# Patient Record
Sex: FEMALE | Race: BLACK OR AFRICAN AMERICAN | Employment: OTHER | URBAN - METROPOLITAN AREA
[De-identification: names, ages, dates, MRNs, and addresses within clinical notes are randomized per-mention and may not be internally consistent; named-entity substitution may affect disease eponyms.]

---

## 2017-01-20 ENCOUNTER — OFFICE VISIT (OUTPATIENT)
Dept: INTERNAL MEDICINE CLINIC | Facility: CLINIC | Age: 67
End: 2017-01-20

## 2017-01-20 VITALS
TEMPERATURE: 98 F | HEART RATE: 74 BPM | WEIGHT: 251 LBS | DIASTOLIC BLOOD PRESSURE: 65 MMHG | SYSTOLIC BLOOD PRESSURE: 101 MMHG | BODY MASS INDEX: 46.19 KG/M2 | HEIGHT: 62 IN

## 2017-01-20 DIAGNOSIS — M43.06 SPONDYLOLYSIS, LUMBAR REGION: ICD-10-CM

## 2017-01-20 DIAGNOSIS — I10 ESSENTIAL HYPERTENSION: ICD-10-CM

## 2017-01-20 DIAGNOSIS — E11.9 TYPE 2 DIABETES MELLITUS WITHOUT COMPLICATION, WITHOUT LONG-TERM CURRENT USE OF INSULIN (HCC): ICD-10-CM

## 2017-01-20 DIAGNOSIS — Z00.00 ENCOUNTER FOR ANNUAL HEALTH EXAMINATION: ICD-10-CM

## 2017-01-20 DIAGNOSIS — E78.5 DYSLIPIDEMIA: ICD-10-CM

## 2017-01-20 DIAGNOSIS — Z00.00 ENCOUNTER FOR MEDICARE ANNUAL WELLNESS EXAM: Primary | ICD-10-CM

## 2017-01-20 DIAGNOSIS — S32.009A CLOSED FRACTURE OF LUMBAR VERTEBRAL BODY (HCC): ICD-10-CM

## 2017-01-20 DIAGNOSIS — E66.01 OBESITY, CLASS III, BMI 40-49.9 (MORBID OBESITY) (HCC): ICD-10-CM

## 2017-01-20 DIAGNOSIS — Z12.11 SCREENING FOR COLON CANCER: ICD-10-CM

## 2017-01-20 PROCEDURE — 96160 PT-FOCUSED HLTH RISK ASSMT: CPT | Performed by: INTERNAL MEDICINE

## 2017-01-20 PROCEDURE — G0439 PPPS, SUBSEQ VISIT: HCPCS | Performed by: INTERNAL MEDICINE

## 2017-01-20 RX ORDER — ASPIRIN 81 MG/1
81 TABLET ORAL DAILY
Qty: 30 TABLET | Refills: 0 | Status: SHIPPED | OUTPATIENT
Start: 2017-01-20 | End: 2017-02-19

## 2017-01-20 RX ORDER — PRAVASTATIN SODIUM 20 MG
20 TABLET ORAL NIGHTLY
Qty: 90 TABLET | Refills: 3 | Status: SHIPPED | OUTPATIENT
Start: 2017-01-20 | End: 2018-01-15

## 2017-01-20 NOTE — ADDENDUM NOTE
Addended by: Allen Peer on: 1/20/2017 11:22 AM     Modules accepted: Orders, Level of Service, SmartSet

## 2017-01-20 NOTE — PROGRESS NOTES
HPI:   Migdalia Fernandez is a 73year old female who presents for a Medicare Subsequent Annual Wellness visit (Pt already had Initial Annual Wellness).     Her last annual assessment has been over 1 year: Annual Physical due on 02/20/1952         Patient Ca Coenzyme Q10 (COQ10) 200 MG Oral Cap Take 1 tablet by mouth daily. Turmeric Curcumin 500 MG Oral Cap Take 1 tablet by mouth daily. hydrocodone-acetaminophen 7.5-325 MG Oral Tab Take 1 tablet by mouth every 6 (six) hours as needed for Pain.       MEDIC Ear: Tympanic membrane and external ear normal. No drainage or tenderness. No mastoid tenderness. Tympanic membrane is not erythematous. Left Ear: Tympanic membrane and external ear normal. No drainage or tenderness.  No mastoid tenderness.    Nose: Nose adenopathy. Neurological: She is alert and oriented to person, place, and time. She has normal reflexes. No cranial nerve deficit. She exhibits normal muscle tone.  Coordination normal.    Reflex Scores:       Tricep reflexes are 2+ on the right side and hearing   loss: No             Visual Acuity                               SUGGESTED VACCINATIONS - Influenza, Pneumococcal, Zoster, Tetanus     There is no immunization history on file for this patient.     ASSESSMENT AND OTHER RELEVANT CHRONIC CONDITIONS you had any immunizations at another office such as Influenza, Hepatitis B, Tetanus, or Pneumococcal?: No     Functional Ability     Bathing or Showering: Able without help    Toileting: Able without help    Dressing: Able without help    Eating: Able with \"Tree\": Correct       This section provided for quick review of chart, separate sheet to patient  [address] Internal Lab or Procedure External Lab or Procedure   Diabetes Screening      HbgA1C   Annually GLYCOHEMOGLO or any previous visit. Hepatitis B No orders found for this or any previous visit. Tetanus No orders found for this or any previous visit.          [address] [facility] Internal Lab or Procedure External Lab or Procedure   Annual Monitoring o

## 2017-01-20 NOTE — PATIENT INSTRUCTIONS
Prevention Guidelines, Women Ages 72 and Older  Screening tests and vaccines are an important part of managing your health. Health counseling is essential, too. Below are guidelines for these, for women ages 72 and older.  Talk with your healthcare provid Lung cancer Adults age 54 to [de-identified] who have smoked Yearly screening in smokers with 30 pack-year history of smoking or who quit within 15 years   Obesity All women in this age group At routine exams   Osteoporosis All women in this age group Bone density test Counseling Who needs it How often   Diet and exercise Women who are overweight or obese When diagnosed, and then at routine exams   Fall prevention (exercise and vitamin D supplements) All women in this age group At routine exams   Sexually transmitted inf ---------- Medicare covers annually or at 6-month intervals for prediabetic patients        Cardiovascular Disease Screening     Cholesterol, covered every 5 yrs including Total, LDL and Trigs LDL CHOLESTEROL (mg/dL)   Date Value   12/23/2016 71     CHOLES Biennial benefit unless patient has history of long-term glucocorticoid use for medical condition    Last Dexa Scan:   XR DEXA BONE DENSITOMETRY (CPT=77080) 05/16/2016    No flowsheet data found.     Recommended for 2 year anniversary or as ordered in After Recommended Websites for Advanced Directives    [employer]ek[employer]no. org/publications/Documents/personal_dec. pdf  An information packet, including necessary form from the "[employer] website. http://www. [place]state. [place]/public/books/advin

## 2017-01-25 PROBLEM — E66.01 OBESITY, CLASS III, BMI 40-49.9 (MORBID OBESITY) (HCC): Status: ACTIVE | Noted: 2017-01-25

## 2017-01-25 PROBLEM — E78.5 DYSLIPIDEMIA: Status: ACTIVE | Noted: 2017-01-25

## 2017-01-25 PROBLEM — E66.813 OBESITY, CLASS III, BMI 40-49.9 (MORBID OBESITY): Status: ACTIVE | Noted: 2017-01-25

## 2017-01-29 PROBLEM — M25.561 PAIN IN BOTH KNEES: Status: ACTIVE | Noted: 2017-01-29

## 2017-01-29 PROBLEM — T23.052A: Status: ACTIVE | Noted: 2017-01-29

## 2017-01-29 PROBLEM — M25.562 PAIN IN BOTH KNEES: Status: ACTIVE | Noted: 2017-01-29

## 2017-02-23 PROBLEM — M17.0 PRIMARY OSTEOARTHRITIS OF KNEES, BILATERAL: Status: ACTIVE | Noted: 2017-02-23

## 2017-03-24 ENCOUNTER — TELEPHONE (OUTPATIENT)
Dept: INTERNAL MEDICINE CLINIC | Facility: CLINIC | Age: 67
End: 2017-03-24

## 2017-03-24 RX ORDER — CHLORTHALIDONE 25 MG/1
25 TABLET ORAL DAILY
Qty: 90 TABLET | Refills: 0 | Status: CANCELLED | OUTPATIENT
Start: 2017-03-24

## 2017-03-24 RX ORDER — CHLORTHALIDONE 25 MG/1
25 TABLET ORAL DAILY
Qty: 90 TABLET | Refills: 0 | Status: SHIPPED | OUTPATIENT
Start: 2017-03-24 | End: 2017-06-22

## 2017-03-24 NOTE — TELEPHONE ENCOUNTER
Current outpatient prescriptions:     •  chlorthalidone 25 MG Oral Tab, Take 1 tablet (25 mg total) by mouth daily. , Disp: 90 tablet, Rfl: 0 REFILL PATIENT WANTS 2 MTH REFILL

## 2017-03-24 NOTE — TELEPHONE ENCOUNTER
Reviewed patient chart, last office visit, and any current labs. Patient falls within the parameters of office protocol. Medication approved and sent to pharmacy, per office protocol.

## 2017-04-19 RX ORDER — PRAZOSIN HYDROCHLORIDE 1 MG/1
1 CAPSULE ORAL 2 TIMES DAILY
Qty: 90 CAPSULE | Refills: 0 | Status: SHIPPED | OUTPATIENT
Start: 2017-04-19 | End: 2017-04-21

## 2017-04-19 NOTE — TELEPHONE ENCOUNTER
Refill request     Current outpatient prescriptions:   •  Prazosin HCl 1 MG Oral Cap, Take 1 capsule (1 mg total) by mouth 2 (two) times daily. , Disp: 90 capsule, Rfl: 0

## 2017-04-21 ENCOUNTER — TELEPHONE (OUTPATIENT)
Dept: INTERNAL MEDICINE CLINIC | Facility: CLINIC | Age: 67
End: 2017-04-21

## 2017-04-21 RX ORDER — PRAZOSIN HYDROCHLORIDE 1 MG/1
1 CAPSULE ORAL 2 TIMES DAILY
Qty: 90 CAPSULE | Refills: 0 | Status: SHIPPED | OUTPATIENT
Start: 2017-04-21 | End: 2017-05-26

## 2017-04-21 NOTE — TELEPHONE ENCOUNTER
Called [place] and confirmed patient did not  prescription for Prazosin.   Re-ordered medication to [facility]/pharmacy, [place] per patient request.

## 2017-05-12 ENCOUNTER — TELEPHONE (OUTPATIENT)
Dept: INTERNAL MEDICINE CLINIC | Facility: CLINIC | Age: 67
End: 2017-05-12

## 2017-05-12 DIAGNOSIS — E11.9 TYPE 2 DIABETES MELLITUS WITHOUT COMPLICATION, WITHOUT LONG-TERM CURRENT USE OF INSULIN (HCC): Primary | ICD-10-CM

## 2017-05-26 ENCOUNTER — TELEPHONE (OUTPATIENT)
Dept: INTERNAL MEDICINE CLINIC | Facility: CLINIC | Age: 67
End: 2017-05-26

## 2017-05-26 NOTE — TELEPHONE ENCOUNTER
Current outpatient prescriptions:   •  Prazosin HCl 1 MG Oral Cap, Take 1 capsule (1 mg total) by mouth 2 (two) times daily. , Disp: 90 capsule, Rfl: 0  •

## 2017-05-30 RX ORDER — PRAZOSIN HYDROCHLORIDE 1 MG/1
1 CAPSULE ORAL 2 TIMES DAILY
Qty: 90 CAPSULE | Refills: 0 | Status: SHIPPED | OUTPATIENT
Start: 2017-05-30 | End: 2019-11-12

## 2017-05-31 ENCOUNTER — HOSPITAL ENCOUNTER (OUTPATIENT)
Dept: MAMMOGRAPHY | Age: 67
Discharge: HOME | End: 2017-05-31
Attending: OBSTETRICS & GYNECOLOGY
Payer: MEDICARE

## 2017-05-31 DIAGNOSIS — Z12.31 VISIT FOR SCREENING MAMMOGRAM: ICD-10-CM

## 2017-05-31 PROCEDURE — 77067 SCR MAMMO BI INCL CAD: CPT | Performed by: OBSTETRICS & GYNECOLOGY

## 2017-06-22 ENCOUNTER — OFFICE VISIT (OUTPATIENT)
Dept: INTERNAL MEDICINE CLINIC | Facility: CLINIC | Age: 67
End: 2017-06-22

## 2017-06-22 VITALS
HEIGHT: 62 IN | SYSTOLIC BLOOD PRESSURE: 98 MMHG | HEART RATE: 85 BPM | DIASTOLIC BLOOD PRESSURE: 66 MMHG | TEMPERATURE: 99 F | WEIGHT: 250.63 LBS | BODY MASS INDEX: 46.12 KG/M2

## 2017-06-22 DIAGNOSIS — E11.9 TYPE 2 DIABETES MELLITUS WITHOUT COMPLICATION, WITHOUT LONG-TERM CURRENT USE OF INSULIN (HCC): Primary | ICD-10-CM

## 2017-06-22 DIAGNOSIS — R42 LIGHTHEADEDNESS: ICD-10-CM

## 2017-06-22 PROCEDURE — 99214 OFFICE O/P EST MOD 30 MIN: CPT | Performed by: INTERNAL MEDICINE

## 2017-06-22 PROCEDURE — 82962 GLUCOSE BLOOD TEST: CPT | Performed by: INTERNAL MEDICINE

## 2017-06-22 PROCEDURE — 36416 COLLJ CAPILLARY BLOOD SPEC: CPT | Performed by: INTERNAL MEDICINE

## 2017-06-22 RX ORDER — CHLORTHALIDONE 25 MG/1
25 TABLET ORAL DAILY
Qty: 90 TABLET | Refills: 1 | Status: SHIPPED | OUTPATIENT
Start: 2017-06-22 | End: 2018-10-16

## 2017-06-22 RX ORDER — DILTIAZEM HYDROCHLORIDE 240 MG/1
240 CAPSULE, EXTENDED RELEASE ORAL DAILY
Qty: 90 CAPSULE | Refills: 0 | Status: SHIPPED | OUTPATIENT
Start: 2017-06-22 | End: 2017-09-17

## 2017-06-22 NOTE — PROGRESS NOTES
HPI:    Patient ID: Davion Kelley is a 78year old female.     HPI   She is here today complaining of lightheadedeness  She states that few days she was feeling lightheaded ,asociated with mild frontal headache but she denies any other symptoms , no blurr Outpatient Prescriptions:  chlorthalidone 25 MG Oral Tab Take 1 tablet (25 mg total) by mouth daily. Disp: 90 tablet Rfl: 1   DilTIAZem HCl  MG Oral Capsule SR 24 Hr Take 1 capsule (240 mg total) by mouth daily.  Disp: 90 capsule Rfl: 0   MELOXICAM 15 Social History:   Smoking Status: Former Smoker                   Packs/Day: 0.00  Years: 40      Alcohol Use: No                 PHYSICAL EXAM:    Physical Exam   Constitutional: She is oriented to person, place, and time.  She appears well-developed and exhibits no edema. Lymphadenopathy:     She has no cervical adenopathy. She has no axillary adenopathy. Neurological: She is alert and oriented to person, place, and time. She has normal reflexes. No cranial nerve deficit.  She exhibits normal muscl

## 2017-06-22 NOTE — PATIENT INSTRUCTIONS
Lightheadedness - could be due to blood pressure noted on lower side , will decrease quinipril to 20 mg , hold on chlorthalidone , advised to check blood pressure at home and call with blood pressure reading in 3-4 days , if your symptoms get worse , you f

## 2017-06-23 ENCOUNTER — TELEPHONE (OUTPATIENT)
Dept: INTERNAL MEDICINE CLINIC | Facility: CLINIC | Age: 67
End: 2017-06-23

## 2017-06-23 RX ORDER — QUINAPRIL 20 MG/1
20 TABLET ORAL NIGHTLY
Qty: 90 TABLET | Refills: 1 | Status: SHIPPED | OUTPATIENT
Start: 2017-06-23 | End: 2017-12-15

## 2017-06-23 NOTE — TELEPHONE ENCOUNTER
Per patient BP readings was taken before taking any BP medications. PCP aware, per PCP need to discontinue prazosin, continue taking diltiazem in am and quinapril in pm. To continue taking BP reading to monitor.  Spoke with patient, per patient she takes qi

## 2017-06-23 NOTE — TELEPHONE ENCOUNTER
Her blood pressurre is ok, she needed refill on her diltiaze/ and we send , also we told her to cut in half her quinipril iinsted of 40 to take 20 , and are this bp reading before taking meds?

## 2017-06-27 ENCOUNTER — TELEPHONE (OUTPATIENT)
Dept: INTERNAL MEDICINE CLINIC | Facility: CLINIC | Age: 67
End: 2017-06-27

## 2017-06-27 NOTE — TELEPHONE ENCOUNTER
Patient was told to call office with blood pressure readings,    6-23   9:10 pm  128/77 before medication  11:00 pm 125/72 after med    6-24  9:15 am 109/77 before med  10:45 am 128/69 after med  11:30 pm 154/69    6-25  9:00 am  121/60 before med  1:30 pm

## 2017-06-27 NOTE — TELEPHONE ENCOUNTER
Patient informed of Dr. Prabha Cardona instructions verbalized understanding. Denied any questions at the time of call.

## 2017-07-12 ENCOUNTER — TELEPHONE (OUTPATIENT)
Dept: INTERNAL MEDICINE CLINIC | Facility: CLINIC | Age: 67
End: 2017-07-12

## 2017-07-12 NOTE — TELEPHONE ENCOUNTER
Patient informed of MD instructions, verbalized understanding denied any questions at the time of call.

## 2017-07-12 NOTE — TELEPHONE ENCOUNTER
Current Outpatient Prescriptions:   •  Quinapril HCl 20 MG Oral Tab, Take 1 tablet (20 mg total) by mouth nightly., Disp: 90 tablet, Rfl: 1  •  ONETOUCH ULTRA BLUE In Vitro Strip, TEST 1 TIME DAILY, Disp: , Rfl: 0  •  chlorthalidone 25 MG Oral Tab, Take

## 2017-07-26 ENCOUNTER — TELEPHONE (OUTPATIENT)
Dept: INTERNAL MEDICINE CLINIC | Facility: CLINIC | Age: 67
End: 2017-07-26

## 2017-07-26 NOTE — TELEPHONE ENCOUNTER
7/12 3:30pm 133/66    7/14 10:30am 129/71    7/17 11:30am 130/68    7/19 10:45pm 135/70    7/21 9:00pm 139/80    7/22 10:30pm 141/66    7/23 10:00am 121/68   9:45 124/73    7/26 9:30am 138/75

## 2017-07-26 NOTE — TELEPHONE ENCOUNTER
Pt informed b/p readings are ok, pt informed to continue same meds and to continue checking b/p's and bring to next appt.

## 2017-07-31 PROBLEM — M65.331 TRIGGER MIDDLE FINGER OF RIGHT HAND: Status: ACTIVE | Noted: 2017-07-31

## 2017-07-31 PROBLEM — M19.041 OSTEOARTHRITIS OF FINGERS OF HANDS, BILATERAL: Status: ACTIVE | Noted: 2017-07-31

## 2017-07-31 PROBLEM — M19.042 OSTEOARTHRITIS OF FINGERS OF HANDS, BILATERAL: Status: ACTIVE | Noted: 2017-07-31

## 2017-09-18 RX ORDER — DILTIAZEM HYDROCHLORIDE 240 MG/1
240 CAPSULE, COATED, EXTENDED RELEASE ORAL DAILY
Qty: 90 CAPSULE | Refills: 0 | Status: SHIPPED | OUTPATIENT
Start: 2017-09-18 | End: 2019-11-12

## 2017-12-15 RX ORDER — QUINAPRIL 20 MG/1
20 TABLET ORAL NIGHTLY
Qty: 90 TABLET | Refills: 0 | Status: SHIPPED | OUTPATIENT
Start: 2017-12-15 | End: 2018-03-15

## 2017-12-15 NOTE — TELEPHONE ENCOUNTER
Hypertensive Medications  Protocol Criteria:  · Appointment scheduled in the past 6 months or in the next 3 months  · BMP or CMP in the past 12 months  · Creatinine result < 2  Recent Outpatient Visits            Yesterday Trigger middle finger of right ha

## 2017-12-28 ENCOUNTER — OFFICE VISIT (OUTPATIENT)
Dept: INTERNAL MEDICINE CLINIC | Facility: CLINIC | Age: 67
End: 2017-12-28

## 2017-12-28 VITALS
SYSTOLIC BLOOD PRESSURE: 134 MMHG | BODY MASS INDEX: 48.03 KG/M2 | TEMPERATURE: 99 F | HEIGHT: 62 IN | HEART RATE: 78 BPM | RESPIRATION RATE: 18 BRPM | DIASTOLIC BLOOD PRESSURE: 76 MMHG | WEIGHT: 261 LBS

## 2017-12-28 DIAGNOSIS — Z12.11 SCREENING FOR COLON CANCER: ICD-10-CM

## 2017-12-28 DIAGNOSIS — I10 ESSENTIAL HYPERTENSION: ICD-10-CM

## 2017-12-28 DIAGNOSIS — Z00.00 ROUTINE MEDICAL EXAM: Primary | ICD-10-CM

## 2017-12-28 PROCEDURE — G0463 HOSPITAL OUTPT CLINIC VISIT: HCPCS | Performed by: INTERNAL MEDICINE

## 2017-12-28 PROCEDURE — 99214 OFFICE O/P EST MOD 30 MIN: CPT | Performed by: INTERNAL MEDICINE

## 2017-12-28 RX ORDER — DILTIAZEM HYDROCHLORIDE 240 MG/1
CAPSULE, COATED, EXTENDED RELEASE ORAL
Qty: 90 CAPSULE | Refills: 0 | Status: SHIPPED | OUTPATIENT
Start: 2017-12-28 | End: 2018-03-24

## 2017-12-28 NOTE — PATIENT INSTRUCTIONS
Screening for colon cancer-referral for colonoscopy  Essential hypertension-advised for low-salt diet and aerobic exercise 4 times a week for 45 minutes check blood pressure at home and bring logbook next visit continue with current medication  Diabetes ty

## 2017-12-28 NOTE — PROGRESS NOTES
HPI:    Patient ID: Hoda Reddy is a 78year old female. HPI She came in today for follow-up on her blood pressure and refill her medication. She states that she checks her blood pressure at home and usually is around 130/80.   Taking her medication disturbance. The patient is not nervous/anxious. Current Outpatient Prescriptions:  DilTIAZem HCl ER Coated Beads 240 MG Oral Capsule SR 24 Hr TAKE 1 CAPSULE (240 MG TOTAL) BY MOUTH DAILY.  Disp: 90 capsule Rfl: 0   QUINAPRIL HCL 20 MG Oral Tab T leaking from brain   2009: HYSTERECTOMY  2010 and 2012: SHOULDER ARTHROSCOPY      Comment: RCR   Family History   Problem Relation Age of Onset   • Other [OTHER] Mother    • Cancer Self 63     uterine   • Cancer Brother 72     pancreatic      Social Histor has no wheezes. She has no rales. She exhibits no tenderness. Abdominal: Soft. Bowel sounds are normal. She exhibits no shifting dullness, no distension and no mass. There is no hepatosplenomegaly. There is no tenderness.  There is no rigidity, no rebound

## 2018-01-15 ENCOUNTER — TELEPHONE (OUTPATIENT)
Dept: INTERNAL MEDICINE CLINIC | Facility: CLINIC | Age: 68
End: 2018-01-15

## 2018-01-21 RX ORDER — PRAVASTATIN SODIUM 20 MG
TABLET ORAL
Qty: 90 TABLET | Refills: 0 | Status: SHIPPED | OUTPATIENT
Start: 2018-01-21 | End: 2018-06-26

## 2018-03-15 RX ORDER — QUINAPRIL 20 MG/1
20 TABLET ORAL NIGHTLY
Qty: 90 TABLET | Refills: 0 | Status: SHIPPED | OUTPATIENT
Start: 2018-03-15 | End: 2018-06-11

## 2018-03-25 RX ORDER — DILTIAZEM HYDROCHLORIDE 240 MG/1
CAPSULE, COATED, EXTENDED RELEASE ORAL
Qty: 90 CAPSULE | Refills: 0 | Status: SHIPPED | OUTPATIENT
Start: 2018-03-25 | End: 2018-07-02

## 2018-03-25 NOTE — TELEPHONE ENCOUNTER
Refilled per Epic protocol.     Hypertensive Medications  Protocol Criteria:  · Appointment scheduled in the past 6 months or in the next 3 months  · BMP or CMP in the past 12 months  · Creatinine result < 2  Recent Outpatient Visits            2 months ago

## 2018-04-05 ENCOUNTER — OFFICE VISIT (OUTPATIENT)
Dept: INTERNAL MEDICINE CLINIC | Facility: CLINIC | Age: 68
End: 2018-04-05

## 2018-04-05 VITALS
HEART RATE: 75 BPM | SYSTOLIC BLOOD PRESSURE: 135 MMHG | HEIGHT: 62 IN | TEMPERATURE: 99 F | WEIGHT: 265 LBS | DIASTOLIC BLOOD PRESSURE: 80 MMHG | BODY MASS INDEX: 48.76 KG/M2 | RESPIRATION RATE: 18 BRPM

## 2018-04-05 DIAGNOSIS — Z00.00 ENCOUNTER FOR ANNUAL HEALTH EXAMINATION: ICD-10-CM

## 2018-04-05 DIAGNOSIS — M17.0 PRIMARY OSTEOARTHRITIS OF KNEES, BILATERAL: ICD-10-CM

## 2018-04-05 DIAGNOSIS — E11.8 TYPE 2 DIABETES MELLITUS WITH COMPLICATION, WITHOUT LONG-TERM CURRENT USE OF INSULIN (HCC): ICD-10-CM

## 2018-04-05 DIAGNOSIS — E78.5 DYSLIPIDEMIA: ICD-10-CM

## 2018-04-05 DIAGNOSIS — Z78.0 MENOPAUSE: ICD-10-CM

## 2018-04-05 DIAGNOSIS — Z23 NEED FOR VACCINATION: ICD-10-CM

## 2018-04-05 DIAGNOSIS — E66.01 OBESITY, CLASS III, BMI 40-49.9 (MORBID OBESITY) (HCC): ICD-10-CM

## 2018-04-05 DIAGNOSIS — Z12.39 SCREENING FOR BREAST CANCER: ICD-10-CM

## 2018-04-05 DIAGNOSIS — M43.06 SPONDYLOLYSIS, LUMBAR REGION: ICD-10-CM

## 2018-04-05 DIAGNOSIS — Z00.00 MEDICARE ANNUAL WELLNESS VISIT, SUBSEQUENT: Primary | ICD-10-CM

## 2018-04-05 DIAGNOSIS — D72.819 LEUKOPENIA, UNSPECIFIED TYPE: ICD-10-CM

## 2018-04-05 DIAGNOSIS — M65.331 TRIGGER MIDDLE FINGER OF RIGHT HAND: ICD-10-CM

## 2018-04-05 DIAGNOSIS — I10 ESSENTIAL HYPERTENSION: ICD-10-CM

## 2018-04-05 PROBLEM — Z85.42 HISTORY OF ENDOMETRIAL CANCER: Status: ACTIVE | Noted: 2018-04-05

## 2018-04-05 PROBLEM — Z12.11 SCREENING FOR COLON CANCER: Status: RESOLVED | Noted: 2017-12-28 | Resolved: 2018-04-05

## 2018-04-05 PROBLEM — R42 LIGHTHEADEDNESS: Status: RESOLVED | Noted: 2017-06-22 | Resolved: 2018-04-05

## 2018-04-05 PROBLEM — T23.052A: Status: RESOLVED | Noted: 2017-01-29 | Resolved: 2018-04-05

## 2018-04-05 PROCEDURE — G0439 PPPS, SUBSEQ VISIT: HCPCS | Performed by: INTERNAL MEDICINE

## 2018-04-05 PROCEDURE — 96160 PT-FOCUSED HLTH RISK ASSMT: CPT | Performed by: INTERNAL MEDICINE

## 2018-04-05 PROCEDURE — 99397 PER PM REEVAL EST PAT 65+ YR: CPT | Performed by: INTERNAL MEDICINE

## 2018-04-05 NOTE — PROGRESS NOTES
HPI:   Leah Hollis is a 80year old female who presents for a Medicare Subsequent Annual Wellness visit (Pt already had Initial Annual Wellness).     Her last annual assessment has been over 1 year: Annual Physical due on 01/20/2018         Fall/Risk As (hypertension)     DM type 2 (diabetes mellitus, type 2) (HCC)     Obesity, Class III, BMI 40-49.9 (morbid obesity) ([facility] [address]     Dyslipidemia     Pain in both knees     Primary osteoarthritis of knees, bilateral     Trigger middle finger of right hand     Aretta Medicine Cap Take 1 tablet by mouth daily. MEDICAL INFORMATION:   She  has a past medical history of Arthritis; Cancer [facility]) (2009); Cancer determined [facility] ([facility]); Diabetes type 2, controlled ([facility] and Hypertension.     She  has a pas same time:  No   I have trouble understanding things on the TV:  No I have to strain to understand conversations:  No   I have to worry about missing the telephone ring or doorbell:  No I have trouble hearing conversations in a noisy background such as a c 2+ on the left side. Pulmonary/Chest: Effort normal and breath sounds normal. No respiratory distress. She has no wheezes. She has no rales. She exhibits no tenderness. Abdominal: Soft. Bowel sounds are normal. She exhibits no distension and no mass. Screening for breast cancer  -     [facility] SCREENING BILAT (CPT=77067); Future    Medicare annual wellness visit, subsequent  -     CBC WITH DIFFERENTIAL WITH PLATELET;  Future  -     VITAMIN B12; Future  -     VITAMIN D, 25-HYDROXY; Future  -     HEMOGLOBI Electrocardiogram date     Colorectal Cancer Screening      Colonoscopy Screen every 10 years Colonoscopy,10 Years due on 02/20/2001 Has referal     Flex Sigmoidoscopy Screen every 10 years No results found for this or any previous visit.  No flowsheet data Zoster   Not covered by Medicare Part B Information given This may be covered with your pharmacy  prescription benefits      [address] [facility] Internal Lab or Procedure External Lab or Procedure      Annual Monitoring of Persistent     Medication

## 2018-04-05 NOTE — PATIENT INSTRUCTIONS
Prevention Guidelines, Women Ages 72 and Older  Screening tests and vaccines are an important part of managing your health. Health counseling is essential, too. Below are guidelines for these, for women ages 72 and older.  Talk with your healthcare provid Lung cancer Adults age 54 to [de-identified] who have smoked Yearly screening in smokers with 30 pack-year history of smoking or who quit within 15 years   Obesity All women in this age group At routine exams   Osteoporosis All women in this age group Bone density test Counseling Who needs it How often   Diet and exercise Women who are overweight or obese When diagnosed, and then at routine exams   Fall prevention (exercise and vitamin D supplements) All women in this age group At routine exams   Sexually transmitted inf ---------- Medicare covers annually or at 6-month intervals for prediabetic patients        Cardiovascular Disease Screening     Cholesterol, covered every 5 yrs including Total, LDL and Trigs LDL Cholesterol (mg/dL)   Date Value   12/28/2017 77     Choles Biennial benefit unless patient has history of long-term glucocorticoid use for medical condition    Last Dexa Scan:   XR DEXA BONE DENSITOMETRY (CPT=77080) 05/16/2016    No flowsheet data found.     Recommended for 2 year anniversary or as ordered in After SeekAlumni.no. org/publications/Documents/personal_dec. pdf  An information packet, including necessary form from the [address] website. http://www. [place]. [place]/public/books/advin.htm  A link to the Sam Sahu

## 2018-06-11 RX ORDER — QUINAPRIL 20 MG/1
20 TABLET ORAL NIGHTLY
Qty: 90 TABLET | Refills: 0 | Status: SHIPPED | OUTPATIENT
Start: 2018-06-11 | End: 2018-09-08

## 2018-06-26 RX ORDER — PRAVASTATIN SODIUM 20 MG
TABLET ORAL
Qty: 90 TABLET | Refills: 0 | Status: SHIPPED | OUTPATIENT
Start: 2018-06-26 | End: 2018-09-22

## 2018-07-02 RX ORDER — DILTIAZEM HYDROCHLORIDE 240 MG/1
CAPSULE, COATED, EXTENDED RELEASE ORAL
Qty: 90 CAPSULE | Refills: 0 | Status: SHIPPED | OUTPATIENT
Start: 2018-07-02 | End: 2018-09-28

## 2018-07-02 NOTE — TELEPHONE ENCOUNTER
Refill passed per [facility] protocol.   Diabetes Medications  Protocol Criteria:  · Appointment scheduled in the past 6 months or the next 3 months  · A1C < 7.5 in the past 6 months  · Creatinine in the past 12 months  · Creatinine result < 1.5   Rece

## 2018-07-17 ENCOUNTER — HOSPITAL ENCOUNTER (OUTPATIENT)
Dept: BONE DENSITY | Facility: HOSPITAL | Age: 68
Discharge: HOME | End: 2018-07-17
Attending: INTERNAL MEDICINE
Payer: MEDICARE

## 2018-07-17 ENCOUNTER — HOSPITAL ENCOUNTER (OUTPATIENT)
Dept: MAMMOGRAPHY | Facility: HOSPITAL | Age: 68
Discharge: HOME | End: 2018-07-17
Attending: INTERNAL MEDICINE
Payer: MEDICARE

## 2018-07-17 DIAGNOSIS — Z78.0 MENOPAUSE: ICD-10-CM

## 2018-07-17 DIAGNOSIS — Z12.39 SCREENING FOR BREAST CANCER: ICD-10-CM

## 2018-07-17 PROCEDURE — 77080 DXA BONE DENSITY AXIAL: CPT | Performed by: INTERNAL MEDICINE

## 2018-07-17 PROCEDURE — 77067 SCR MAMMO BI INCL CAD: CPT | Performed by: INTERNAL MEDICINE

## 2018-07-19 ENCOUNTER — TELEPHONE (OUTPATIENT)
Dept: INTERNAL MEDICINE CLINIC | Facility: CLINIC | Age: 68
End: 2018-07-19

## 2018-08-06 ENCOUNTER — PATIENT OUTREACH (OUTPATIENT)
Dept: CASE MANAGEMENT | Age: 68
End: 2018-08-06

## 2018-08-06 NOTE — PROGRESS NOTES
Patient returned call, informed is due for GI consult for colonoscopy and referral is in the system and offered assistance in scheduling. Patient states will call back to schedule.

## 2018-08-06 NOTE — PROGRESS NOTES
Patient is due for colorectal screening. Referral written 12/28/17. Left message to call back [phone].

## 2018-09-10 RX ORDER — QUINAPRIL 20 MG/1
TABLET ORAL
Qty: 90 TABLET | Refills: 0 | Status: SHIPPED | OUTPATIENT
Start: 2018-09-10 | End: 2018-12-12

## 2018-09-11 PROBLEM — M17.12 PRIMARY OSTEOARTHRITIS OF LEFT KNEE: Status: ACTIVE | Noted: 2018-09-11

## 2018-09-24 RX ORDER — PRAVASTATIN SODIUM 20 MG
TABLET ORAL
Qty: 90 TABLET | Refills: 0 | Status: SHIPPED | OUTPATIENT
Start: 2018-09-24 | End: 2019-01-07

## 2018-09-28 RX ORDER — DILTIAZEM HYDROCHLORIDE 240 MG/1
CAPSULE, COATED, EXTENDED RELEASE ORAL
Qty: 90 CAPSULE | Refills: 0 | Status: SHIPPED | OUTPATIENT
Start: 2018-09-28 | End: 2018-12-26

## 2018-10-05 ENCOUNTER — TELEPHONE (OUTPATIENT)
Dept: INTERNAL MEDICINE CLINIC | Facility: CLINIC | Age: 68
End: 2018-10-05

## 2018-10-05 RX ORDER — ALENDRONATE SODIUM 70 MG/1
70 TABLET ORAL WEEKLY
Qty: 12 TABLET | Refills: 0 | Status: SHIPPED | OUTPATIENT
Start: 2018-10-05 | End: 2018-12-26

## 2018-10-05 NOTE — TELEPHONE ENCOUNTER
Current Outpatient Medications:     •  alendronate 70 MG Oral Tab, Take 1 tablet (70 mg total) by mouth once a week., Disp: 12 tablet, Rfl: 0    Refill

## 2018-10-15 RX ORDER — ALENDRONATE SODIUM 70 MG/1
TABLET ORAL
Qty: 12 TABLET | Refills: 0 | OUTPATIENT
Start: 2018-10-15

## 2018-10-16 ENCOUNTER — OFFICE VISIT (OUTPATIENT)
Dept: INTERNAL MEDICINE CLINIC | Facility: CLINIC | Age: 68
End: 2018-10-16
Payer: COMMERCIAL

## 2018-10-16 VITALS
WEIGHT: 265 LBS | HEART RATE: 78 BPM | RESPIRATION RATE: 18 BRPM | SYSTOLIC BLOOD PRESSURE: 135 MMHG | TEMPERATURE: 99 F | HEIGHT: 62 IN | DIASTOLIC BLOOD PRESSURE: 80 MMHG | BODY MASS INDEX: 48.76 KG/M2

## 2018-10-16 DIAGNOSIS — Z12.11 SCREENING FOR COLON CANCER: Primary | ICD-10-CM

## 2018-10-16 DIAGNOSIS — I10 ESSENTIAL HYPERTENSION: ICD-10-CM

## 2018-10-16 PROCEDURE — 99213 OFFICE O/P EST LOW 20 MIN: CPT | Performed by: INTERNAL MEDICINE

## 2018-10-16 RX ORDER — CHLORTHALIDONE 25 MG/1
25 TABLET ORAL DAILY
Qty: 90 TABLET | Refills: 1 | Status: SHIPPED | OUTPATIENT
Start: 2018-10-16 | End: 2019-04-03

## 2018-10-16 NOTE — PATIENT INSTRUCTIONS
Screening for colon cancer  (primary encounter diagnosis) referral for GI  Essential hypertension-well-controlled advised her to continue with current medication check blood pressure at home or any local pharmacy and bring the logbook on your next visit

## 2018-10-16 NOTE — PROGRESS NOTES
HPI:    Patient ID: David Donovan is a 80year old female. HPI  She is here for follow up on her htn    She states that she is not checking her bp for some time because she was so stressed out , her dad passed away one month ago . Marta Ott   She states that th Rfl: 1   alendronate 70 MG Oral Tab Take 1 tablet (70 mg total) by mouth once a week.  Disp: 12 tablet Rfl: 0   DILTIAZEM HCL ER COATED BEADS 240 MG Oral Capsule SR 24 Hr TAKE 1 CAPSULE BY MOUTH EVERY DAY Disp: 90 capsule Rfl: 0   METFORMIN  MG Oral SHOULDER ARTHROSCOPY  2010 and 2012    RCR      Family History   Problem Relation Age of Onset   • Other (Other) Mother    • Cancer Self 63        uterine   • Cancer Brother 72        pancreatic      Social History: Social History    Tobacco Use      Smoki are normal. She exhibits no shifting dullness, no distension and no mass. There is no hepatosplenomegaly. There is no tenderness. There is no rigidity, no rebound, no guarding and no CVA tenderness. Musculoskeletal: Normal range of motion.  She exhibits n

## 2018-11-27 PROBLEM — M79.641 RIGHT HAND PAIN: Status: ACTIVE | Noted: 2018-11-27

## 2018-11-27 PROBLEM — M18.11 ARTHRITIS OF CARPOMETACARPAL (CMC) JOINT OF RIGHT THUMB: Status: ACTIVE | Noted: 2018-11-27

## 2018-11-27 PROBLEM — M77.8 TENDONITIS OF RIGHT HAND: Status: ACTIVE | Noted: 2018-11-27

## 2018-12-12 RX ORDER — QUINAPRIL 20 MG/1
TABLET ORAL
Qty: 90 TABLET | Refills: 0 | Status: SHIPPED | OUTPATIENT
Start: 2018-12-12 | End: 2019-03-10

## 2018-12-26 RX ORDER — ALENDRONATE SODIUM 70 MG/1
70 TABLET ORAL WEEKLY
Qty: 12 TABLET | Refills: 1 | Status: SHIPPED | OUTPATIENT
Start: 2018-12-26 | End: 2019-06-14

## 2018-12-26 RX ORDER — DILTIAZEM HYDROCHLORIDE 240 MG/1
CAPSULE, COATED, EXTENDED RELEASE ORAL
Qty: 90 CAPSULE | Refills: 0 | Status: SHIPPED | OUTPATIENT
Start: 2018-12-26 | End: 2019-03-27

## 2019-01-07 RX ORDER — PRAVASTATIN SODIUM 20 MG
TABLET ORAL
Qty: 90 TABLET | Refills: 0 | Status: SHIPPED | OUTPATIENT
Start: 2019-01-07 | End: 2019-03-31

## 2019-01-07 NOTE — TELEPHONE ENCOUNTER
Current Outpatient Medications:     •  PRAVASTATIN SODIUM 20 MG Oral Tab, TAKE ONE TABLET BY MOUTH DAILY IN THE P.M., Disp: 90 tablet, Rfl: 0

## 2019-01-31 ENCOUNTER — TELEPHONE (OUTPATIENT)
Dept: CASE MANAGEMENT | Age: 69
End: 2019-01-31

## 2019-01-31 NOTE — TELEPHONE ENCOUNTER
Patient is eligible for a 2019 Annual Medicare Health Assessment. Discussed in detail w/patient. Appt scheduled for 2/19/19.

## 2019-02-05 NOTE — H&P
[address] [facility] [address] Gastroenterology                                                                                                  Clinic History and Physical     Pa fluid leaking from brain    • COLONOSCOPY     • HYSTERECTOMY  2009   • SHOULDER ARTHROSCOPY  2010 and 2012    RCR      Family Hx:   Family History   Problem Relation Age of Onset   • Other (Other) Mother    • Cancer Self 63        uterine   • Cancer Bro hydrocodone-acetaminophen 7.5-325 MG Oral Tab Take 1 tablet by mouth every 6 (six) hours as needed for Pain. Disp:  Rfl:    Prazosin HCl 1 MG Oral Cap Take 1 capsule (1 mg total) by mouth 2 (two) times daily.  Disp: 90 capsule Rfl: 0   silver sulfADIAZINE A&P for further details.       ASSESSMENT/PLAN:   Rosa Fields is a 80year old year-old female pt of Dr. Elaina Vásquez with history of diabetes type 2, hypertension, back problems, OA, dyslipidemia, uterine cancer, who presents for colon cancer screening evalua answered to the patient’s satisfaction. The patient signed informed consent and elected to proceed with colonoscopy with intervention [i.e. polypectomy, stent placement, etc.] as indicated.       Orders This Visit:  No orders of the defined types were place

## 2019-02-13 ENCOUNTER — TELEPHONE (OUTPATIENT)
Dept: GASTROENTEROLOGY | Facility: CLINIC | Age: 69
End: 2019-02-13

## 2019-02-13 ENCOUNTER — OFFICE VISIT (OUTPATIENT)
Dept: GASTROENTEROLOGY | Facility: CLINIC | Age: 69
End: 2019-02-13
Payer: COMMERCIAL

## 2019-02-13 VITALS
HEIGHT: 62 IN | BODY MASS INDEX: 47.66 KG/M2 | SYSTOLIC BLOOD PRESSURE: 126 MMHG | WEIGHT: 259 LBS | DIASTOLIC BLOOD PRESSURE: 71 MMHG | HEART RATE: 92 BPM

## 2019-02-13 DIAGNOSIS — Z12.11 SCREENING FOR COLON CANCER: Primary | ICD-10-CM

## 2019-02-13 DIAGNOSIS — Z12.11 SCREEN FOR COLON CANCER: Primary | ICD-10-CM

## 2019-02-13 NOTE — TELEPHONE ENCOUNTER
GI RN's    Pt is scheduled for colonoscopy. She has [facility] [employer] and is scheduled at [facility] due to BMI. Can you double check for PA? Thank you.

## 2019-02-13 NOTE — PATIENT INSTRUCTIONS
-Schedule colonoscopy w/ Dr. Abi Kelley or Dr. Keron South with MAC   -Prep: Split dose Colyte or equivalent  -Anti-platelets and anti-coagulants: None  -Diabetes meds: Hold metformin day before/day of procedure    ** If MAC @ [facility]/[place]:    - HOLD  Quinapril the

## 2019-02-13 NOTE — TELEPHONE ENCOUNTER
Scheduled for:  Colonoscopy 15132   Provider Name: Dr. Dias Other  Date:  5/9/19  Location:  [facility]  Sedation:  MAC  Time:  9:45 am, arrival 8:45 am  Prep: Colyte  Meds/Allergies Reconciled?:  Leslie/APN reviewed.   Diagnosis with codes:  Screening Z12.11  Was patient

## 2019-02-14 NOTE — TELEPHONE ENCOUNTER
Spoke to [address] [place] [address] ([phone]) at [facility] and states based on pt plan, CLN [id number] does NOT require a PA regardless of location ([facility] vs [facility] does not matter). Call reference number: Ilsa Grady. 2/14/19 @ [phone].

## 2019-02-15 ENCOUNTER — MA CHART PREP (OUTPATIENT)
Dept: FAMILY MEDICINE CLINIC | Facility: CLINIC | Age: 69
End: 2019-02-15

## 2019-02-19 ENCOUNTER — OFFICE VISIT (OUTPATIENT)
Dept: INTERNAL MEDICINE CLINIC | Facility: CLINIC | Age: 69
End: 2019-02-19
Payer: COMMERCIAL

## 2019-02-19 VITALS
HEIGHT: 62 IN | SYSTOLIC BLOOD PRESSURE: 126 MMHG | BODY MASS INDEX: 47.84 KG/M2 | HEART RATE: 78 BPM | DIASTOLIC BLOOD PRESSURE: 75 MMHG | TEMPERATURE: 99 F | WEIGHT: 260 LBS | RESPIRATION RATE: 18 BRPM

## 2019-02-19 DIAGNOSIS — Z00.00 ENCOUNTER FOR ANNUAL HEALTH EXAMINATION: ICD-10-CM

## 2019-02-19 DIAGNOSIS — E66.01 OBESITY, CLASS III, BMI 40-49.9 (MORBID OBESITY) (HCC): ICD-10-CM

## 2019-02-19 DIAGNOSIS — Z00.00 ENCOUNTER FOR MEDICARE ANNUAL WELLNESS EXAM: Primary | ICD-10-CM

## 2019-02-19 DIAGNOSIS — Z12.39 SCREENING FOR BREAST CANCER: ICD-10-CM

## 2019-02-19 DIAGNOSIS — E11.8 TYPE 2 DIABETES MELLITUS WITH COMPLICATION, WITHOUT LONG-TERM CURRENT USE OF INSULIN (HCC): ICD-10-CM

## 2019-02-19 PROBLEM — M81.0 OSTEOPOROSIS: Status: ACTIVE | Noted: 2019-02-19

## 2019-02-19 PROBLEM — D72.819 LEUCOPENIA: Status: RESOLVED | Noted: 2018-04-05 | Resolved: 2019-02-19

## 2019-02-19 PROBLEM — M77.8 TENDONITIS OF RIGHT HAND: Status: RESOLVED | Noted: 2018-11-27 | Resolved: 2019-02-19

## 2019-02-19 PROCEDURE — 96160 PT-FOCUSED HLTH RISK ASSMT: CPT | Performed by: INTERNAL MEDICINE

## 2019-02-19 PROCEDURE — G0439 PPPS, SUBSEQ VISIT: HCPCS | Performed by: INTERNAL MEDICINE

## 2019-02-19 PROCEDURE — 99397 PER PM REEVAL EST PAT 65+ YR: CPT | Performed by: INTERNAL MEDICINE

## 2019-02-19 NOTE — PROGRESS NOTES
HPI:   Olga Schuster is a 80year old female who presents for a Medicare Subsequent Annual Wellness visit (Pt already had Initial Annual Wellness).     Annual Physical due on 04/05/2019        Fall/Risk Assessment   She has been screened for Falls and i standard forms performed Face to Face with patient and Family/surrogate (if present), and forms available to patient in AVS       She does have a POA but we do NOT have it on file in [facility].    Advance care planning including the explanation and discussion of CREATSERUM 0.74 12/28/2017    GLU 98 12/28/2017        CBC  (most recent labs)   Lab Results   Component Value Date    WBC 4.3 04/05/2018    HGB 11.9 (L) 04/05/2018     04/05/2018        ALLERGIES:   She has No Known Allergies.     CURRENT MEDICATION Diabetes type 2, controlled ([facility] [address] and Hypertension. She  has a past surgical history that includes hysterectomy (2009); shoulder arthroscopy (2010 and 2012); Brain Surgery (2012); and colonoscopy.     Her family history includes Cancer (age of onset: 63 telephone ring or doorbell:  No I have trouble hearing conversations in a noisy background such as a crowded room or restaurant:  No   I get confused about where sounds come from:  No I misunderstand some words in a sentence and need to ask people to repea tibial pulses are 2+ on the right side, and 2+ on the left side. Pulmonary/Chest: Effort normal and breath sounds normal. No respiratory distress. She has no wheezes. She has no rales. Abdominal: Soft.  Bowel sounds are normal. She exhibits no distensio hypertension controlled , advised to continue with low salt diet, continue with current meds, check bp at home and bring log book next visit     Dyslipidemia advised to watch her diet , avoid fatty food, red meat, more fresh fruits and vegetables , continu (mg/dL)   Date Value   12/28/2017 98          Cardiovascular Disease Screening     LDL Annually LDL Cholesterol (mg/dL)   Date Value   12/28/2017 76        EKG - w/ Initial Preventative Physical Exam only, or if medically necessary Electrocardiogram date injectable drug abusers     Tetanus Toxoid  Only covered with a cut with metal- TD and TDaP Not covered by Medicare Part B) No vaccine history found This may be covered with your prescription benefits, but Medicare does not cover unless Medically needed

## 2019-02-19 NOTE — PATIENT INSTRUCTIONS
Guy Israel's SCREENING SCHEDULE   Tests on this list are recommended by your physician but may not be covered, or covered at this frequency, by your insurer. Please check with your insurance carrier before scheduling to verify coverage.    PREVENTATI every 10 years- more often if abnormal Colonoscopy due on 02/20/1950 Update [facility] if applicable    Flex Sigmoidoscopy Screen  Covered every 5 years No results found for this or any previous visit. No flowsheet data found.      Fecal Occult Bloo orders found for this or any previous visit. Please get once after your 65th birthday    Hepatitis B for Moderate/High Risk       No orders found for this or any previous visit.  Medium/high risk factors:   End-stage renal disease   Hemophiliacs who receive

## 2019-03-04 ENCOUNTER — LAB ENCOUNTER (OUTPATIENT)
Dept: LAB | Facility: HOSPITAL | Age: 69
End: 2019-03-04
Attending: INTERNAL MEDICINE
Payer: MEDICARE

## 2019-03-04 DIAGNOSIS — Z00.00 ENCOUNTER FOR MEDICARE ANNUAL WELLNESS EXAM: ICD-10-CM

## 2019-03-04 DIAGNOSIS — D50.8 OTHER IRON DEFICIENCY ANEMIA: ICD-10-CM

## 2019-03-04 LAB
25(OH)D3 SERPL-MCNC: 54.3 NG/ML (ref 30–100)
ALBUMIN SERPL-MCNC: 3.8 G/DL (ref 3.4–5)
ALBUMIN/GLOB SERPL: 0.9 {RATIO} (ref 1–2)
ALP LIVER SERPL-CCNC: 75 U/L (ref 55–142)
ALT SERPL-CCNC: 32 U/L (ref 13–56)
ANION GAP SERPL CALC-SCNC: 6 MMOL/L (ref 0–18)
AST SERPL-CCNC: 21 U/L (ref 15–37)
BASOPHILS # BLD AUTO: 0.02 X10(3) UL (ref 0–0.2)
BASOPHILS NFR BLD AUTO: 0.4 %
BILIRUB SERPL-MCNC: 0.4 MG/DL (ref 0.1–2)
BUN BLD-MCNC: 16 MG/DL (ref 7–18)
BUN/CREAT SERPL: 18.2 (ref 10–20)
CALCIUM BLD-MCNC: 9.5 MG/DL (ref 8.5–10.1)
CHLORIDE SERPL-SCNC: 105 MMOL/L (ref 98–107)
CHOLEST SMN-MCNC: 147 MG/DL (ref ?–200)
CO2 SERPL-SCNC: 28 MMOL/L (ref 21–32)
CREAT BLD-MCNC: 0.88 MG/DL (ref 0.55–1.02)
CREAT UR-SCNC: 154 MG/DL
DEPRECATED RDW RBC AUTO: 47.4 FL (ref 35.1–46.3)
EOSINOPHIL # BLD AUTO: 0.07 X10(3) UL (ref 0–0.7)
EOSINOPHIL NFR BLD AUTO: 1.3 %
ERYTHROCYTE [DISTWIDTH] IN BLOOD BY AUTOMATED COUNT: 15.9 % (ref 11–15)
GLOBULIN PLAS-MCNC: 4.4 G/DL (ref 2.8–4.4)
GLUCOSE BLD-MCNC: 136 MG/DL (ref 70–99)
HCT VFR BLD AUTO: 44.7 % (ref 35–48)
HDLC SERPL-MCNC: 55 MG/DL (ref 40–59)
HGB BLD-MCNC: 13.7 G/DL (ref 12–16)
IMM GRANULOCYTES # BLD AUTO: 0.02 X10(3) UL (ref 0–1)
IMM GRANULOCYTES NFR BLD: 0.4 %
IRON SATURATION: 14 % (ref 15–50)
IRON SERPL-MCNC: 67 UG/DL (ref 50–170)
LDLC SERPL CALC-MCNC: 77 MG/DL (ref ?–100)
LYMPHOCYTES # BLD AUTO: 1.97 X10(3) UL (ref 1–4)
LYMPHOCYTES NFR BLD AUTO: 35.8 %
M PROTEIN MFR SERPL ELPH: 8.2 G/DL (ref 6.4–8.2)
MCH RBC QN AUTO: 25.6 PG (ref 26–34)
MCHC RBC AUTO-ENTMCNC: 30.6 G/DL (ref 31–37)
MCV RBC AUTO: 83.4 FL (ref 80–100)
MICROALBUMIN UR-MCNC: <0.5 MG/DL
MONOCYTES # BLD AUTO: 0.48 X10(3) UL (ref 0.1–1)
MONOCYTES NFR BLD AUTO: 8.7 %
NEUTROPHILS # BLD AUTO: 2.94 X10 (3) UL (ref 1.5–7.7)
NEUTROPHILS # BLD AUTO: 2.94 X10(3) UL (ref 1.5–7.7)
NEUTROPHILS NFR BLD AUTO: 53.4 %
NONHDLC SERPL-MCNC: 92 MG/DL (ref ?–130)
OSMOLALITY SERPL CALC.SUM OF ELEC: 291 MOSM/KG (ref 275–295)
PLATELET # BLD AUTO: 383 10(3)UL (ref 150–450)
POTASSIUM SERPL-SCNC: 3.9 MMOL/L (ref 3.5–5.1)
RBC # BLD AUTO: 5.36 X10(6)UL (ref 3.8–5.3)
SODIUM SERPL-SCNC: 139 MMOL/L (ref 136–145)
TOTAL IRON BINDING CAPACITY: 480 UG/DL (ref 240–450)
TRANSFERRIN SERPL-MCNC: 322 MG/DL (ref 200–360)
TRIGL SERPL-MCNC: 76 MG/DL (ref 30–149)
VLDLC SERPL CALC-MCNC: 15 MG/DL (ref 0–30)
WBC # BLD AUTO: 5.5 X10(3) UL (ref 4–11)

## 2019-03-04 PROCEDURE — 82306 VITAMIN D 25 HYDROXY: CPT

## 2019-03-04 PROCEDURE — 82570 ASSAY OF URINE CREATININE: CPT

## 2019-03-04 PROCEDURE — 84466 ASSAY OF TRANSFERRIN: CPT

## 2019-03-04 PROCEDURE — 80053 COMPREHEN METABOLIC PANEL: CPT

## 2019-03-04 PROCEDURE — 36415 COLL VENOUS BLD VENIPUNCTURE: CPT

## 2019-03-04 PROCEDURE — 83540 ASSAY OF IRON: CPT

## 2019-03-04 PROCEDURE — 80061 LIPID PANEL: CPT

## 2019-03-04 PROCEDURE — 85025 COMPLETE CBC W/AUTO DIFF WBC: CPT

## 2019-03-04 PROCEDURE — 82043 UR ALBUMIN QUANTITATIVE: CPT

## 2019-03-11 RX ORDER — QUINAPRIL 20 MG/1
TABLET ORAL
Qty: 90 TABLET | Refills: 0 | Status: SHIPPED | OUTPATIENT
Start: 2019-03-11 | End: 2019-06-10

## 2019-03-25 ENCOUNTER — TELEPHONE (OUTPATIENT)
Dept: GASTROENTEROLOGY | Facility: CLINIC | Age: 69
End: 2019-03-25

## 2019-03-25 DIAGNOSIS — Z12.11 COLON CANCER SCREENING: Primary | ICD-10-CM

## 2019-03-26 ENCOUNTER — TELEPHONE (OUTPATIENT)
Dept: GASTROENTEROLOGY | Facility: CLINIC | Age: 69
End: 2019-03-26

## 2019-03-26 NOTE — TELEPHONE ENCOUNTER
I contacted Robbie Nassar at [facility] [phone], gave clinicals, CPT--states with this pt's [facility] Medicare Advantage PPO no prior auth is needed, just needs to meet Medicare guidelines.

## 2019-03-26 NOTE — TELEPHONE ENCOUNTER
Pt notified no prior auth needed, but if she needs to know about deductible or co-pay she would need to contact benefits. States her last colonoscopy was over 10 yrs ago, so this would meet medicare guidelines.

## 2019-03-26 NOTE — TELEPHONE ENCOUNTER
GI/RN--    This patient reschedule her procedure, see below    Please contact Sherrie again for a new PA since her BMI is to high for [facility], referral entered    You may close the TE when done

## 2019-03-26 NOTE — TELEPHONE ENCOUNTER
Rescheduled for:  Colonoscopy 75134  Provider Name: Dr. Philomena Dunbar  Date:    From-5/9/19 To-6/10/19  Location:  [facility]  Sedation:  MAC  Time:    From-0945 To-1230 (pt is aware to arrive at 1130)   Prep:  Colyte, sent new instructions via [employer]  Meds/Allergies Re

## 2019-03-27 RX ORDER — DILTIAZEM HYDROCHLORIDE 240 MG/1
CAPSULE, COATED, EXTENDED RELEASE ORAL
Qty: 90 CAPSULE | Refills: 0 | Status: SHIPPED | OUTPATIENT
Start: 2019-03-27 | End: 2019-06-17

## 2019-04-01 RX ORDER — PRAVASTATIN SODIUM 20 MG
TABLET ORAL
Qty: 90 TABLET | Refills: 0 | Status: SHIPPED | OUTPATIENT
Start: 2019-04-01 | End: 2019-06-26

## 2019-04-03 RX ORDER — CHLORTHALIDONE 25 MG/1
TABLET ORAL
Qty: 90 TABLET | Refills: 1 | Status: SHIPPED | OUTPATIENT
Start: 2019-04-03 | End: 2019-09-28

## 2019-06-03 ENCOUNTER — OFFICE VISIT (OUTPATIENT)
Dept: PODIATRY CLINIC | Facility: CLINIC | Age: 69
End: 2019-06-03
Payer: COMMERCIAL

## 2019-06-03 DIAGNOSIS — E11.8 TYPE 2 DIABETES MELLITUS WITH COMPLICATION, WITHOUT LONG-TERM CURRENT USE OF INSULIN (HCC): Primary | ICD-10-CM

## 2019-06-03 PROCEDURE — 99203 OFFICE O/P NEW LOW 30 MIN: CPT | Performed by: PODIATRIST

## 2019-06-03 NOTE — PROGRESS NOTES
Olga Schuster is a 77year old female.  Patient presents with:  Diabetic Foot Care: Consult - last UnN8V=1.9 from 4/5/2018 and LOV with PCP Dr Evangelina Mace 2/19/19 -  has no c/o regarding her feet - she did not checked her BS this AM         HPI:   This nancy Cyanocobalamin (VITAMIN B 12) 100 MCG Oral Lozenge Take 1,000 mg by mouth daily. Disp:  Rfl:    Coenzyme Q10 (COQ10) 200 MG Oral Cap Take 1 tablet by mouth daily. Disp:  Rfl:    Turmeric Curcumin 500 MG Oral Cap Take 1 tablet by mouth daily.  Disp:  Rfl: to be of normal thickness 1-5 bilateral feet there is no significant incurvation on the hallux nails. 2. Vascular: The patient has palpable dorsalis pedis and posterior tibial pulses bilateral lower extremities   3.  Neurologic: The patient has intact sen

## 2019-06-10 ENCOUNTER — ANESTHESIA EVENT (OUTPATIENT)
Dept: ENDOSCOPY | Facility: HOSPITAL | Age: 69
End: 2019-06-10
Payer: MEDICARE

## 2019-06-10 ENCOUNTER — ANESTHESIA (OUTPATIENT)
Dept: ENDOSCOPY | Facility: HOSPITAL | Age: 69
End: 2019-06-10
Payer: MEDICARE

## 2019-06-10 ENCOUNTER — HOSPITAL ENCOUNTER (OUTPATIENT)
Facility: HOSPITAL | Age: 69
Setting detail: HOSPITAL OUTPATIENT SURGERY
Discharge: HOME | End: 2019-06-10
Attending: INTERNAL MEDICINE | Admitting: INTERNAL MEDICINE
Payer: MEDICARE

## 2019-06-10 DIAGNOSIS — Z12.11 SCREEN FOR COLON CANCER: ICD-10-CM

## 2019-06-10 PROCEDURE — 45385 COLONOSCOPY W/LESION REMOVAL: CPT | Performed by: INTERNAL MEDICINE

## 2019-06-10 PROCEDURE — 0DBP8ZX EXCISION OF RECTUM, VIA NATURAL OR ARTIFICIAL OPENING ENDOSCOPIC, DIAGNOSTIC: ICD-10-PCS | Performed by: INTERNAL MEDICINE

## 2019-06-10 PROCEDURE — 0DBK8ZX EXCISION OF ASCENDING COLON, VIA NATURAL OR ARTIFICIAL OPENING ENDOSCOPIC, DIAGNOSTIC: ICD-10-PCS | Performed by: INTERNAL MEDICINE

## 2019-06-10 PROCEDURE — 0DBL8ZX EXCISION OF TRANSVERSE COLON, VIA NATURAL OR ARTIFICIAL OPENING ENDOSCOPIC, DIAGNOSTIC: ICD-10-PCS | Performed by: INTERNAL MEDICINE

## 2019-06-10 RX ORDER — SODIUM CHLORIDE, SODIUM LACTATE, POTASSIUM CHLORIDE, CALCIUM CHLORIDE 600; 310; 30; 20 MG/100ML; MG/100ML; MG/100ML; MG/100ML
INJECTION, SOLUTION INTRAVENOUS CONTINUOUS
Status: DISCONTINUED | OUTPATIENT
Start: 2019-06-10 | End: 2019-06-10

## 2019-06-10 RX ORDER — QUINAPRIL 20 MG/1
TABLET ORAL
Qty: 90 TABLET | Refills: 1 | Status: SHIPPED | OUTPATIENT
Start: 2019-06-10 | End: 2019-12-10

## 2019-06-10 RX ADMIN — SODIUM CHLORIDE, SODIUM LACTATE, POTASSIUM CHLORIDE, CALCIUM CHLORIDE: 600; 310; 30; 20 INJECTION, SOLUTION INTRAVENOUS at 13:00:00

## 2019-06-10 RX ADMIN — SODIUM CHLORIDE, SODIUM LACTATE, POTASSIUM CHLORIDE, CALCIUM CHLORIDE: 600; 310; 30; 20 INJECTION, SOLUTION INTRAVENOUS at 12:35:00

## 2019-06-10 NOTE — TELEPHONE ENCOUNTER
Refill passed per [facility], [facility] protocol.   Hypertensive Medications  Protocol Criteria:  · Appointment scheduled in the past 6 months or in the next 3 months  · BMP or CMP in the past 12 months  · Creatinine result < 2  Recent Outpatient Visits

## 2019-06-10 NOTE — OPERATIVE REPORT
[facility] HOSP - [facility] Endoscopy Report      Preoperative Diagnosis:  - colon cancer screening    Postoperative Diagnosis:  - colon polyps x 3  - pan-diverticular disease  - internal hemorrhoids  - lipoma 1.2 cm ascending colon    Procedure:    Colonos Repeat colonoscopy in 3- 5 years  - High fiber diet for diverticular disease  - Symptomatic treatment of hemorrhoids          Efrem Og.  Philomena Dunbar MD  6/10/2019  1:02 PM

## 2019-06-10 NOTE — ANESTHESIA PREPROCEDURE EVALUATION
Anesthesia PreOp Note    HPI:     Katherine Jorgensen is a 77year old female who presents for preoperative consultation requested by: Mason Galvan MD    Date of Surgery: 6/10/2019    Procedure(s):  COLONOSCOPY  Indication: Screen for colon cancer    Rele Conductive hearing loss, unilateral         Date Noted: 12/28/2012      Wound infection         Date Noted: 10/25/2009      Arthritis         Date Noted: 09/29/2009      Diabetes [facility])         Date Noted: 09/29/2009        Past Medical History:   Diagnosis Disp:  Rfl: 0 Taking   Cholecalciferol (VITAMIN D) 1000 UNITS Oral Tab Take 5 capsules by mouth daily. Disp:  Rfl:  6/8/2019   Cyanocobalamin (VITAMIN B 12) 100 MCG Oral Lozenge Take 1,000 mg by mouth daily.  Disp:  Rfl:  6/8/2019   Coenzyme Q10 (COQ10) 200 Substance and Sexual Activity      Alcohol use: No      Drug use: No      Sexual activity: Not on file    Lifestyle      Physical activity:        Days per week: Not on file        Minutes per session: Not on file      Stress: Not on file    Relationships including possible dental damage if relevant, major complications, and any alternative forms of anesthetic management. All of the patient's questions were answered to the best of my ability. The patient desires the anesthetic management as planned.   Rose Sides

## 2019-06-10 NOTE — ANESTHESIA POSTPROCEDURE EVALUATION
Patient: Kayla Park    Procedure Summary     Date:  06/10/19 Room / Location:  [address] [place] [address] ENDOSCOPY 01 / [address] [place] [address] ENDOSCOPY    Anesthesia Start:  4362 Anesthesia Stop:  4753    Procedure:  COLONOSCOPY (N/A ) Diagnosis:       Screen for colon cancer      (colon po

## 2019-06-10 NOTE — H&P
History & Physical Examination    Patient Name: Chery Lauren  MRN: [id number]  CSN: [id number]  YOB: 1950    Diagnosis: colon cancer screening    Facility-Administered Medications Prior to Admission:  Lidocaine HCl (PF) (XYLOCAINE) 1 % inj EVERY DAY Disp: 90 capsule Rfl: 0 Taking   Prazosin HCl 1 MG Oral Cap Take 1 capsule (1 mg total) by mouth 2 (two) times daily.  Disp: 90 capsule Rfl: 0 Not Taking   silver sulfADIAZINE (SILVADENE) 1 % External Cream Apply to burn with sterile applicator (q

## 2019-06-11 VITALS
HEIGHT: 62 IN | WEIGHT: 250 LBS | SYSTOLIC BLOOD PRESSURE: 132 MMHG | DIASTOLIC BLOOD PRESSURE: 57 MMHG | HEART RATE: 71 BPM | RESPIRATION RATE: 20 BRPM | OXYGEN SATURATION: 100 % | BODY MASS INDEX: 46.01 KG/M2

## 2019-06-14 RX ORDER — ALENDRONATE SODIUM 70 MG/1
70 TABLET ORAL WEEKLY
Qty: 12 TABLET | Refills: 1 | Status: SHIPPED | OUTPATIENT
Start: 2019-06-14 | End: 2019-11-25

## 2019-06-14 NOTE — TELEPHONE ENCOUNTER
Refill passed per [facility], [facility] protocol.   Refill Protocol Appointment Criteria  · Appointment scheduled in the past 12 months or in the next 3 months  Recent Outpatient Visits            1 week ago Type 2 diabetes mellitus with complication, without julio

## 2019-06-17 RX ORDER — DILTIAZEM HYDROCHLORIDE 240 MG/1
CAPSULE, COATED, EXTENDED RELEASE ORAL
Qty: 90 CAPSULE | Refills: 1 | Status: SHIPPED | OUTPATIENT
Start: 2019-06-17 | End: 2019-12-10

## 2019-06-17 NOTE — TELEPHONE ENCOUNTER
Refill passed per [address] [facility] protocol.   Hypertensive Medications  Protocol Criteria:  · Appointment scheduled in the past 6 months or in the next 3 months  · BMP or CMP in the past 12 months  · Creatinine result < 2  Recent Outpatient Visits

## 2019-06-26 RX ORDER — PRAVASTATIN SODIUM 20 MG
TABLET ORAL
Qty: 90 TABLET | Refills: 1 | Status: SHIPPED | OUTPATIENT
Start: 2019-06-26 | End: 2019-12-30

## 2019-06-26 NOTE — TELEPHONE ENCOUNTER
Refill passed per [facility] protocol.   Cholesterol Medications  Protocol Criteria:  · Appointment scheduled in the past 12 months or in the next 3 months  · ALT & LDL on file in the past 12 months  · ALT result < 80  · LDL result <130   Recent Outpat

## 2019-07-27 ENCOUNTER — HOSPITAL ENCOUNTER (OUTPATIENT)
Dept: MAMMOGRAPHY | Facility: HOSPITAL | Age: 69
Discharge: HOME | End: 2019-07-27
Attending: INTERNAL MEDICINE
Payer: MEDICARE

## 2019-07-27 DIAGNOSIS — Z12.39 SCREENING FOR BREAST CANCER: ICD-10-CM

## 2019-07-27 PROCEDURE — 77067 SCR MAMMO BI INCL CAD: CPT | Performed by: INTERNAL MEDICINE

## 2019-07-27 PROCEDURE — 77063 BREAST TOMOSYNTHESIS BI: CPT | Performed by: INTERNAL MEDICINE

## 2019-09-29 NOTE — TELEPHONE ENCOUNTER
Please review; protocol failed.     Requested Prescriptions     Pending Prescriptions Disp Refills   • CHLORTHALIDONE 25 MG Oral Tab [Pharmacy Med Name: CHLORTHALIDONE 25 MG TABLET] 90 tablet 1     Sig: TAKE 1 TABLET BY MOUTH EVERY DAY         Recent Visits

## 2019-09-30 RX ORDER — CHLORTHALIDONE 25 MG/1
TABLET ORAL
Qty: 90 TABLET | Refills: 1 | Status: SHIPPED | OUTPATIENT
Start: 2019-09-30 | End: 2020-03-30

## 2019-10-13 NOTE — TELEPHONE ENCOUNTER
Please review; protocol failed. Requested Prescriptions   Pending Prescriptions Disp Refills   • METFORMIN  MG Oral Tab [Pharmacy Med Name: METFORMIN  MG TABLET] 90 tablet 0     Sig: TAKE 1 TABLET (500 MG TOTAL) BY MOUTH BEFORE DINNER.

## 2019-11-01 ENCOUNTER — OFFICE VISIT (OUTPATIENT)
Dept: INTERNAL MEDICINE CLINIC | Facility: CLINIC | Age: 69
End: 2019-11-01
Payer: COMMERCIAL

## 2019-11-01 VITALS
HEIGHT: 62 IN | BODY MASS INDEX: 45.82 KG/M2 | WEIGHT: 249 LBS | TEMPERATURE: 98 F | SYSTOLIC BLOOD PRESSURE: 114 MMHG | HEART RATE: 83 BPM | RESPIRATION RATE: 18 BRPM | DIASTOLIC BLOOD PRESSURE: 75 MMHG

## 2019-11-01 DIAGNOSIS — I72.9 ANEURYSM (HCC): ICD-10-CM

## 2019-11-01 DIAGNOSIS — M79.651 RIGHT THIGH PAIN: ICD-10-CM

## 2019-11-01 DIAGNOSIS — E11.00 TYPE 2 DIABETES MELLITUS WITH HYPEROSMOLARITY WITHOUT COMA, WITHOUT LONG-TERM CURRENT USE OF INSULIN (HCC): Primary | ICD-10-CM

## 2019-11-01 PROCEDURE — 99214 OFFICE O/P EST MOD 30 MIN: CPT | Performed by: INTERNAL MEDICINE

## 2019-11-01 NOTE — PATIENT INSTRUCTIONS
Right thigh /leg pain - referal for physiatry  Dm type 2 will check hbaic - advised her to watch her diet , avoid carbohydrates , check blood sugar at home and bring log book, I send new accucheck machine , follow up with ophthalomology

## 2019-11-01 NOTE — PROGRESS NOTES
HPI:    Patient ID: Nilda Moscoso is a 77year old female. HPI she is here today  Complaining of pain in her right thigh . Is ongoing  for 2 months , no swelling , pain comes and goes.  When it comes lasts few days - dull pain, she doesn't anything  Fo Hematological: Negative for adenopathy. Does not bruise/bleed easily. Psychiatric/Behavioral: Negative for agitation, behavioral problems, confusion, hallucinations and sleep disturbance. The patient is not nervous/anxious.           METFORMIN  MG • Diabetes ([facility] [address]    • High blood pressure    • High cholesterol    • Sleep apnea       Past Surgical History:   Procedure Laterality Date   • BRAIN SURGERY  2012    fluid leaking from brain    • COLONOSCOPY     • COLONOSCOPY N/A 6/10/2019    Performed by L Cardiovascular: Normal rate, regular rhythm, normal heart sounds and intact distal pulses. Exam reveals no gallop and no friction rub. No murmur heard. Pulmonary/Chest: Effort normal and breath sounds normal. No accessory muscle usage.  No respiratory di

## 2019-11-02 ENCOUNTER — APPOINTMENT (OUTPATIENT)
Dept: LAB | Facility: HOSPITAL | Age: 69
End: 2019-11-02
Attending: INTERNAL MEDICINE
Payer: MEDICARE

## 2019-11-02 PROCEDURE — 36415 COLL VENOUS BLD VENIPUNCTURE: CPT | Performed by: INTERNAL MEDICINE

## 2019-11-02 PROCEDURE — 83036 HEMOGLOBIN GLYCOSYLATED A1C: CPT | Performed by: INTERNAL MEDICINE

## 2019-11-08 RX ORDER — BLOOD-GLUCOSE METER
EACH MISCELLANEOUS
Qty: 1 KIT | Refills: 0 | Status: SHIPPED | OUTPATIENT
Start: 2019-11-08

## 2019-11-08 RX ORDER — LANCETS 33 GAUGE
EACH MISCELLANEOUS
Qty: 100 EACH | Refills: 3 | Status: SHIPPED | OUTPATIENT
Start: 2019-11-08

## 2019-11-08 NOTE — TELEPHONE ENCOUNTER
DME sent 11/1/19. Pharmacy needs separate prescriptions.      Diabetic Supplies  Protocol Criteria:  · Appointment scheduled in past 12 months or the next 3 months

## 2019-11-12 ENCOUNTER — HOSPITAL ENCOUNTER (OUTPATIENT)
Dept: GENERAL RADIOLOGY | Facility: HOSPITAL | Age: 69
Discharge: HOME | End: 2019-11-12
Attending: PHYSICAL MEDICINE & REHABILITATION
Payer: MEDICARE

## 2019-11-12 ENCOUNTER — OFFICE VISIT (OUTPATIENT)
Dept: NEUROLOGY | Facility: CLINIC | Age: 69
End: 2019-11-12
Payer: COMMERCIAL

## 2019-11-12 VITALS
WEIGHT: 249 LBS | BODY MASS INDEX: 45.82 KG/M2 | DIASTOLIC BLOOD PRESSURE: 84 MMHG | HEART RATE: 88 BPM | HEIGHT: 62 IN | SYSTOLIC BLOOD PRESSURE: 138 MMHG

## 2019-11-12 DIAGNOSIS — M16.11 OSTEOARTHRITIS OF RIGHT HIP, UNSPECIFIED OSTEOARTHRITIS TYPE: ICD-10-CM

## 2019-11-12 DIAGNOSIS — M17.0 PRIMARY OSTEOARTHRITIS OF KNEES, BILATERAL: ICD-10-CM

## 2019-11-12 DIAGNOSIS — E66.01 OBESITY, CLASS III, BMI 40-49.9 (MORBID OBESITY) (HCC): ICD-10-CM

## 2019-11-12 DIAGNOSIS — M16.11 OSTEOARTHRITIS OF RIGHT HIP, UNSPECIFIED OSTEOARTHRITIS TYPE: Primary | ICD-10-CM

## 2019-11-12 DIAGNOSIS — E11.8 TYPE 2 DIABETES MELLITUS WITH COMPLICATION, WITHOUT LONG-TERM CURRENT USE OF INSULIN (HCC): ICD-10-CM

## 2019-11-12 DIAGNOSIS — I10 ESSENTIAL HYPERTENSION: ICD-10-CM

## 2019-11-12 DIAGNOSIS — M43.06 SPONDYLOLYSIS, LUMBAR REGION: ICD-10-CM

## 2019-11-12 PROCEDURE — 73522 X-RAY EXAM HIPS BI 3-4 VIEWS: CPT | Performed by: PHYSICAL MEDICINE & REHABILITATION

## 2019-11-12 PROCEDURE — 99204 OFFICE O/P NEW MOD 45 MIN: CPT | Performed by: PHYSICAL MEDICINE & REHABILITATION

## 2019-11-12 NOTE — PATIENT INSTRUCTIONS
-Start physical therapy and home exercises  -Mobic daily for the next 7-10 days and then as needed  -Ice/Heat as tolerated  -Xray on the way out today  -Please stop the medication if you have any side effects and call the office if you have any questions o

## 2019-11-12 NOTE — PROGRESS NOTES
[address] [facility]  NEW PATIENT EVALUATION    Consultation as a request of Dr. Viviane Mendoza    Chief Complaint: right hip pain.     HISTORY OF PRESENT ILLNESS:   Patient presents with:  Leg Pain: Patient presents today c/o [facility]) 2009    Uterine   • Cancer determined by uterine cervix biopsy ([facility] [address] 2009   • [facility]    • High blood pressure    • High cholesterol    • Sleep apnea          PAST SURGICAL HISTORY:     Past Surgical History:   Procedure Laterality Date   • BR by mouth daily.            ALLERGIES:   No Known Allergies      FAMILY HISTORY:     Family History   Problem Relation Age of Onset   • Other (Other) Mother    • Cancer Self 62        uterine   • Cancer Brother 72        pancreatic          SOCIAL HISTORY: No lower extremity edema bilaterally   Skin: No lesions noted   Cognition: alert & oriented x 3, attentive, able to follow 2 step commands, comprehention intact, spontaneous speech intact  Psychiatric: Mood and affect appropriate    Gait Antalgic right cali and discussed with patient. ASSESSMENT:     1. Osteoarthritis of right hip, unspecified osteoarthritis type    2. Type 2 diabetes mellitus with complication, without long-term current use of insulin ([facility] [address]    3. Essential hypertension    4.  Primary osteo

## 2019-11-18 PROBLEM — M25.562 CHRONIC PAIN OF LEFT KNEE: Status: ACTIVE | Noted: 2017-01-29

## 2019-11-18 PROBLEM — Z01.818 PRE-OP TESTING: Status: ACTIVE | Noted: 2017-12-28

## 2019-11-18 PROBLEM — G89.29 CHRONIC PAIN OF LEFT KNEE: Status: ACTIVE | Noted: 2017-01-29

## 2019-11-25 RX ORDER — ALENDRONATE SODIUM 70 MG/1
70 TABLET ORAL WEEKLY
Qty: 12 TABLET | Refills: 1 | Status: SHIPPED | OUTPATIENT
Start: 2019-11-25 | End: 2020-05-14

## 2019-11-26 NOTE — TELEPHONE ENCOUNTER
Refill passed per [address] [facility] protocol.   Refill Protocol Appointment Criteria  · Appointment scheduled in the past 12 months or in the next 3 months  Recent Outpatient Visits            1 week ago Primary osteoarthritis of left knee    [address] [facility] ORTHOPAEDICS

## 2019-11-30 ENCOUNTER — HOSPITAL ENCOUNTER (OUTPATIENT)
Dept: GENERAL RADIOLOGY | Facility: HOSPITAL | Age: 69
Discharge: HOME | End: 2019-11-30
Attending: ORTHOPAEDIC SURGERY
Payer: MEDICARE

## 2019-11-30 ENCOUNTER — APPOINTMENT (OUTPATIENT)
Dept: LAB | Facility: HOSPITAL | Age: 69
End: 2019-11-30
Attending: ORTHOPAEDIC SURGERY
Payer: MEDICARE

## 2019-11-30 DIAGNOSIS — Z01.818 PRE-OP TESTING: ICD-10-CM

## 2019-11-30 PROCEDURE — 85730 THROMBOPLASTIN TIME PARTIAL: CPT | Performed by: ORTHOPAEDIC SURGERY

## 2019-11-30 PROCEDURE — 36415 COLL VENOUS BLD VENIPUNCTURE: CPT | Performed by: INTERNAL MEDICINE

## 2019-11-30 PROCEDURE — 86901 BLOOD TYPING SEROLOGIC RH(D): CPT

## 2019-11-30 PROCEDURE — 71046 X-RAY EXAM CHEST 2 VIEWS: CPT | Performed by: ORTHOPAEDIC SURGERY

## 2019-11-30 PROCEDURE — 83036 HEMOGLOBIN GLYCOSYLATED A1C: CPT | Performed by: INTERNAL MEDICINE

## 2019-11-30 PROCEDURE — 85025 COMPLETE CBC W/AUTO DIFF WBC: CPT | Performed by: INTERNAL MEDICINE

## 2019-11-30 PROCEDURE — 81003 URINALYSIS AUTO W/O SCOPE: CPT | Performed by: ORTHOPAEDIC SURGERY

## 2019-11-30 PROCEDURE — 86900 BLOOD TYPING SEROLOGIC ABO: CPT

## 2019-11-30 PROCEDURE — 85610 PROTHROMBIN TIME: CPT | Performed by: ORTHOPAEDIC SURGERY

## 2019-11-30 PROCEDURE — 80053 COMPREHEN METABOLIC PANEL: CPT | Performed by: ORTHOPAEDIC SURGERY

## 2019-11-30 PROCEDURE — 93005 ELECTROCARDIOGRAM TRACING: CPT

## 2019-11-30 PROCEDURE — 86850 RBC ANTIBODY SCREEN: CPT

## 2019-11-30 PROCEDURE — 87641 MR-STAPH DNA AMP PROBE: CPT

## 2019-11-30 PROCEDURE — 93010 ELECTROCARDIOGRAM REPORT: CPT | Performed by: ORTHOPAEDIC SURGERY

## 2019-12-03 ENCOUNTER — OFFICE VISIT (OUTPATIENT)
Dept: INTERNAL MEDICINE CLINIC | Facility: CLINIC | Age: 69
End: 2019-12-03
Payer: COMMERCIAL

## 2019-12-03 VITALS
HEIGHT: 62 IN | HEART RATE: 74 BPM | BODY MASS INDEX: 44.53 KG/M2 | TEMPERATURE: 98 F | WEIGHT: 242 LBS | SYSTOLIC BLOOD PRESSURE: 112 MMHG | DIASTOLIC BLOOD PRESSURE: 73 MMHG | RESPIRATION RATE: 18 BRPM

## 2019-12-03 DIAGNOSIS — M17.12 PRIMARY OSTEOARTHRITIS OF LEFT KNEE: Primary | ICD-10-CM

## 2019-12-03 DIAGNOSIS — Z01.810 PREOP CARDIOVASCULAR EXAM: ICD-10-CM

## 2019-12-03 PROBLEM — M79.641 RIGHT HAND PAIN: Status: RESOLVED | Noted: 2018-11-27 | Resolved: 2019-12-03

## 2019-12-03 PROBLEM — Z01.818 PRE-OP TESTING: Status: RESOLVED | Noted: 2017-12-28 | Resolved: 2019-12-03

## 2019-12-03 PROCEDURE — 99214 OFFICE O/P EST MOD 30 MIN: CPT | Performed by: INTERNAL MEDICINE

## 2019-12-03 NOTE — PROGRESS NOTES
Chery Lauren is a 77year old female who presents for a pre-opera mg by mouth daily. • Coenzyme Q10 (COQ10) 200 MG Oral Cap Take 1 tablet by mouth daily. • Turmeric Curcumin 500 MG Oral Cap Take 1 tablet by mouth daily.         Allergies: No Known Allergies   Past Medical History:   Diagnosis Date   • Arthritis asthma    EXAM:   Pulse 74   Temp 98 °F (36.7 °C) (Oral)   Resp 18   Ht 5' 2\" (1.575 m)   Wt 242 lb (109.8 kg)   BMI 44.26 kg/m²  Body mass index is 44.26 kg/m².   GENERAL: well developed, well nourished,in no apparent distress  SKIN: no rashes,no suspicio orders of the defined types were placed in this encounter. Medications filled today:  Requested Prescriptions      No prescriptions requested or ordered in this encounter     Radiology orders:  None    No follow-ups on file.

## 2019-12-05 ENCOUNTER — MED REC SCAN ONLY (OUTPATIENT)
Dept: NEUROLOGY | Facility: CLINIC | Age: 69
End: 2019-12-05

## 2019-12-07 RX ORDER — ASPIRIN 81 MG/1
81 TABLET ORAL DAILY
Status: ON HOLD | COMMUNITY
End: 2019-12-13

## 2019-12-10 ENCOUNTER — OFFICE VISIT (OUTPATIENT)
Dept: NEUROLOGY | Facility: CLINIC | Age: 69
End: 2019-12-10
Payer: COMMERCIAL

## 2019-12-10 VITALS
WEIGHT: 242 LBS | DIASTOLIC BLOOD PRESSURE: 78 MMHG | HEART RATE: 72 BPM | HEIGHT: 62 IN | SYSTOLIC BLOOD PRESSURE: 162 MMHG | RESPIRATION RATE: 18 BRPM | BODY MASS INDEX: 44.53 KG/M2

## 2019-12-10 DIAGNOSIS — E11.8 TYPE 2 DIABETES MELLITUS WITH COMPLICATION, WITHOUT LONG-TERM CURRENT USE OF INSULIN (HCC): ICD-10-CM

## 2019-12-10 DIAGNOSIS — M17.0 PRIMARY OSTEOARTHRITIS OF KNEES, BILATERAL: ICD-10-CM

## 2019-12-10 DIAGNOSIS — I10 ESSENTIAL HYPERTENSION: ICD-10-CM

## 2019-12-10 DIAGNOSIS — M43.06 SPONDYLOLYSIS, LUMBAR REGION: ICD-10-CM

## 2019-12-10 DIAGNOSIS — M16.11 OSTEOARTHRITIS OF RIGHT HIP, UNSPECIFIED OSTEOARTHRITIS TYPE: Primary | ICD-10-CM

## 2019-12-10 DIAGNOSIS — E66.01 OBESITY, CLASS III, BMI 40-49.9 (MORBID OBESITY) (HCC): ICD-10-CM

## 2019-12-10 PROCEDURE — 99213 OFFICE O/P EST LOW 20 MIN: CPT | Performed by: PHYSICAL MEDICINE & REHABILITATION

## 2019-12-10 RX ORDER — QUINAPRIL 20 MG/1
20 TABLET ORAL DAILY
Qty: 90 TABLET | Refills: 1 | Status: SHIPPED | OUTPATIENT
Start: 2019-12-10 | End: 2020-06-03

## 2019-12-10 RX ORDER — DILTIAZEM HYDROCHLORIDE 240 MG/1
240 CAPSULE, COATED, EXTENDED RELEASE ORAL NIGHTLY
Qty: 90 CAPSULE | Refills: 1 | Status: SHIPPED | OUTPATIENT
Start: 2019-12-10 | End: 2020-04-11

## 2019-12-10 NOTE — TELEPHONE ENCOUNTER
Refills passed protocol but could you please clarify which one you would like pt to take in the evening? Pt reported takeing diltazem at night, but sig shows opposite that it is supposed to be the quinapril in the evening?   Refill passed per [facility], [facility] [phone] 11/30/2019

## 2019-12-10 NOTE — PROGRESS NOTES
[address] [facility]  NEW PATIENT EVALUATION    Consultation as a request of Dr. June Velazquez    Chief Complaint: right hip pain.     HISTORY OF PRESENT ILLNESS:   Patient presents with:  Hip Pain: LOV 11/12/19 for left hip p replacement. She is currently doing water aerobics at [employer] but has not had any physical therapy. She is taking Mobic for the knees as needed which seems to help the knee pain as well as the hip pain.   She denies any recent fevers chills or weight los tablet by mouth daily. • Turmeric Curcumin 500 MG Oral Cap Take 1 tablet by mouth 2 (two) times daily.        • ONETOUCH ULTRA BLUE In Vitro Strip USE TO TEST BLOOD SUGAR ONCE DAILY 100 strip 3   • ONETOUCH DELICA PLUS [id number] Does not apply Misc USE vision, polydipsia, polyphagia and polyuria  Allergic/Immunologic: negative for urticaria      PHYSICAL EXAM:   BP (!) 162/78   Pulse 72   Resp 18   Ht 62\"   Wt 242 lb (109.8 kg)   BMI 44.26 kg/m²   General: No immediate distress  Head: Normocephalic/ Atr ANIONGAP 5 11/30/2019    GFRNAA 58 (L) 11/30/2019    GFRAA 67 11/30/2019    CA 9.8 11/30/2019    OSMOCALC 291 11/30/2019    ALKPHO 69 11/30/2019    AST 17 11/30/2019    ALT 24 11/30/2019    BILT 0.4 11/30/2019    TP 7.9 11/30/2019    ALB 4.0 11/30/2019

## 2019-12-13 ENCOUNTER — ANESTHESIA EVENT (OUTPATIENT)
Dept: SURGERY | Facility: HOSPITAL | Age: 69
DRG: 470 | End: 2019-12-13
Payer: MEDICARE

## 2019-12-13 ENCOUNTER — ANESTHESIA (OUTPATIENT)
Dept: SURGERY | Facility: HOSPITAL | Age: 69
DRG: 470 | End: 2019-12-13
Payer: MEDICARE

## 2019-12-13 ENCOUNTER — HOSPITAL ENCOUNTER (INPATIENT)
Facility: HOSPITAL | Age: 69
LOS: 3 days | Discharge: NURSING, REHAB OR LONG-TERM CARE | DRG: 470 | End: 2019-12-16
Attending: ORTHOPAEDIC SURGERY | Admitting: ORTHOPAEDIC SURGERY
Payer: MEDICARE

## 2019-12-13 ENCOUNTER — APPOINTMENT (OUTPATIENT)
Dept: GENERAL RADIOLOGY | Facility: HOSPITAL | Age: 69
DRG: 470 | End: 2019-12-13
Attending: ORTHOPAEDIC SURGERY
Payer: MEDICARE

## 2019-12-13 PROCEDURE — 0SRD0J9 REPLACEMENT OF LEFT KNEE JOINT WITH SYNTHETIC SUBSTITUTE, CEMENTED, OPEN APPROACH: ICD-10-PCS | Performed by: ORTHOPAEDIC SURGERY

## 2019-12-13 PROCEDURE — 73560 X-RAY EXAM OF KNEE 1 OR 2: CPT | Performed by: ORTHOPAEDIC SURGERY

## 2019-12-13 PROCEDURE — 99232 SBSQ HOSP IP/OBS MODERATE 35: CPT | Performed by: INTERNAL MEDICINE

## 2019-12-13 DEVICE — EVOLUTION® MP TIB KEELED NONPOR SIZE 5 STANDARD LEFT
Type: IMPLANTABLE DEVICE | Site: KNEE | Status: FUNCTIONAL
Brand: EVOLUTION

## 2019-12-13 DEVICE — EVOLUTION®MP FEM CS/CR NON-POR SIZE 5 PRIMARY LEFT
Type: IMPLANTABLE DEVICE | Site: KNEE | Status: FUNCTIONAL
Brand: EVOLUTION

## 2019-12-13 DEVICE — ADVANCE® ONLAY ALL-POLY PATELLA 35MM TRI-PEG
Type: IMPLANTABLE DEVICE | Site: KNEE | Status: FUNCTIONAL
Brand: ADVANCE®

## 2019-12-13 DEVICE — EVOLUTION® MP™ CS INSERT SIZE 5 STANDARD 20MM LEFT
Type: IMPLANTABLE DEVICE | Site: KNEE | Status: FUNCTIONAL
Brand: EVOLUTION

## 2019-12-13 DEVICE — BIOMET BC R 1X40 US: Type: IMPLANTABLE DEVICE | Site: KNEE | Status: FUNCTIONAL

## 2019-12-13 RX ORDER — ACETAMINOPHEN 500 MG
1000 TABLET ORAL ONCE
Status: COMPLETED | OUTPATIENT
Start: 2019-12-13 | End: 2019-12-13

## 2019-12-13 RX ORDER — OXYCODONE HYDROCHLORIDE AND ACETAMINOPHEN 5; 325 MG/1; MG/1
2 TABLET ORAL EVERY 4 HOURS PRN
Status: DISCONTINUED | OUTPATIENT
Start: 2019-12-13 | End: 2019-12-16

## 2019-12-13 RX ORDER — DEXAMETHASONE SODIUM PHOSPHATE 4 MG/ML
VIAL (ML) INJECTION AS NEEDED
Status: DISCONTINUED | OUTPATIENT
Start: 2019-12-13 | End: 2019-12-13 | Stop reason: SURG

## 2019-12-13 RX ORDER — SODIUM CHLORIDE, SODIUM LACTATE, POTASSIUM CHLORIDE, CALCIUM CHLORIDE 600; 310; 30; 20 MG/100ML; MG/100ML; MG/100ML; MG/100ML
INJECTION, SOLUTION INTRAVENOUS CONTINUOUS
Status: DISCONTINUED | OUTPATIENT
Start: 2019-12-13 | End: 2019-12-16

## 2019-12-13 RX ORDER — BUPIVACAINE HYDROCHLORIDE 7.5 MG/ML
INJECTION, SOLUTION INTRASPINAL
Status: COMPLETED | OUTPATIENT
Start: 2019-12-13 | End: 2019-12-13

## 2019-12-13 RX ORDER — TRANEXAMIC ACID 10 MG/ML
INJECTION, SOLUTION INTRAVENOUS AS NEEDED
Status: DISCONTINUED | OUTPATIENT
Start: 2019-12-13 | End: 2019-12-13 | Stop reason: SURG

## 2019-12-13 RX ORDER — DIPHENHYDRAMINE HCL 25 MG
25 CAPSULE ORAL EVERY 4 HOURS PRN
Status: DISCONTINUED | OUTPATIENT
Start: 2019-12-13 | End: 2019-12-16

## 2019-12-13 RX ORDER — DEXTROSE MONOHYDRATE 25 G/50ML
50 INJECTION, SOLUTION INTRAVENOUS
Status: DISCONTINUED | OUTPATIENT
Start: 2019-12-13 | End: 2019-12-13 | Stop reason: HOSPADM

## 2019-12-13 RX ORDER — CHLORTHALIDONE 25 MG/1
25 TABLET ORAL
Status: DISCONTINUED | OUTPATIENT
Start: 2019-12-13 | End: 2019-12-16

## 2019-12-13 RX ORDER — NALOXONE HYDROCHLORIDE 0.4 MG/ML
80 INJECTION, SOLUTION INTRAMUSCULAR; INTRAVENOUS; SUBCUTANEOUS AS NEEDED
Status: DISCONTINUED | OUTPATIENT
Start: 2019-12-13 | End: 2019-12-13 | Stop reason: HOSPADM

## 2019-12-13 RX ORDER — LIDOCAINE HYDROCHLORIDE 10 MG/ML
INJECTION, SOLUTION INFILTRATION; PERINEURAL
Status: COMPLETED | OUTPATIENT
Start: 2019-12-13 | End: 2019-12-13

## 2019-12-13 RX ORDER — HYDROMORPHONE HYDROCHLORIDE 1 MG/ML
0.6 INJECTION, SOLUTION INTRAMUSCULAR; INTRAVENOUS; SUBCUTANEOUS EVERY 5 MIN PRN
Status: DISCONTINUED | OUTPATIENT
Start: 2019-12-13 | End: 2019-12-13 | Stop reason: HOSPADM

## 2019-12-13 RX ORDER — PROCHLORPERAZINE EDISYLATE 5 MG/ML
5 INJECTION INTRAMUSCULAR; INTRAVENOUS ONCE AS NEEDED
Status: DISCONTINUED | OUTPATIENT
Start: 2019-12-13 | End: 2019-12-13 | Stop reason: HOSPADM

## 2019-12-13 RX ORDER — NALBUPHINE HCL 10 MG/ML
2.5 AMPUL (ML) INJECTION EVERY 4 HOURS PRN
Status: DISCONTINUED | OUTPATIENT
Start: 2019-12-13 | End: 2019-12-16

## 2019-12-13 RX ORDER — OXYCODONE HYDROCHLORIDE AND ACETAMINOPHEN 5; 325 MG/1; MG/1
1 TABLET ORAL EVERY 4 HOURS PRN
Status: DISCONTINUED | OUTPATIENT
Start: 2019-12-13 | End: 2019-12-16

## 2019-12-13 RX ORDER — POLYETHYLENE GLYCOL 3350 17 G/17G
17 POWDER, FOR SOLUTION ORAL DAILY PRN
Status: DISCONTINUED | OUTPATIENT
Start: 2019-12-13 | End: 2019-12-16

## 2019-12-13 RX ORDER — PROCHLORPERAZINE EDISYLATE 5 MG/ML
5 INJECTION INTRAMUSCULAR; INTRAVENOUS ONCE AS NEEDED
Status: ACTIVE | OUTPATIENT
Start: 2019-12-13 | End: 2019-12-13

## 2019-12-13 RX ORDER — MORPHINE SULFATE 10 MG/ML
6 INJECTION, SOLUTION INTRAMUSCULAR; INTRAVENOUS EVERY 10 MIN PRN
Status: DISCONTINUED | OUTPATIENT
Start: 2019-12-13 | End: 2019-12-13 | Stop reason: HOSPADM

## 2019-12-13 RX ORDER — ONDANSETRON 2 MG/ML
INJECTION INTRAMUSCULAR; INTRAVENOUS AS NEEDED
Status: DISCONTINUED | OUTPATIENT
Start: 2019-12-13 | End: 2019-12-13 | Stop reason: SURG

## 2019-12-13 RX ORDER — HYDROMORPHONE HYDROCHLORIDE 1 MG/ML
0.4 INJECTION, SOLUTION INTRAMUSCULAR; INTRAVENOUS; SUBCUTANEOUS
Status: DISPENSED | OUTPATIENT
Start: 2019-12-13 | End: 2019-12-14

## 2019-12-13 RX ORDER — ONDANSETRON 2 MG/ML
4 INJECTION INTRAMUSCULAR; INTRAVENOUS ONCE AS NEEDED
Status: COMPLETED | OUTPATIENT
Start: 2019-12-13 | End: 2019-12-13

## 2019-12-13 RX ORDER — PROCHLORPERAZINE EDISYLATE 5 MG/ML
10 INJECTION INTRAMUSCULAR; INTRAVENOUS EVERY 6 HOURS PRN
Status: DISPENSED | OUTPATIENT
Start: 2019-12-13 | End: 2019-12-15

## 2019-12-13 RX ORDER — HYDROCODONE BITARTRATE AND ACETAMINOPHEN 5; 325 MG/1; MG/1
2 TABLET ORAL AS NEEDED
Status: DISCONTINUED | OUTPATIENT
Start: 2019-12-13 | End: 2019-12-13 | Stop reason: HOSPADM

## 2019-12-13 RX ORDER — CEFAZOLIN SODIUM/WATER 2 G/20 ML
2 SYRINGE (ML) INTRAVENOUS ONCE
Status: COMPLETED | OUTPATIENT
Start: 2019-12-13 | End: 2019-12-13

## 2019-12-13 RX ORDER — DILTIAZEM HYDROCHLORIDE 240 MG/1
240 CAPSULE, COATED, EXTENDED RELEASE ORAL NIGHTLY
Status: DISCONTINUED | OUTPATIENT
Start: 2019-12-13 | End: 2019-12-16

## 2019-12-13 RX ORDER — ONDANSETRON 2 MG/ML
4 INJECTION INTRAMUSCULAR; INTRAVENOUS EVERY 6 HOURS PRN
Status: DISCONTINUED | OUTPATIENT
Start: 2019-12-13 | End: 2019-12-16

## 2019-12-13 RX ORDER — HALOPERIDOL 5 MG/ML
0.5 INJECTION INTRAMUSCULAR ONCE AS NEEDED
Status: ACTIVE | OUTPATIENT
Start: 2019-12-13 | End: 2019-12-13

## 2019-12-13 RX ORDER — LISINOPRIL 20 MG/1
20 TABLET ORAL DAILY
Status: DISCONTINUED | OUTPATIENT
Start: 2019-12-13 | End: 2019-12-16

## 2019-12-13 RX ORDER — HYDROMORPHONE HYDROCHLORIDE 1 MG/ML
0.4 INJECTION, SOLUTION INTRAMUSCULAR; INTRAVENOUS; SUBCUTANEOUS EVERY 5 MIN PRN
Status: DISCONTINUED | OUTPATIENT
Start: 2019-12-13 | End: 2019-12-13 | Stop reason: HOSPADM

## 2019-12-13 RX ORDER — DIPHENHYDRAMINE HYDROCHLORIDE 50 MG/ML
12.5 INJECTION INTRAMUSCULAR; INTRAVENOUS EVERY 4 HOURS PRN
Status: ACTIVE | OUTPATIENT
Start: 2019-12-13 | End: 2019-12-14

## 2019-12-13 RX ORDER — SODIUM PHOSPHATE, DIBASIC AND SODIUM PHOSPHATE, MONOBASIC 7; 19 G/133ML; G/133ML
1 ENEMA RECTAL ONCE AS NEEDED
Status: DISCONTINUED | OUTPATIENT
Start: 2019-12-13 | End: 2019-12-16

## 2019-12-13 RX ORDER — HYDROMORPHONE HYDROCHLORIDE 1 MG/ML
0.2 INJECTION, SOLUTION INTRAMUSCULAR; INTRAVENOUS; SUBCUTANEOUS EVERY 5 MIN PRN
Status: DISCONTINUED | OUTPATIENT
Start: 2019-12-13 | End: 2019-12-13 | Stop reason: HOSPADM

## 2019-12-13 RX ORDER — NALOXONE HYDROCHLORIDE 0.4 MG/ML
0.08 INJECTION, SOLUTION INTRAMUSCULAR; INTRAVENOUS; SUBCUTANEOUS
Status: ACTIVE | OUTPATIENT
Start: 2019-12-13 | End: 2019-12-14

## 2019-12-13 RX ORDER — DOCUSATE SODIUM 100 MG/1
100 CAPSULE, LIQUID FILLED ORAL 2 TIMES DAILY
Status: DISCONTINUED | OUTPATIENT
Start: 2019-12-13 | End: 2019-12-16

## 2019-12-13 RX ORDER — DIPHENHYDRAMINE HYDROCHLORIDE 50 MG/ML
25 INJECTION INTRAMUSCULAR; INTRAVENOUS ONCE AS NEEDED
Status: ACTIVE | OUTPATIENT
Start: 2019-12-13 | End: 2019-12-13

## 2019-12-13 RX ORDER — METOCLOPRAMIDE 10 MG/1
10 TABLET ORAL ONCE
Status: COMPLETED | OUTPATIENT
Start: 2019-12-13 | End: 2019-12-13

## 2019-12-13 RX ORDER — ACETAMINOPHEN 325 MG/1
650 TABLET ORAL EVERY 6 HOURS PRN
Status: ACTIVE | OUTPATIENT
Start: 2019-12-13 | End: 2019-12-14

## 2019-12-13 RX ORDER — BISACODYL 10 MG
10 SUPPOSITORY, RECTAL RECTAL
Status: DISCONTINUED | OUTPATIENT
Start: 2019-12-13 | End: 2019-12-16

## 2019-12-13 RX ORDER — HALOPERIDOL 5 MG/ML
0.25 INJECTION INTRAMUSCULAR ONCE AS NEEDED
Status: DISCONTINUED | OUTPATIENT
Start: 2019-12-13 | End: 2019-12-13 | Stop reason: HOSPADM

## 2019-12-13 RX ORDER — SODIUM CHLORIDE 0.9 % (FLUSH) 0.9 %
10 SYRINGE (ML) INJECTION AS NEEDED
Status: DISCONTINUED | OUTPATIENT
Start: 2019-12-13 | End: 2019-12-16

## 2019-12-13 RX ORDER — HYDROMORPHONE HYDROCHLORIDE 1 MG/ML
0.6 INJECTION, SOLUTION INTRAMUSCULAR; INTRAVENOUS; SUBCUTANEOUS
Status: ACTIVE | OUTPATIENT
Start: 2019-12-13 | End: 2019-12-14

## 2019-12-13 RX ORDER — MORPHINE SULFATE 4 MG/ML
4 INJECTION, SOLUTION INTRAMUSCULAR; INTRAVENOUS EVERY 10 MIN PRN
Status: DISCONTINUED | OUTPATIENT
Start: 2019-12-13 | End: 2019-12-13 | Stop reason: HOSPADM

## 2019-12-13 RX ORDER — MIDAZOLAM HYDROCHLORIDE 1 MG/ML
INJECTION INTRAMUSCULAR; INTRAVENOUS AS NEEDED
Status: DISCONTINUED | OUTPATIENT
Start: 2019-12-13 | End: 2019-12-13 | Stop reason: SURG

## 2019-12-13 RX ORDER — PRAVASTATIN SODIUM 20 MG
20 TABLET ORAL NIGHTLY
Status: DISCONTINUED | OUTPATIENT
Start: 2019-12-13 | End: 2019-12-16

## 2019-12-13 RX ORDER — DIPHENHYDRAMINE HCL 25 MG
25 CAPSULE ORAL EVERY 4 HOURS PRN
Status: ACTIVE | OUTPATIENT
Start: 2019-12-13 | End: 2019-12-14

## 2019-12-13 RX ORDER — CEFAZOLIN SODIUM/WATER 2 G/20 ML
2 SYRINGE (ML) INTRAVENOUS ONCE
Status: COMPLETED | OUTPATIENT
Start: 2019-12-14 | End: 2019-12-14

## 2019-12-13 RX ORDER — METOCLOPRAMIDE HYDROCHLORIDE 5 MG/ML
10 INJECTION INTRAMUSCULAR; INTRAVENOUS EVERY 6 HOURS PRN
Status: DISPENSED | OUTPATIENT
Start: 2019-12-13 | End: 2019-12-15

## 2019-12-13 RX ORDER — MORPHINE SULFATE 1 MG/ML
INJECTION, SOLUTION EPIDURAL; INTRATHECAL; INTRAVENOUS
Status: COMPLETED | OUTPATIENT
Start: 2019-12-13 | End: 2019-12-13

## 2019-12-13 RX ORDER — NALOXONE HYDROCHLORIDE 0.4 MG/ML
0.08 INJECTION, SOLUTION INTRAMUSCULAR; INTRAVENOUS; SUBCUTANEOUS
Status: DISCONTINUED | OUTPATIENT
Start: 2019-12-13 | End: 2019-12-16

## 2019-12-13 RX ORDER — PHENYLEPHRINE HCL 10 MG/ML
VIAL (ML) INJECTION AS NEEDED
Status: DISCONTINUED | OUTPATIENT
Start: 2019-12-13 | End: 2019-12-13 | Stop reason: SURG

## 2019-12-13 RX ORDER — EPHEDRINE SULFATE 50 MG/ML
INJECTION, SOLUTION INTRAVENOUS AS NEEDED
Status: DISCONTINUED | OUTPATIENT
Start: 2019-12-13 | End: 2019-12-13 | Stop reason: SURG

## 2019-12-13 RX ORDER — FAMOTIDINE 20 MG/1
20 TABLET ORAL ONCE
Status: COMPLETED | OUTPATIENT
Start: 2019-12-13 | End: 2019-12-13

## 2019-12-13 RX ORDER — ONDANSETRON 2 MG/ML
4 INJECTION INTRAMUSCULAR; INTRAVENOUS EVERY 4 HOURS PRN
Status: DISCONTINUED | OUTPATIENT
Start: 2019-12-13 | End: 2019-12-16

## 2019-12-13 RX ORDER — DIPHENHYDRAMINE HYDROCHLORIDE 50 MG/ML
12.5 INJECTION INTRAMUSCULAR; INTRAVENOUS EVERY 4 HOURS PRN
Status: DISCONTINUED | OUTPATIENT
Start: 2019-12-13 | End: 2019-12-16

## 2019-12-13 RX ORDER — DEXTROSE MONOHYDRATE 25 G/50ML
50 INJECTION, SOLUTION INTRAVENOUS AS NEEDED
Status: DISCONTINUED | OUTPATIENT
Start: 2019-12-13 | End: 2019-12-16

## 2019-12-13 RX ORDER — HYDROCODONE BITARTRATE AND ACETAMINOPHEN 7.5; 325 MG/1; MG/1
2 TABLET ORAL EVERY 6 HOURS PRN
Status: ACTIVE | OUTPATIENT
Start: 2019-12-13 | End: 2019-12-14

## 2019-12-13 RX ORDER — HYDROCODONE BITARTRATE AND ACETAMINOPHEN 5; 325 MG/1; MG/1
1 TABLET ORAL AS NEEDED
Status: DISCONTINUED | OUTPATIENT
Start: 2019-12-13 | End: 2019-12-13 | Stop reason: HOSPADM

## 2019-12-13 RX ORDER — ONDANSETRON 2 MG/ML
4 INJECTION INTRAMUSCULAR; INTRAVENOUS ONCE AS NEEDED
Status: ACTIVE | OUTPATIENT
Start: 2019-12-13 | End: 2019-12-13

## 2019-12-13 RX ORDER — MORPHINE SULFATE 4 MG/ML
2 INJECTION, SOLUTION INTRAMUSCULAR; INTRAVENOUS EVERY 10 MIN PRN
Status: DISCONTINUED | OUTPATIENT
Start: 2019-12-13 | End: 2019-12-13 | Stop reason: HOSPADM

## 2019-12-13 RX ORDER — HYDROCODONE BITARTRATE AND ACETAMINOPHEN 7.5; 325 MG/1; MG/1
1 TABLET ORAL EVERY 6 HOURS PRN
Status: DISPENSED | OUTPATIENT
Start: 2019-12-13 | End: 2019-12-14

## 2019-12-13 RX ORDER — NALBUPHINE HCL 10 MG/ML
2.5 AMPUL (ML) INJECTION EVERY 4 HOURS PRN
Status: ACTIVE | OUTPATIENT
Start: 2019-12-13 | End: 2019-12-14

## 2019-12-13 RX ORDER — SODIUM CHLORIDE, SODIUM LACTATE, POTASSIUM CHLORIDE, CALCIUM CHLORIDE 600; 310; 30; 20 MG/100ML; MG/100ML; MG/100ML; MG/100ML
INJECTION, SOLUTION INTRAVENOUS CONTINUOUS
Status: DISCONTINUED | OUTPATIENT
Start: 2019-12-13 | End: 2019-12-13 | Stop reason: HOSPADM

## 2019-12-13 RX ADMIN — BUPIVACAINE HYDROCHLORIDE 1.5 ML: 7.5 INJECTION, SOLUTION INTRASPINAL at 07:32:00

## 2019-12-13 RX ADMIN — SODIUM CHLORIDE, SODIUM LACTATE, POTASSIUM CHLORIDE, CALCIUM CHLORIDE: 600; 310; 30; 20 INJECTION, SOLUTION INTRAVENOUS at 11:07:00

## 2019-12-13 RX ADMIN — TRANEXAMIC ACID 1000 MG: 10 INJECTION, SOLUTION INTRAVENOUS at 10:35:00

## 2019-12-13 RX ADMIN — CEFAZOLIN SODIUM/WATER 2 G: 2 G/20 ML SYRINGE (ML) INTRAVENOUS at 07:40:00

## 2019-12-13 RX ADMIN — ONDANSETRON 4 MG: 2 INJECTION INTRAMUSCULAR; INTRAVENOUS at 07:36:00

## 2019-12-13 RX ADMIN — EPHEDRINE SULFATE 10 MG: 50 INJECTION, SOLUTION INTRAVENOUS at 07:39:00

## 2019-12-13 RX ADMIN — CEFAZOLIN SODIUM/WATER 1 G: 2 G/20 ML SYRINGE (ML) INTRAVENOUS at 07:42:00

## 2019-12-13 RX ADMIN — MIDAZOLAM HYDROCHLORIDE 2 MG: 1 INJECTION INTRAMUSCULAR; INTRAVENOUS at 07:30:00

## 2019-12-13 RX ADMIN — MORPHINE SULFATE 0.3 MG: 1 INJECTION, SOLUTION EPIDURAL; INTRATHECAL; INTRAVENOUS at 07:32:00

## 2019-12-13 RX ADMIN — TRANEXAMIC ACID 1000 MG: 10 INJECTION, SOLUTION INTRAVENOUS at 07:45:00

## 2019-12-13 RX ADMIN — PHENYLEPHRINE HCL 50 MCG: 10 MG/ML VIAL (ML) INJECTION at 07:43:00

## 2019-12-13 RX ADMIN — SODIUM CHLORIDE, SODIUM LACTATE, POTASSIUM CHLORIDE, CALCIUM CHLORIDE: 600; 310; 30; 20 INJECTION, SOLUTION INTRAVENOUS at 07:19:00

## 2019-12-13 RX ADMIN — LIDOCAINE HYDROCHLORIDE 3 ML: 10 INJECTION, SOLUTION INFILTRATION; PERINEURAL at 07:32:00

## 2019-12-13 RX ADMIN — SODIUM CHLORIDE, SODIUM LACTATE, POTASSIUM CHLORIDE, CALCIUM CHLORIDE: 600; 310; 30; 20 INJECTION, SOLUTION INTRAVENOUS at 09:50:00

## 2019-12-13 RX ADMIN — DEXAMETHASONE SODIUM PHOSPHATE 4 MG: 4 MG/ML VIAL (ML) INJECTION at 07:36:00

## 2019-12-13 NOTE — H&P
Gifty Birmingham MD   Physician   Internal Medicine   Progress Notes      Signed   Encounter Date:  12/3/2019               Signed • MELOXICAM 15 MG Oral Tab TAKE ONE TABLET BY MOUTH ONE TIME DAILY with food (Patient taking differently: Take 15 mg by mouth daily as needed.  ) 35 tablet 2   • Cholecalciferol (VITAMIN D) 1000 UNITS Oral Tab Take 5 capsules by mouth daily.       • Cyanoc HEENT: denies nasal congestion, sinus pain or sore throat  LUNGS: denies shortness of breath with exertion  CARDIOVASCULAR: denies chest pain on exertion  GI: denies abdominal pain  : denies dysuria  MUSCULOSKELETAL: denies joint pain  NEURO: denies head  Low voltage with rightward P-axis and rotation -possible pulmonary disease                    3.  Heme  No history of bleeding disorder, no evidence of significant anemia  CBC reviewed      4. Renal  No hx of renal disease  CMP reviewed            Medical

## 2019-12-13 NOTE — BRIEF OP NOTE
[address]   Brief Op Note    Patients Name: Mell Shannanlexx  Attending Physician: Ellen Rodriguez MD  Operating Physician: Merlnie Escamilla MD  CSN: [id number]     Location:  OR  MRN: [id number]    YOB: 1950  Admiss

## 2019-12-13 NOTE — CONSULTS
Signed             HPI:   Teresita DislaArturo Coronado presents as a 56-year-old female with a greater than 5 to 6-year history of progressive pain, swelling, stiffness and deformity about the left knee.  Her symptoms are exacerbated by activities and ameliora • METFORMIN  MG Oral Tab TAKE 1 TABLET (500 MG TOTAL) BY MOUTH BEFORE DINNER. 90 tablet 1   • CHLORTHALIDONE 25 MG Oral Tab TAKE 1 TABLET BY MOUTH EVERY DAY 90 tablet 1   • PRAVASTATIN SODIUM 20 MG Oral Tab TAKE ONE TABLET BY MOUTH DAILY IN THE P. M.   Alcohol use: No    Drug use: No         PHYSICAL EXAM:    There were no vitals taken for this visit.   · The patient is alert, oriented and appropriate throughout the examination, in no apparent distress, resting comfortably on the examination table.     Requested Prescriptions                  Signed Prescriptions Disp Refills   • diazepam (VALIUM) 5 MG Oral Tab 3 tablet 0       Sig: Take two 5 mg tabs one hour prior to mri may repeat with one 5 mg tab if needed   Do not drive         Imaging & Referrals: With regards to the total knee arthroplasty, a discussion of the benefits, complications, potential risks included--but was not limited to--the fact that there is at least a 1 to 2% incident of infection and its potential sequelae (including rarely, but st incidence in patients with diabetes/peripheral neuropathy or compressive neuropathy elsewhere); localized paraincisional numbness (particularly laterally); the potential need and risk of a blood transfusion (with increased risk given the use of thromboembo

## 2019-12-13 NOTE — OPERATIVE REPORT
Operative Report     Pre-Operative Diagnosis: Left knee chronic, progressive and advanced osteoarthritis     Post-Operative Diagnosis: Same as above.     Procedure Performed: Procedure(s):  Left total knee arthroplasty     Anesthesia: Spinal Duramorph/gene

## 2019-12-13 NOTE — OPERATIVE REPORT
Operative Report     Pre-Operative Diagnosis: Left knee chronic, progressive and advanced osteoarthritis     Post-Operative Diagnosis: Same as above.     Procedure Performed: Procedure(s):  Left total knee arthroplasty     Anesthesia: Spinal Duramorph/gene anti-inflammatory medication, intraarticular injections and/or previous arthroscopy.  A discussion of the various treatment options were reiterated including continuation of limitation of activity, modification of activity, anti-inflammatory medication, rep polyethylene wear; cardiopulmonary, GI/ and/or cerebral problems including stroke despite medical and anesthesia clearance; anesthetic-related complications despite anesthesia clearance; neurovascular injury including peroneal stretch injury (footdrop) r consent had been obtained, and the patient was assessed and deemed medically stable per Anesthesia, a femoral nerve block was introduced in the PACU.   Patient was brought to the operating room where spinal Duramorph was administered with subsequent general agatha 3 degrees of external rotation. The guide was set at 5 degrees of valgus and 10 mm cut. The distal femoral cut was made.   Next, the size 5 distal femoral cutting block was applied which coincided size-wise very nicely and found to be the appropriate demonstrating excellent stability not only in 0 degrees of extension but 30, 60, and 90 degrees of flexion to varus and valgus stressing.   It was determined, by using the sizing templates, that a size 35 patella would be the appropriate size, and the templ extension but 30degreesof flexion to varus and valgus stressing. Excellent patellofemoral tracking with no–thumb technique with seen as well and excellent flexion extension gap balance noted.   Therefore, the size 20 was selected and secured to the tibial

## 2019-12-13 NOTE — ANESTHESIA PROCEDURE NOTES
Spinal Block  Date/Time: 12/13/2019 7:32 AM  Performed by: Luella Ahumada, MD  Authorized by: Luella Ahumada, MD       General Information and Staff    Start Time:  12/13/2019 7:29 AM  End Time:  12/13/2019 7:32 AM  Anesthesiologist:  Luella Ahumada, MD  Perf

## 2019-12-13 NOTE — PROGRESS NOTES
[facility]    Progress Note    Rosa Peon Patient Status:  Inpatient    1950 MRN [id number]   Location [facility] 4W/SW/SE Attending Agusto Heredia MD   Hosp Day # 0 PCP Kath Swanson MD        Subjective:     Con sounds normal. No respiratory distress. She has no wheezes. She has no rales. She exhibits no tenderness. no palpable masses  Abdominal: Soft. Bowel sounds are normal. She exhibits no distension and no mass. There is no hepatosplenomegaly.  There is no tend

## 2019-12-13 NOTE — ANESTHESIA PREPROCEDURE EVALUATION
Anesthesia PreOp Note    HPI:     Kisha Camacho is a 77year old female who presents for preoperative consultation requested by: Tye Granger MD    Date of Surgery: 12/13/2019    Procedure(s):  KNEE TOTAL REPLACEMENT  Indication: left knee osteoart unilateral         Date Noted: 12/28/2012      Arthritis         Date Noted: 09/29/2009      Diabetes [facility])         Date Noted: 09/29/2009        Past Medical History:   Diagnosis Date   • Arthritis    • Cancer [facility]) 2009    Uterine   • Cancer determined b (VITAMIN B 12) 100 MCG Oral Lozenge, Take 1,000 mg by mouth daily. , Disp: , Rfl: , 12/12/2019 at 2100  Coenzyme Q10 (COQ10) 200 MG Oral Cap, Take 1 tablet by mouth daily. , Disp: , Rfl: , 12/6/2019  Turmeric Curcumin 500 MG Oral Cap, Take 1 tablet by mouth tobacco: Never Used    Substance and Sexual Activity      Alcohol use: No      Drug use: No      Sexual activity: Not on file    Lifestyle      Physical activity:        Days per week: Not on file        Minutes per session: Not on file      Stress: Not on height is 1.575 m (5' 2\") and weight is 109.3 kg (241 lb). Her oral temperature is 98.6 °F (37 °C). Her blood pressure is 148/73 and her pulse is 93. Her respiration is 20 and oxygen saturation is 99%.     12/07/19  1117 12/13/19  0555   BP:  148/73   Puls

## 2019-12-13 NOTE — H&P
HPI:   Leah Hollis:  Tacho Agudelo presents as a 52-year-old female with a greater than 5 to 6-year history of progressive pain, swelling, stiffness and deformity about the left knee.   Her symptoms are exacerbated by activities and ameliorated by rest.  It ha CHLORTHALIDONE 25 MG Oral Tab TAKE 1 TABLET BY MOUTH EVERY DAY 90 tablet 1   • PRAVASTATIN SODIUM 20 MG Oral Tab TAKE ONE TABLET BY MOUTH DAILY IN THE P.M. 90 tablet 1   • DILTIAZEM HCL ER COATED BEADS 240 MG Oral Capsule SR 24 Hr TAKE 1 CAPSULE BY MOUTH E patient is alert, oriented and appropriate throughout the examination, in no apparent distress, resting comfortably on the examination table.      · Pulse and respiratory rate appear normal and regular.      · Lack of physiologic genu valgum posturing pres   Sig: Take two 5 mg tabs one hour prior to mri may repeat with one 5 mg tab if needed   Do not drive         Imaging & Referrals:   XR KNEE ROUTINE (3 VIEWS), LEFT (CPT=73562)  MRI KNEE (W+WO), LEFT (CPT=73723)  XR CHEST PA + LAT CHEST (CPT=71046)  EKG 12 in varus or valgus posturing as it was explained that the alignment is influenced by extramedullary/intramedullary instrumentation with or without PSI instrumentation; periprosthetic fractures (increased incidence in patients with osteopenia) requiring add outweigh the potential risks and is deemed medically stable, we will consider proceeding accordingly.  We discussed the benefits of utilization of patient specific instrumentation and explained its utilization.      Meanwhile home exercises, anti-inflammato

## 2019-12-13 NOTE — ANESTHESIA POSTPROCEDURE EVALUATION
Patient: Mel Silva    Procedure Summary     Date:  12/13/19 Room / Location:  [address] [place] [id number] MAIN OR 12 / [address] [place] [id number] MAIN OR    Anesthesia Start:  3436 Anesthesia Stop:      Procedure:  KNEE TOTAL REPLACEMENT (Left Knee) Diagnosis:  (left knee osteoarthritis)    Surge

## 2019-12-14 NOTE — CM/SW NOTE
SW received MDO to discuss discharge planning with pt. SW met with pt in pt's room. SW confirmed pt's address. Pt lives in a 1 level house with niece. There is/are 4 steps into the home and 3 steps to the bedrooms. Pt states having no hx of HHC or SNF.  Pt

## 2019-12-14 NOTE — PROGRESS NOTES
Karyn Zafar        Wt Readings from Last 6 Encounters:  12/14/19 : 254 lb 3.2 oz (115.3 kg)  12/10/19 : 242 lb (109.8 kg)  12/03/19 : 242 lb (109.8 kg)  11/12/19 : 249 lb (112.9 kg)  11/01/19 : 249 lb (112.9 kg)  06/06/19 : 250 lb (113.4 kg)    69 year [address] [facility] Does not apply Misc, USE TO TEST BLOOD SUGAR EVERY DAY, Disp: 100 each, Rfl: 3  Blood Glucose Monitoring Suppl (ONETOUCH ULTRA 2) w/Device Does not apply Kit, Test daily, Disp: 1 kit, Rfl: 0        No Known Allergies    So Special Diet: Not Asked        Back Care: Not Asked        Exercise: No        Bike Helmet: Not Asked        Seat Belt: Not Asked        Self-Exams: Not Asked    Social History Narrative      Not on file          PHYSICAL EXAM     12/13/19  9676 12/14/

## 2019-12-14 NOTE — PROGRESS NOTES
[facility]    Progress Note    Nadja Mendoza Patient Status:  Inpatient    1950 MRN [id number]   Location [facility] 4W/SW/SE Attending Ada Arnold MD   Hosp Day # 1 PCP Terrance Abrams MD        Subjective:she is f sounds normal. No respiratory distress. She has no wheezes. She has no rales. She exhibits no tenderness. no palpable masses  Abdominal: Soft. Bowel sounds are normal. She exhibits no distension and no mass. There is no hepatosplenomegaly.  There is no tend

## 2019-12-14 NOTE — PLAN OF CARE
Patient's main complaint this evening has been PONV. Zofran, reglan, and compazine given prn - currently states that the nausea is better. Norco and IVP dilaudid for pain management with good results. Remains on 2L oxygen, will wean as appropriate.  Randolph Sin activity based on assessment  - Modify environment to reduce risk of injury  - Provide assistive devices as appropriate  - Consider OT/PT consult to assist with strengthening/mobility  - Encourage toileting schedule  Outcome: Progressing     Problem: [facility] [place]

## 2019-12-14 NOTE — PLAN OF CARE
Problem: PAIN - ADULT  Goal: Verbalizes/displays adequate comfort level or patient's stated pain goal  Description  INTERVENTIONS:  - Encourage pt to monitor pain and request assistance  - Assess pain using appropriate pain scale  - Administer analgesics preferences of patient/family/discharge partner  - Complete POLST form as appropriate  - Assess patient's ability to be responsible for managing their own health  - Refer to Case Management Department for coordinating discharge planning if the patient need

## 2019-12-14 NOTE — PHYSICAL THERAPY NOTE
PHYSICAL THERAPY KNEE EVALUATION - INPATIENT       Room Number: 826/275-L  Evaluation Date: 12/14/2019  Type of Evaluation: Initial  Physician Order: PT Eval and Treat    Presenting Problem: L TKA  Reason for Therapy: Mobility Dysfunction and Discharge Antonio Cont POC. DISCHARGE RECOMMENDATIONS  PT Discharge Recommendations: Sub-acute rehabilitation    PLAN  PT Treatment Plan: Bed mobility; Body mechanics; Coordination; Endurance; Energy conservation;Patient education; Family education;Gait training;Neuromuscula Lower Extremity: Weight Bearing as Tolerated    PAIN ASSESSMENT  Ratin  Location: L knee  Management Techniques: Activity promotion; Body mechanics;Breathing techniques;Relaxation;Repositioning    COGNITION  · Overall Cognitive Status:  WFL - within fun training  Posture  Transfer training    Patient End of Session: Up in chair;Needs met;Call light within reach;RN aware of session/findings; All patient questions and concerns addressed; Ice applied    CURRENT GOALS    Goals to be met by: 12/28/19  Patient Go

## 2019-12-14 NOTE — ANESTHESIA POST-OP FOLLOW-UP NOTE
[facility]   Acute Pain Rounds Note  2019    Patient name: Britt Herrera 77year old female  : 1950  MRN: [id number]    Diagnosis: No diagnosis found.     S/P:    Pain modality: Duramorph    Current hospital day: Hospital Day

## 2019-12-15 PROCEDURE — 99232 SBSQ HOSP IP/OBS MODERATE 35: CPT | Performed by: INTERNAL MEDICINE

## 2019-12-15 RX ORDER — FUROSEMIDE 10 MG/ML
20 INJECTION INTRAMUSCULAR; INTRAVENOUS ONCE
Status: COMPLETED | OUTPATIENT
Start: 2019-12-15 | End: 2019-12-15

## 2019-12-15 NOTE — PROGRESS NOTES
Karyn Zafar        Wt Readings from Last 6 Encounters:  12/14/19 : 254 lb 3.2 oz (115.3 kg)  12/10/19 : 242 lb (109.8 kg)  12/03/19 : 242 lb (109.8 kg)  11/12/19 : 249 lb (112.9 kg)  11/01/19 : 249 lb (112.9 kg)  06/06/19 : 250 lb (113.4 kg)    69 year [address] [facility] Does not apply Misc, USE TO TEST BLOOD SUGAR EVERY DAY, Disp: 100 each, Rfl: 3  Blood Glucose Monitoring Suppl (ONETOUCH ULTRA 2) w/Device Does not apply Kit, Test daily, Disp: 1 kit, Rfl: 0        No Known Allergies    So Special Diet: Not Asked        Back Care: Not Asked        Exercise: No        Bike Helmet: Not Asked        Seat Belt: Not Asked        Self-Exams: Not Asked    Social History Narrative      Not on file          PHYSICAL EXAM     12/14/19  1604 12/14/

## 2019-12-15 NOTE — PROGRESS NOTES
[facility]    Progress Note    Yony Urena Patient Status:  Inpatient    1950 MRN [id number]   Location [facility] 4W/SW/SE Attending Analilia Keen MD   Hosp Day # 2 PCP Alec Blum MD        Subjective:she is f heard.  Pulmonary/Chest: Effort normal and breath sounds normal. No respiratory distress. She has no wheezes. She has no rales. She exhibits no tenderness. no palpable masses  Abdominal: Soft.  Bowel sounds are normal. She exhibits no distension and no mass

## 2019-12-15 NOTE — PLAN OF CARE
Pt is POD #1-2. Vital signs within normal limits. PRN Percocet provided for pain. Alert and oriented x4. CMS intact. Tele #50 in place, NSR. On room air. Tolerating carb controlled diet.  Malone in place, to be removed this AM. Dressing clean, dry, and intac facility with appropriate resources  Description  INTERVENTIONS:  - Identify barriers to discharge w/pt and caregiver  - Include patient/family/discharge partner in discharge planning  - Arrange for needed discharge resources and transportation as appropri

## 2019-12-15 NOTE — PLAN OF CARE
Problem: PAIN - ADULT  Goal: Verbalizes/displays adequate comfort level or patient's stated pain goal  Description  INTERVENTIONS:  - Encourage pt to monitor pain and request assistance  - Assess pain using appropriate pain scale  - Administer analgesics post-discharge preferences of patient/family/discharge partner  - Complete POLST form as appropriate  - Assess patient's ability to be responsible for managing their own health  - Refer to Case Management Department for coordinating discharge planning if t

## 2019-12-15 NOTE — PHYSICAL THERAPY NOTE
PHYSICAL THERAPY KNEE TREATMENT NOTE - INPATIENT     Room Number: 272/498-A             Presenting Problem: L TKA    Problem List  Active Problems:    Primary osteoarthritis of left knee      PHYSICAL THERAPY ASSESSMENT   Pt was received in the bed.  Pt is AM-PAC '6-Clicks' INPATIENT SHORT FORM - BASIC MOBILITY  How much difficulty does the patient currently have. ..  -   Turning over in bed (including adjusting bedclothes, sheets and blankets)?: A Little   -   Sitting down on and standing up from a nida #4   Current Status    Goal #5  AROM 0 degrees extension to 95 degrees flexion     Goal #5   Current Status IN PROGRESS   Goal #6 Patient independently performs home exercise program for ROM/strengthening per the instructions provided in preparation for [employer]

## 2019-12-15 NOTE — OCCUPATIONAL THERAPY NOTE
OCCUPATIONAL THERAPY EVALUATION - INPATIENT      Room Number: 818/618-G  Evaluation Date: 12/15/2019  Type of Evaluation: Initial  Presenting Problem: (OA of L knee)    Physician Order: IP Consult to Occupational Therapy  Reason for Therapy: ADL/IADL Dysfu patients with this level of impairment may benefit from PEEWEE. DISCHARGE RECOMMENDATIONS  OT Discharge Recommendations: Sub-acute rehabilitation  OT Device Recommendations: TBD    PLAN  OT Treatment Plan: Balance activities; Energy conservation/work simpli agreeable to therapy eval and tx session     OCCUPATIONAL THERAPY EXAMINATION     OBJECTIVE  Precautions: Limb alert - left  Fall Risk: Standard fall risk    WEIGHT BEARING RESTRICTION  Weight Bearing Restriction: L lower extremity  L Lower Extremity: Tom session/findings; All patient questions and concerns addressed;SCDs in place; Ice applied    OT Goals  Patient self-stated goal is: does not state      Patient will complete LE dressing with SBA  Comment:     Patient will complete toilet transfer with SBA  C

## 2019-12-16 VITALS
TEMPERATURE: 98 F | RESPIRATION RATE: 18 BRPM | WEIGHT: 254.19 LBS | OXYGEN SATURATION: 95 % | HEART RATE: 80 BPM | HEIGHT: 62 IN | BODY MASS INDEX: 46.78 KG/M2 | SYSTOLIC BLOOD PRESSURE: 111 MMHG | DIASTOLIC BLOOD PRESSURE: 43 MMHG

## 2019-12-16 PROCEDURE — 99232 SBSQ HOSP IP/OBS MODERATE 35: CPT | Performed by: INTERNAL MEDICINE

## 2019-12-16 RX ORDER — POTASSIUM CHLORIDE 20 MEQ/1
40 TABLET, EXTENDED RELEASE ORAL EVERY 4 HOURS
Status: COMPLETED | OUTPATIENT
Start: 2019-12-16 | End: 2019-12-16

## 2019-12-16 RX ORDER — OXYCODONE HYDROCHLORIDE AND ACETAMINOPHEN 5; 325 MG/1; MG/1
1 TABLET ORAL EVERY 4 HOURS PRN
Qty: 50 TABLET | Refills: 0 | Status: SHIPPED | OUTPATIENT
Start: 2019-12-16 | End: 2020-01-03

## 2019-12-16 NOTE — PLAN OF CARE
Patient cleared by medical and ortho teams. Scripts sent in packet.  Ready for discharge to [facility]  Problem: Patient/Family Goals  Goal: Patient/Family Long Term Goal  Description  Patient's Long Term Goal:     Interventions:  -   - See additional Care PLANNING  Goal: Discharge to home or other facility with appropriate resources  Description  INTERVENTIONS:  - Identify barriers to discharge w/pt and caregiver  - Include patient/family/discharge partner in discharge planning  - Arrange for needed dischar

## 2019-12-16 NOTE — PHYSICAL THERAPY NOTE
PHYSICAL THERAPY KNEE TREATMENT NOTE - INPATIENT     Room Number: 108/702-Y             Presenting Problem: L TKA    Problem List  Active Problems:    Primary osteoarthritis of left knee      PHYSICAL THERAPY ASSESSMENT   AM SESSION:   Pt is received in th mechanics; Patient education;Gait training;Range of motion;Strengthening;Transfer training;Balance training    SUBJECTIVE  Pt was agreeable to therapy session.      OBJECTIVE  Precautions: Limb alert - left    WEIGHT BEARING STATUS  Weight Bearing Restrictio Knee ROM                 Patient End of Session: In bed;Needs met;Call light within reach;RN aware of session/findings; All patient questions and concerns addressed    CURRENT GOALS  Goals to be met by: 12/28/19  Patient Goal Patient's self-stated g

## 2019-12-16 NOTE — PLAN OF CARE
Pt is POD #2-3. Vital signs within normal limits. PRN Percocet provided for pain. Alert and oriented x4. CMS intact. Tele #50 in place, NSR. On room air. Tolerating carb controlled diet. Voids freely. Dressing clean, dry, and intact.  Up with one assist and resources  Description  INTERVENTIONS:  - Identify barriers to discharge w/pt and caregiver  - Include patient/family/discharge partner in discharge planning  - Arrange for needed discharge resources and transportation as appropriate  - Identify discharge

## 2019-12-16 NOTE — PROGRESS NOTES
[place] [facility]    Progress Note    Nilda Moscoso Patient Status:  Inpatient    1950 MRN [id number]   Location [facility] 4W/SW/SE Attending Dejon Cardenas MD   Hosp Day # 3 PCP Deloris Magallanes MD        Subjective:she is f heard.  Pulmonary/Chest: Effort normal and breath sounds normal. No respiratory distress. She has no wheezes. She has no rales. She exhibits no tenderness. no palpable masses  Abdominal: Soft.  Bowel sounds are normal. She exhibits no distension and no mass

## 2019-12-16 NOTE — PROGRESS NOTES
[facility]    Progress Note    Katherine Ray Patient Status:  Inpatient    1950 MRN [id number]   Location [facility] 4W/SW/SE Attending Winston Shone, MD   Hosp Day # 3 PCP Vikas Luke MD     Date of Admission:   mg, 650 mg, Oral, Q6H PRN  [] HYDROcodone-acetaminophen (NORCO) 7.5-325 MG per tab 1 tablet, 1 tablet, Oral, Q6H PRN  [] HYDROcodone-acetaminophen (NORCO) 7.5-325 MG per tab 2 tablet, 2 tablet, Oral, Q6H PRN  [] HYDROmorphone HCl (DILA HCl (BENADRYL) injection 12.5 mg, 12.5 mg, Intravenous, Q4H PRN  lactated ringers infusion, , Intravenous, Continuous  apixaban (ELIQUIS) tab 2.5 mg, 2.5 mg, Oral, BID  [COMPLETED] ceFAZolin sodium (ANCEF/KEFZOL) 2 GM/20ML premix IV syringe 2 g, 2 g, Intra leaking from brain    • CARPAL TUNNEL RELEASE Bilateral    • COLONOSCOPY     • COLONOSCOPY     • COLONOSCOPY N/A 6/10/2019    Performed by La Higgins MD at [address] [place] [address] ENDOSCOPY   • HYSTERECTOMY  2009   • SHOULDER ARTHROSCOPY  2010 and 2012    RCR      Soci status post left total knee arthroplasty--doing well        Hemodynamically stable. Incision looking good. Pain well controlled on analgesics.   Continue PT/OT PID  Continue anticoagulation  Okay to transfer to [facility] today from an orthopedic perspect

## 2019-12-16 NOTE — CM/SW NOTE
12/16 1208pm: The pt's insurance has provided approval for transfer to rehab. The pt. Is scheduled to discharge to [facility] today 12/16 at 80p, via [facility]. The pt. Is aware of medicar costs and payment at time of service.     PCS is on the chart for m

## 2019-12-17 ENCOUNTER — EXTERNAL FACILITY (OUTPATIENT)
Dept: INTERNAL MEDICINE CLINIC | Facility: CLINIC | Age: 69
End: 2019-12-17

## 2019-12-17 DIAGNOSIS — E11.00 TYPE 2 DIABETES MELLITUS WITH HYPEROSMOLARITY WITHOUT COMA, WITHOUT LONG-TERM CURRENT USE OF INSULIN (HCC): ICD-10-CM

## 2019-12-17 DIAGNOSIS — G89.29 CHRONIC PAIN OF LEFT KNEE: ICD-10-CM

## 2019-12-17 DIAGNOSIS — M25.562 CHRONIC PAIN OF LEFT KNEE: ICD-10-CM

## 2019-12-17 DIAGNOSIS — I10 ESSENTIAL HYPERTENSION: ICD-10-CM

## 2019-12-17 DIAGNOSIS — M19.90 ARTHRITIS: ICD-10-CM

## 2019-12-17 PROCEDURE — 99306 1ST NF CARE HIGH MDM 50: CPT | Performed by: INTERNAL MEDICINE

## 2019-12-18 ENCOUNTER — SNF ADMIT/H&P (OUTPATIENT)
Dept: INTERNAL MEDICINE CLINIC | Facility: SKILLED NURSING FACILITY | Age: 69
End: 2019-12-18

## 2019-12-18 DIAGNOSIS — I10 ESSENTIAL HYPERTENSION: ICD-10-CM

## 2019-12-18 DIAGNOSIS — R53.1 WEAKNESS: ICD-10-CM

## 2019-12-18 DIAGNOSIS — E11.00 TYPE 2 DIABETES MELLITUS WITH HYPEROSMOLARITY WITHOUT COMA, WITHOUT LONG-TERM CURRENT USE OF INSULIN (HCC): ICD-10-CM

## 2019-12-18 DIAGNOSIS — Z96.652 STATUS POST TOTAL LEFT KNEE REPLACEMENT: ICD-10-CM

## 2019-12-18 PROCEDURE — 1123F ACP DISCUSS/DSCN MKR DOCD: CPT | Performed by: NURSE PRACTITIONER

## 2019-12-18 PROCEDURE — 99309 SBSQ NF CARE MODERATE MDM 30: CPT | Performed by: NURSE PRACTITIONER

## 2019-12-18 NOTE — PROGRESS NOTES
HPI: Teresita Disla  Is a 78 yo with HNT, DM2, OA of L knee who was admitted to [facility] for a L TKA by Dr Celi Sanchez. She had a stable postoperative course and was discharged to [address] for [employer].      Reason for visit: s/p L TKA     Past Medical History:   Janie normal.   RESPIRATORY: CTAB  CARDIOVASCULAR: S1S2 RRR  ABDOMEN:  normal active BS+, soft, non distended, nontender   MUSCULOSKELETAL/EXTREMITIES: LLE 1+edema, no calf pain, RLE no edema   SKIN: warm, dry   WOUND: left knee incision intact staples, no redne

## 2019-12-20 ENCOUNTER — EXTERNAL FACILITY (OUTPATIENT)
Dept: INTERNAL MEDICINE CLINIC | Facility: CLINIC | Age: 69
End: 2019-12-20

## 2019-12-20 DIAGNOSIS — I10 ESSENTIAL HYPERTENSION: ICD-10-CM

## 2019-12-20 DIAGNOSIS — E11.00 TYPE 2 DIABETES MELLITUS WITH HYPEROSMOLARITY WITHOUT COMA, WITHOUT LONG-TERM CURRENT USE OF INSULIN (HCC): ICD-10-CM

## 2019-12-20 DIAGNOSIS — M17.12 PRIMARY OSTEOARTHRITIS OF LEFT KNEE: ICD-10-CM

## 2019-12-20 PROCEDURE — 99308 SBSQ NF CARE LOW MDM 20: CPT | Performed by: INTERNAL MEDICINE

## 2019-12-24 PROBLEM — Z96.652 HISTORY OF TOTAL KNEE ARTHROPLASTY, LEFT: Status: ACTIVE | Noted: 2019-12-24

## 2019-12-27 NOTE — DISCHARGE SUMMARY
DISCHARGE SUMMARY     Tj Rand Patient Status:  Inpatient    1950 MRN [id number]   Location [facility] 4W/SW/SE Attending No att. providers found   Hosp Day # 3 PCP Guillermina Morris MD     Date of Admission: 2019  Date of [address] dressing   Neurological: She is alert and oriented to person, place, and time. Skin: Skin is warm.    Psychiatric: She has a normal mood and affect.        Consultants:  • ortho    Discharge Medication List:     Discharge Medications      START taking the Refills:  0     Vitamin B 12 100 MCG Lozg      Take 1,000 mg by mouth daily. Refills:  0     VITAMIN D OR      Take 5,000 Units by mouth daily.    Refills:  0        STOP taking these medications    ONETOUCH ULTRA BLUE Strp              Where to Get Your apply Misc  USE TO TEST BLOOD SUGAR EVERY DAY, Normal, Disp-100 each, R-3    Blood Glucose Monitoring Suppl (ONETOUCH ULTRA 2) w/Device Does not apply Kit  Test daily, Normal, Disp-1 kit, R-0              Discharge Diet: Diabetic diet    Discharge Activity

## 2019-12-30 RX ORDER — PRAVASTATIN SODIUM 20 MG
TABLET ORAL
Qty: 90 TABLET | Refills: 1 | Status: SHIPPED | OUTPATIENT
Start: 2019-12-30 | End: 2020-06-29

## 2019-12-31 NOTE — TELEPHONE ENCOUNTER
Refill passed per [facility] protocol.     Requested Prescriptions   Pending Prescriptions Disp Refills   • PRAVASTATIN SODIUM 20 MG Oral Tab [Pharmacy Med Name: PRAVASTATIN SODIUM 20 MG TAB] 90 tablet 1     Sig: TAKE ONE TABLET BY MOUTH DAILY IN THE P

## 2020-01-03 ENCOUNTER — OFFICE VISIT (OUTPATIENT)
Dept: INTERNAL MEDICINE CLINIC | Facility: CLINIC | Age: 70
End: 2020-01-03
Payer: COMMERCIAL

## 2020-01-03 VITALS
TEMPERATURE: 98 F | DIASTOLIC BLOOD PRESSURE: 77 MMHG | WEIGHT: 237 LBS | SYSTOLIC BLOOD PRESSURE: 123 MMHG | RESPIRATION RATE: 18 BRPM | HEART RATE: 87 BPM | HEIGHT: 62 IN | BODY MASS INDEX: 43.61 KG/M2

## 2020-01-03 DIAGNOSIS — I72.9 ANEURYSM (HCC): ICD-10-CM

## 2020-01-03 DIAGNOSIS — E11.8 TYPE 2 DIABETES MELLITUS WITH COMPLICATION, WITHOUT LONG-TERM CURRENT USE OF INSULIN (HCC): ICD-10-CM

## 2020-01-03 DIAGNOSIS — Z96.652 S/P TOTAL KNEE ARTHROPLASTY, LEFT: Primary | ICD-10-CM

## 2020-01-03 PROCEDURE — 99214 OFFICE O/P EST MOD 30 MIN: CPT | Performed by: INTERNAL MEDICINE

## 2020-01-03 NOTE — PROGRESS NOTES
HPI:    Patient ID: Butch Green is a 77year old female. HPI She is here today for follow up after she was discharged from rehab .   She did have left knee arthroplasty done at [facility].  According to her she was seen by ortho keishaer she was d headaches. Hematological: Negative for adenopathy. Does not bruise/bleed easily. Psychiatric/Behavioral: Negative for agitation, behavioral problems, confusion, hallucinations and sleep disturbance. The patient is not nervous/anxious.           Current Past Surgical History:   Procedure Laterality Date   • BRAIN SURGERY  2012    fluid leaking from brain    • CARPAL TUNNEL RELEASE Bilateral    • COLONOSCOPY     • COLONOSCOPY     • COLONOSCOPY N/A 6/10/2019    Performed by Julia Patel MD at [address] [place] [address] eye exhibits no discharge. No scleral icterus. Neck: Normal range of motion. Neck supple. No JVD present. No tracheal tenderness present. No tracheal deviation present. No thyroid mass and no thyromegaly present.    Cardiovascular: Normal rate, regular rh prescriptions requested or ordered in this encounter       Imaging & Referrals:  None        #[id number]

## 2020-01-03 NOTE — PATIENT INSTRUCTIONS
Status post left knee arthroplasty pain is controlled continue with physical therapy fall precautions.     Hypertension controlled advised to continue with current medication check blood pressure at home and bring logbook next visit watch diet avoid salty f

## 2020-01-27 RX ORDER — APIXABAN 2.5 MG/1
TABLET, FILM COATED ORAL
Qty: 40 TABLET | Refills: 0 | OUTPATIENT
Start: 2020-01-27

## 2020-02-03 ENCOUNTER — MA CHART PREP (OUTPATIENT)
Dept: FAMILY MEDICINE CLINIC | Facility: CLINIC | Age: 70
End: 2020-02-03

## 2020-02-03 PROBLEM — I77.1 TORTUOUS AORTA (HCC): Status: ACTIVE | Noted: 2020-02-03

## 2020-02-03 PROBLEM — I77.1 TORTUOUS AORTA: Status: ACTIVE | Noted: 2020-02-03

## 2020-02-11 ENCOUNTER — OFFICE VISIT (OUTPATIENT)
Dept: INTERNAL MEDICINE CLINIC | Facility: CLINIC | Age: 70
End: 2020-02-11
Payer: COMMERCIAL

## 2020-02-11 VITALS
HEART RATE: 78 BPM | DIASTOLIC BLOOD PRESSURE: 83 MMHG | RESPIRATION RATE: 18 BRPM | BODY MASS INDEX: 42.88 KG/M2 | WEIGHT: 233 LBS | HEIGHT: 62 IN | SYSTOLIC BLOOD PRESSURE: 127 MMHG | TEMPERATURE: 98 F

## 2020-02-11 DIAGNOSIS — Z28.21 IMMUNIZATION NOT CARRIED OUT BECAUSE OF PATIENT REFUSAL: ICD-10-CM

## 2020-02-11 DIAGNOSIS — Z00.00 ENCOUNTER FOR ANNUAL HEALTH EXAMINATION: ICD-10-CM

## 2020-02-11 PROBLEM — M65.331 TRIGGER MIDDLE FINGER OF RIGHT HAND: Status: RESOLVED | Noted: 2017-07-31 | Resolved: 2020-02-11

## 2020-02-11 PROBLEM — M25.562 CHRONIC PAIN OF LEFT KNEE: Status: RESOLVED | Noted: 2017-01-29 | Resolved: 2020-02-11

## 2020-02-11 PROBLEM — G89.29 CHRONIC PAIN OF LEFT KNEE: Status: RESOLVED | Noted: 2017-01-29 | Resolved: 2020-02-11

## 2020-02-11 PROBLEM — M17.0 PRIMARY OSTEOARTHRITIS OF KNEES, BILATERAL: Status: RESOLVED | Noted: 2017-02-23 | Resolved: 2020-02-11

## 2020-02-11 PROBLEM — M17.12 PRIMARY OSTEOARTHRITIS OF LEFT KNEE: Status: RESOLVED | Noted: 2018-09-11 | Resolved: 2020-02-11

## 2020-02-11 PROCEDURE — 96160 PT-FOCUSED HLTH RISK ASSMT: CPT | Performed by: INTERNAL MEDICINE

## 2020-02-11 PROCEDURE — G0439 PPPS, SUBSEQ VISIT: HCPCS | Performed by: INTERNAL MEDICINE

## 2020-02-11 PROCEDURE — 99397 PER PM REEVAL EST PAT 65+ YR: CPT | Performed by: INTERNAL MEDICINE

## 2020-02-11 NOTE — PROGRESS NOTES
HPI:   Yony Urena is a 77year old female who presents for a Medicare Subsequent Annual Wellness visit (Pt already had Initial Annual Wellness).       Annual Physical due on 02/19/2020        Fall/Risk Assessment   She has been screened for Falls and i List:     Spondylolysis, lumbar region     Essential hypertension     DM type 2 (diabetes mellitus, type 2) ([facility]     Obesity, Class III, BMI 40-49.9 (morbid obesity) ([facility])     Dyslipidemia     Chronic pain of left knee     Primary osteoarthritis of knees, pressure, High cholesterol, Incontinence, Osteoarthritis, Sleep apnea, and Visual impairment. She  has a past surgical history that includes hysterectomy (2009); shoulder arthroscopy (2010 and 2012);  Brain Surgery (2012); colonoscopy; colonoscopy (N/A, talking at the same time:  No   I have trouble understanding things on the TV:  No I have to strain to understand conversations:  No   I have to worry about missing the telephone ring or doorbell:  No I have trouble hearing conversations in a noisy backgro 2+ on the right side and 2+ on the left side. Popliteal pulses are 2+ on the right side and 2+ on the left side. Dorsalis pedis pulses are 2+ on the right side and 2+ on the left side.         Posterior tibial pulses are 2+ on the right side an Dyslipidemia-continue with current medication watch diet avoid fried food red meat more fruits and vegetables will check a lipid panel next visit   chronic pain of left knee          Trigger middle finger of right hand-improved     Osteoarthritis of finger Cancer Screening      Colonoscopy Screen every 10 years Colonoscopy due on 06/10/2024 Update [facility] if applicable    Flex Sigmoidoscopy Screen every 10 years No results found for this or any previous visit. No flowsheet data found.      Fecal Oc prescription benefits, but Medicare does not cover unless Medically needed    Zoster   Not covered by Medicare Part B No vaccine history found This may be covered with your pharmacy  prescription benefits      [facility] Internal Lab or Pr

## 2020-02-11 NOTE — PATIENT INSTRUCTIONS
Ziyad Israel's SCREENING SCHEDULE   Tests on this list are recommended by your physician but may not be covered, or covered at this frequency, by your insurer. Please check with your insurance carrier before scheduling to verify coverage.    PREVENTATI every 10 years- more often if abnormal Colonoscopy due on 06/10/2024 Update [facility] if applicable    Flex Sigmoidoscopy Screen  Covered every 5 years No results found for this or any previous visit. No flowsheet data found.      Fecal Occult Bloo (Pneumovax)  Covered Once after 65 No orders found for this or any previous visit. Please get once after your 65th birthday    Hepatitis B for Moderate/High Risk       No orders found for this or any previous visit.  Medium/high risk factors:   End-stage re

## 2020-03-30 RX ORDER — CHLORTHALIDONE 25 MG/1
TABLET ORAL
Qty: 90 TABLET | Refills: 1 | Status: SHIPPED | OUTPATIENT
Start: 2020-03-30 | End: 2020-11-02

## 2020-04-11 RX ORDER — DILTIAZEM HYDROCHLORIDE 240 MG/1
CAPSULE, COATED, EXTENDED RELEASE ORAL
Qty: 90 CAPSULE | Refills: 1 | Status: SHIPPED | OUTPATIENT
Start: 2020-04-11 | End: 2020-07-23

## 2020-05-14 RX ORDER — ALENDRONATE SODIUM 70 MG/1
TABLET ORAL
Qty: 12 TABLET | Refills: 1 | Status: SHIPPED | OUTPATIENT
Start: 2020-05-14 | End: 2020-10-22

## 2020-06-01 ENCOUNTER — TELEPHONE (OUTPATIENT)
Dept: INTERNAL MEDICINE CLINIC | Facility: CLINIC | Age: 70
End: 2020-06-01

## 2020-06-01 NOTE — TELEPHONE ENCOUNTER
Patient heard today that Metformin in being recalled. Patient asking for Kayode Martinez to be prescribed instead.     Please advise-

## 2020-06-01 NOTE — TELEPHONE ENCOUNTER
Advised patient of Dr Issa Carrion note. Patient verbalized understanding and had no further questions.

## 2020-06-01 NOTE — TELEPHONE ENCOUNTER
Not all metformin  are on recall, only extended release , please let her know she will need to check with her pharmacy, if there is any recall they will tell he r

## 2020-06-03 RX ORDER — QUINAPRIL 20 MG/1
TABLET ORAL
Qty: 90 TABLET | Refills: 1 | Status: SHIPPED | OUTPATIENT
Start: 2020-06-03 | End: 2021-01-21

## 2020-06-08 ENCOUNTER — OFFICE VISIT (OUTPATIENT)
Dept: PODIATRY CLINIC | Facility: CLINIC | Age: 70
End: 2020-06-08
Payer: COMMERCIAL

## 2020-06-08 DIAGNOSIS — E11.8 TYPE 2 DIABETES MELLITUS WITH COMPLICATION, WITHOUT LONG-TERM CURRENT USE OF INSULIN (HCC): Primary | ICD-10-CM

## 2020-06-08 PROCEDURE — 99213 OFFICE O/P EST LOW 20 MIN: CPT | Performed by: PODIATRIST

## 2020-06-08 NOTE — PROGRESS NOTES
Sherry Carroll is a 72year old female.  Patient presents with:  Diabetic Foot Care: 1 year f/u - last XcT9G=5.8 from 11/30/19 and LOV with PCP Dr Don Ram was 4/30/2020 - has no c/o regarding her feet - this AM she did not checked her BS         HPI:   Seema High blood pressure    • High cholesterol    • Incontinence     bladder   • Osteoarthritis    • Sleep apnea     no CPAP use   • Visual impairment     readers      Past Surgical History:   Procedure Laterality Date   • BRAIN SURGERY  2012    fluid leaking f normal thickness I could not assess coloration because they are painted mostly. There are no sores ulcerations or interdigital macerations noted at this visit. There are no lesions on the foot no hyperkeratoses. 2. Vascular:  The patient has palpable do

## 2020-06-29 RX ORDER — PRAVASTATIN SODIUM 20 MG
TABLET ORAL
Qty: 90 TABLET | Refills: 1 | Status: SHIPPED | OUTPATIENT
Start: 2020-06-29 | End: 2021-01-11

## 2020-07-04 RX ORDER — DILTIAZEM HYDROCHLORIDE 240 MG/1
CAPSULE, COATED, EXTENDED RELEASE ORAL
Qty: 90 CAPSULE | Refills: 1 | OUTPATIENT
Start: 2020-07-04

## 2020-07-04 NOTE — TELEPHONE ENCOUNTER
Per chart review the patient should have a refill available at the pharmacy for DILTIAZEM HCL ER COATED BEADS 240 MG Oral Capsule SR 24 Hr. Augmenix message sent to the patient.

## 2020-07-23 ENCOUNTER — TELEPHONE (OUTPATIENT)
Dept: CASE MANAGEMENT | Age: 70
End: 2020-07-23

## 2020-07-23 RX ORDER — DILTIAZEM HYDROCHLORIDE 240 MG/1
240 CAPSULE, COATED, EXTENDED RELEASE ORAL DAILY
Qty: 90 CAPSULE | Refills: 1 | Status: SHIPPED | OUTPATIENT
Start: 2020-07-23 | End: 2021-01-14

## 2020-07-23 NOTE — TELEPHONE ENCOUNTER
Hello,    Patient called [address] Department and is requesting order for her mammogram screening and bone density exam.     Kind Regards,    Nadyne Closs Varela-Referral Specialist

## 2020-08-19 ENCOUNTER — HOSPITAL ENCOUNTER (OUTPATIENT)
Dept: BONE DENSITY | Facility: HOSPITAL | Age: 70
Discharge: HOME | End: 2020-08-19
Attending: INTERNAL MEDICINE
Payer: MEDICARE

## 2020-08-19 ENCOUNTER — HOSPITAL ENCOUNTER (OUTPATIENT)
Dept: MAMMOGRAPHY | Facility: HOSPITAL | Age: 70
Discharge: HOME | End: 2020-08-19
Attending: INTERNAL MEDICINE
Payer: MEDICARE

## 2020-08-19 DIAGNOSIS — Z78.0 MENOPAUSE: ICD-10-CM

## 2020-08-19 DIAGNOSIS — Z12.31 ENCOUNTER FOR SCREENING MAMMOGRAM FOR BREAST CANCER: ICD-10-CM

## 2020-08-19 PROCEDURE — 77067 SCR MAMMO BI INCL CAD: CPT | Performed by: INTERNAL MEDICINE

## 2020-08-19 PROCEDURE — 77063 BREAST TOMOSYNTHESIS BI: CPT | Performed by: INTERNAL MEDICINE

## 2020-08-19 PROCEDURE — 77080 DXA BONE DENSITY AXIAL: CPT | Performed by: INTERNAL MEDICINE

## 2020-08-21 ENCOUNTER — OFFICE VISIT (OUTPATIENT)
Dept: INTERNAL MEDICINE CLINIC | Facility: CLINIC | Age: 70
End: 2020-08-21
Payer: COMMERCIAL

## 2020-08-21 VITALS
SYSTOLIC BLOOD PRESSURE: 131 MMHG | HEIGHT: 62 IN | HEART RATE: 81 BPM | DIASTOLIC BLOOD PRESSURE: 78 MMHG | BODY MASS INDEX: 43.98 KG/M2 | WEIGHT: 239 LBS | TEMPERATURE: 97 F | OXYGEN SATURATION: 99 %

## 2020-08-21 DIAGNOSIS — E11.00 TYPE 2 DIABETES MELLITUS WITH HYPEROSMOLARITY WITHOUT COMA, WITHOUT LONG-TERM CURRENT USE OF INSULIN (HCC): Primary | ICD-10-CM

## 2020-08-21 DIAGNOSIS — E78.00 HYPERCHOLESTEREMIA: ICD-10-CM

## 2020-08-21 DIAGNOSIS — G45.9 TIA (TRANSIENT ISCHEMIC ATTACK): ICD-10-CM

## 2020-08-21 LAB
CHOLEST SMN-MCNC: 169 MG/DL (ref ?–200)
EST. AVERAGE GLUCOSE BLD GHB EST-MCNC: 126 MG/DL (ref 68–126)
HBA1C MFR BLD HPLC: 6 % (ref ?–5.7)
HDLC SERPL-MCNC: 59 MG/DL (ref 40–59)
LDLC SERPL CALC-MCNC: 96 MG/DL (ref ?–100)
NONHDLC SERPL-MCNC: 110 MG/DL (ref ?–130)
PATIENT FASTING Y/N/NP: YES
TRIGL SERPL-MCNC: 70 MG/DL (ref 30–149)
VLDLC SERPL CALC-MCNC: 14 MG/DL (ref 0–30)

## 2020-08-21 PROCEDURE — 3078F DIAST BP <80 MM HG: CPT | Performed by: INTERNAL MEDICINE

## 2020-08-21 PROCEDURE — 3075F SYST BP GE 130 - 139MM HG: CPT | Performed by: INTERNAL MEDICINE

## 2020-08-21 PROCEDURE — 99214 OFFICE O/P EST MOD 30 MIN: CPT | Performed by: INTERNAL MEDICINE

## 2020-08-21 PROCEDURE — 3008F BODY MASS INDEX DOCD: CPT | Performed by: INTERNAL MEDICINE

## 2020-08-21 PROCEDURE — 36415 COLL VENOUS BLD VENIPUNCTURE: CPT | Performed by: INTERNAL MEDICINE

## 2020-08-21 NOTE — PROGRESS NOTES
HPI:    Patient ID: Kayla Park is a 72year old female. HPI  She is here today for follow-up fall. She states that she is checking her blood sugar at home fasting sugar is running between 80 and 110. She is taking her medications regularly.   She dizziness, tremors, speech difficulty, weakness, light-headedness, numbness and headaches. Hematological: Negative for adenopathy. Does not bruise/bleed easily.    Psychiatric/Behavioral: Negative for agitation, behavioral problems, confusion, hallucinati 2012    fluid leaking from brain    • [address] Bilateral    • COLONOSCOPY     • COLONOSCOPY     • COLONOSCOPY N/A 6/10/2019    Performed by Myron Womack MD at [address] [place] [address] ENDOSCOPY   • HYSTERECTOMY  2009   • KNEE SURGERY Left 12/13/2019    left t motion. Neck supple. No JVD present. No tracheal tenderness present. No tracheal deviation present. No thyroid mass and no thyromegaly present. Cardiovascular: Normal rate, regular rhythm, normal heart sounds and intact distal pulses.  Exam reveals no gal Lipid Panel [E]      Hemoglobin A1C [E]      Meds This Visit:  Requested Prescriptions      No prescriptions requested or ordered in this encounter       Imaging & Referrals:  None        #[id number]

## 2020-09-09 ENCOUNTER — HOSPITAL ENCOUNTER (OUTPATIENT)
Dept: CV DIAGNOSTICS | Facility: HOSPITAL | Age: 70
Discharge: HOME | End: 2020-09-09
Attending: INTERNAL MEDICINE
Payer: MEDICARE

## 2020-09-09 DIAGNOSIS — G45.9 TIA (TRANSIENT ISCHEMIC ATTACK): ICD-10-CM

## 2020-09-09 PROCEDURE — 93306 TTE W/DOPPLER COMPLETE: CPT | Performed by: INTERNAL MEDICINE

## 2020-10-22 RX ORDER — ALENDRONATE SODIUM 70 MG/1
TABLET ORAL
Qty: 12 TABLET | Refills: 1 | Status: SHIPPED | OUTPATIENT
Start: 2020-10-22 | End: 2021-04-09

## 2020-11-02 RX ORDER — CHLORTHALIDONE 25 MG/1
TABLET ORAL
Qty: 90 TABLET | Refills: 1 | Status: SHIPPED | OUTPATIENT
Start: 2020-11-02 | End: 2021-01-14

## 2020-11-07 ENCOUNTER — HOSPITAL ENCOUNTER (EMERGENCY)
Facility: HOSPITAL | Age: 70
Discharge: HOME | End: 2020-11-07
Attending: EMERGENCY MEDICINE
Payer: MEDICARE

## 2020-11-07 ENCOUNTER — APPOINTMENT (OUTPATIENT)
Dept: CT IMAGING | Facility: HOSPITAL | Age: 70
End: 2020-11-07
Attending: EMERGENCY MEDICINE
Payer: MEDICARE

## 2020-11-07 ENCOUNTER — TELEPHONE (OUTPATIENT)
Dept: INTERNAL MEDICINE CLINIC | Facility: CLINIC | Age: 70
End: 2020-11-07

## 2020-11-07 VITALS
BODY MASS INDEX: 44 KG/M2 | OXYGEN SATURATION: 99 % | TEMPERATURE: 99 F | RESPIRATION RATE: 18 BRPM | DIASTOLIC BLOOD PRESSURE: 80 MMHG | SYSTOLIC BLOOD PRESSURE: 130 MMHG | HEART RATE: 99 BPM | WEIGHT: 242 LBS

## 2020-11-07 DIAGNOSIS — R29.898 RIGHT ARM WEAKNESS: Primary | ICD-10-CM

## 2020-11-07 PROCEDURE — 70450 CT HEAD/BRAIN W/O DYE: CPT | Performed by: EMERGENCY MEDICINE

## 2020-11-07 PROCEDURE — 99284 EMERGENCY DEPT VISIT MOD MDM: CPT

## 2020-11-08 NOTE — ED INITIAL ASSESSMENT (HPI)
Patient notes right arm heaviness/weakness since august. Patient notes worse this week. Denies numbness/tingling. Patient notes trying to lift something today and dropped it. Patient notes in august diagnosed with TIA.   Patient is alert x4, patient ambul

## 2020-11-08 NOTE — ED PROVIDER NOTES
Patient Seen in: [facility] Emergency Department    History   Patient presents with:  Weakness      HPI    Patient presents to the ED complaining of intermittent weakness to her right arm for the past several months.   She states that weakness come Smoking status: Former Smoker        Packs/day: 0.00        Years: 40.00        Pack years: 0      Smokeless tobacco: Never Used    Substance and Sexual Activity      Alcohol use: No      Drug use: No    Other Topics      Concerns:        Caffeine Concern: sensory deficit. Coordination normal. GCS eye subscore is 4. GCS verbal subscore is 5. GCS motor subscore is 6. Normal strength in the right arm on exam.   Skin: Skin is warm and dry. Psychiatric: She has a normal mood and affect.  Her behavior is [address]  [address] [address]  [phone]    Schedule an appointment as soon as possible for a visit in 3 days        Medications Prescribed:  Discharge Medication List as of 11/7/2020 11:06 PM

## 2020-11-10 NOTE — TELEPHONE ENCOUNTER
Message # [id number]         11/07/2020 07:23p   [BONITAR]  To:  Abbi Mccarthy  From:  Radha Goodson MD:  Phone#:  [phone]  ----------------------------------------------------------------------  RE WEAKNESS IN RIGHT HAND CAN'T HOLD ANY THING STA

## 2020-11-19 ENCOUNTER — OFFICE VISIT (OUTPATIENT)
Dept: INTERNAL MEDICINE CLINIC | Facility: CLINIC | Age: 70
End: 2020-11-19
Payer: COMMERCIAL

## 2020-11-19 VITALS
BODY MASS INDEX: 44 KG/M2 | DIASTOLIC BLOOD PRESSURE: 65 MMHG | WEIGHT: 241 LBS | TEMPERATURE: 98 F | HEART RATE: 72 BPM | SYSTOLIC BLOOD PRESSURE: 120 MMHG | OXYGEN SATURATION: 97 %

## 2020-11-19 DIAGNOSIS — G45.9 TIA (TRANSIENT ISCHEMIC ATTACK): Primary | ICD-10-CM

## 2020-11-19 PROCEDURE — 3078F DIAST BP <80 MM HG: CPT | Performed by: INTERNAL MEDICINE

## 2020-11-19 PROCEDURE — 99214 OFFICE O/P EST MOD 30 MIN: CPT | Performed by: INTERNAL MEDICINE

## 2020-11-19 PROCEDURE — 3074F SYST BP LT 130 MM HG: CPT | Performed by: INTERNAL MEDICINE

## 2020-11-19 NOTE — PROGRESS NOTES
HPI:    Patient ID: Yulia Noel is a 72year old female. HPI  She came in today for follow-up from emergency room.  She went to emergency room because of the right-sided weakness she states that they did a CT of the head and was told everything is ok agitation, behavioral problems, confusion, hallucinations and sleep disturbance. The patient is not nervous/anxious.           Current Outpatient Medications   Medication Sig Dispense Refill   • CHLORTHALIDONE 25 MG Oral Tab TAKE 1 TABLET BY MOUTH EVERY DAY HYSTERECTOMY  2009   • KNEE SURGERY Left 12/13/2019    left total knee arthroplasty   • KNEE TOTAL REPLACEMENT Left 12/13/2019    Performed by Ada Aronld MD at [address]   • SHOULDER ARTHROSCOPY  2010 and 2012    RCR      Family History   Problem heart sounds and intact distal pulses. Exam reveals no gallop and no friction rub. No murmur heard. Pulmonary/Chest: Effort normal and breath sounds normal. No accessory muscle usage. No respiratory distress. She has no wheezes. She has no rales.  She ex [facility] ([id number])        [id number]

## 2020-11-20 ENCOUNTER — TELEPHONE (OUTPATIENT)
Dept: INTERNAL MEDICINE CLINIC | Facility: CLINIC | Age: 70
End: 2020-11-20

## 2020-12-07 RX ORDER — BLOOD SUGAR DIAGNOSTIC
1 STRIP MISCELLANEOUS DAILY
Qty: 100 EACH | Refills: 1 | Status: SHIPPED | OUTPATIENT
Start: 2020-12-07 | End: 2022-01-13

## 2021-01-06 ENCOUNTER — TELEPHONE (OUTPATIENT)
Dept: INTERNAL MEDICINE CLINIC | Facility: CLINIC | Age: 71
End: 2021-01-06

## 2021-01-06 NOTE — TELEPHONE ENCOUNTER
Patient was released from hospital on yesterday, 1/5. She states that she had a stroke on 12/22/20. A stent was put in neck on 1/4/2021. Please give me a call back [phone]. Patient of Dr. Florence White.

## 2021-01-06 NOTE — TELEPHONE ENCOUNTER
I spoke with her advised her to continue with same medication, monitor bp, if nay new symptoms go to er , she will follow up next week

## 2021-01-11 RX ORDER — PRAVASTATIN SODIUM 20 MG
TABLET ORAL
Qty: 90 TABLET | Refills: 1 | Status: SHIPPED | OUTPATIENT
Start: 2021-01-11 | End: 2021-07-18

## 2021-01-14 ENCOUNTER — OFFICE VISIT (OUTPATIENT)
Dept: INTERNAL MEDICINE CLINIC | Facility: CLINIC | Age: 71
End: 2021-01-14
Payer: COMMERCIAL

## 2021-01-14 VITALS
WEIGHT: 238 LBS | OXYGEN SATURATION: 98 % | DIASTOLIC BLOOD PRESSURE: 77 MMHG | HEART RATE: 80 BPM | HEIGHT: 62 IN | BODY MASS INDEX: 43.79 KG/M2 | SYSTOLIC BLOOD PRESSURE: 126 MMHG

## 2021-01-14 DIAGNOSIS — I63.9 ACUTE ISCHEMIC STROKE (HCC): Primary | ICD-10-CM

## 2021-01-14 DIAGNOSIS — I77.1 STRICTURE OF ARTERY (HCC): ICD-10-CM

## 2021-01-14 DIAGNOSIS — E11.8 TYPE 2 DIABETES MELLITUS WITH COMPLICATION, WITHOUT LONG-TERM CURRENT USE OF INSULIN (HCC): ICD-10-CM

## 2021-01-14 PROBLEM — I63.512 ACUTE ISCHEMIC LEFT MCA STROKE (HCC): Status: ACTIVE | Noted: 2021-01-14

## 2021-01-14 PROBLEM — I65.22 STENOSIS OF LEFT CAROTID ARTERY: Status: ACTIVE | Noted: 2021-01-14

## 2021-01-14 PROBLEM — I63.312 CEREBRAL INFARCTION DUE TO THROMBOSIS OF LEFT MIDDLE CEREBRAL ARTERY (HCC): Status: ACTIVE | Noted: 2021-01-14

## 2021-01-14 PROCEDURE — 3074F SYST BP LT 130 MM HG: CPT | Performed by: INTERNAL MEDICINE

## 2021-01-14 PROCEDURE — 99215 OFFICE O/P EST HI 40 MIN: CPT | Performed by: INTERNAL MEDICINE

## 2021-01-14 PROCEDURE — 1111F DSCHRG MED/CURRENT MED MERGE: CPT | Performed by: INTERNAL MEDICINE

## 2021-01-14 PROCEDURE — 3078F DIAST BP <80 MM HG: CPT | Performed by: INTERNAL MEDICINE

## 2021-01-14 PROCEDURE — 3008F BODY MASS INDEX DOCD: CPT | Performed by: INTERNAL MEDICINE

## 2021-01-14 RX ORDER — CLOPIDOGREL BISULFATE 75 MG/1
75 TABLET ORAL DAILY
COMMUNITY
Start: 2021-01-06

## 2021-01-14 RX ORDER — ASPIRIN 81 MG/1
81 TABLET ORAL DAILY
COMMUNITY

## 2021-01-14 NOTE — PROGRESS NOTES
HPI:    Patient ID: Katherine Jorgensen is a 72year old female.     HPI P      Natali Cardenase 71 y/o PMHx significant for MINE, HTN, HLD, DM2, and osteoarthritis who is admitted to the hospital for acute onset aphasia with findings of acute left MCA ischemic str tremors, speech difficulty, weakness, light-headedness, numbness and headaches. Hematological: Negative for adenopathy. Does not bruise/bleed easily.    Psychiatric/Behavioral: Negative for agitation, behavioral problems, confusion, hallucinations and sle brain    • CARPAL TUNNEL RELEASE Bilateral 2000   • COLONOSCOPY     • COLONOSCOPY     • COLONOSCOPY N/A 6/10/2019    Performed by Fernanda John MD at [facility] ENDOSCOPY   • HYSTERECTOMY  2009   • KNEE SURGERY Left 12/13/2019    left total knee arthroplasty JVD present. No tracheal tenderness present. No tracheal deviation present. No thyroid mass and no thyromegaly present. Cardiovascular: Normal rate, regular rhythm, normal heart sounds and intact distal pulses. Exam reveals no gallop and no friction rub. Imaging & Referrals:  None        [place]#[id number]

## 2021-01-21 ENCOUNTER — TELEPHONE (OUTPATIENT)
Dept: INTERNAL MEDICINE CLINIC | Facility: CLINIC | Age: 71
End: 2021-01-21

## 2021-01-21 RX ORDER — QUINAPRIL 20 MG/1
20 TABLET ORAL 2 TIMES DAILY
Qty: 180 TABLET | Refills: 0 | Status: CANCELLED | OUTPATIENT
Start: 2021-01-21

## 2021-01-21 RX ORDER — QUINAPRIL 20 MG/1
40 TABLET ORAL 2 TIMES DAILY
Qty: 360 TABLET | Refills: 1 | Status: CANCELLED | OUTPATIENT
Start: 2021-01-21

## 2021-01-21 NOTE — TELEPHONE ENCOUNTER
Patient had appt for HFU on 1/14/21. Reports since then has been monitoring BP and has been elevated today /81, 154/?, reports has been around this range, denied any symptoms.  While she was hospitalized her Quinapril was increased to 40mg twice daily,

## 2021-01-21 NOTE — TELEPHONE ENCOUNTER
Verified name and . Patient was advised of Dr. Denise Livingston message as seen in previous charting note. Patient verbalizes understanding and agrees with plan. She is requesting a new prescription for the Quinapril Hcl 20 mg BID.     Medication pended for you

## 2021-01-21 NOTE — TELEPHONE ENCOUNTER
She can not take lisinopril 40 mg bid , she can continue with lisinopril 20 mg bid , I will add amlodipine 10 mg daily

## 2021-01-25 RX ORDER — QUINAPRIL 20 MG/1
TABLET ORAL
Qty: 90 TABLET | Refills: 1 | Status: SHIPPED | OUTPATIENT
Start: 2021-01-25 | End: 2021-09-08

## 2021-02-01 DIAGNOSIS — Z23 NEED FOR VACCINATION: ICD-10-CM

## 2021-02-25 ENCOUNTER — OFFICE VISIT (OUTPATIENT)
Dept: INTERNAL MEDICINE CLINIC | Facility: CLINIC | Age: 71
End: 2021-02-25
Payer: COMMERCIAL

## 2021-02-25 VITALS
DIASTOLIC BLOOD PRESSURE: 73 MMHG | BODY MASS INDEX: 43.06 KG/M2 | OXYGEN SATURATION: 98 % | HEART RATE: 73 BPM | HEIGHT: 62 IN | SYSTOLIC BLOOD PRESSURE: 118 MMHG | WEIGHT: 234 LBS

## 2021-02-25 DIAGNOSIS — Z00.00 ANNUAL PHYSICAL EXAM: Primary | ICD-10-CM

## 2021-02-25 DIAGNOSIS — Z00.00 ENCOUNTER FOR ANNUAL HEALTH EXAMINATION: ICD-10-CM

## 2021-02-25 PROCEDURE — G0439 PPPS, SUBSEQ VISIT: HCPCS | Performed by: INTERNAL MEDICINE

## 2021-02-25 PROCEDURE — 96160 PT-FOCUSED HLTH RISK ASSMT: CPT | Performed by: INTERNAL MEDICINE

## 2021-02-25 PROCEDURE — 3008F BODY MASS INDEX DOCD: CPT | Performed by: INTERNAL MEDICINE

## 2021-02-25 PROCEDURE — 3074F SYST BP LT 130 MM HG: CPT | Performed by: INTERNAL MEDICINE

## 2021-02-25 PROCEDURE — 99397 PER PM REEVAL EST PAT 65+ YR: CPT | Performed by: INTERNAL MEDICINE

## 2021-02-25 PROCEDURE — 3078F DIAST BP <80 MM HG: CPT | Performed by: INTERNAL MEDICINE

## 2021-02-26 PROBLEM — Z96.652 HISTORY OF TOTAL KNEE ARTHROPLASTY, LEFT: Status: RESOLVED | Noted: 2019-12-24 | Resolved: 2021-02-26

## 2021-02-26 NOTE — PROGRESS NOTES
HPI:   Kayla Park is a 80year old female who presents for a Medicare Subsequent Annual Wellness visit (Pt already had Initial Annual Wellness).     Her last annual assessment has been over 1 year: Annual Physical due on 02/11/2021         Fall/Risk As Used       Ms. Deneen Pike already takes aspirin and has it on her medication list.   CAGE Alcohol screening   Michael Swain was screened for Alcohol abuse and had a score of 0 so is at low risk.     Patient Care Team: Patient Care Team:  Danni Caicedo MD as •  QUINAPRIL HCL 20 MG Oral Tab, TAKE 1 TABLET BY MOUTH EVERY DAY    •  METFORMIN  MG Oral Tab, TAKE 1 TABLET (500 MG TOTAL) BY MOUTH BEFORE DINNER. •  amLODIPine Besylate 10 MG Oral Tab, Take 1 tablet (10 mg total) by mouth daily.     •  Clop 40.00 years. She has never used smokeless tobacco. She reports that she does not drink alcohol or use drugs.      REVIEW OF SYSTEMS:   Review of Systems   GENERAL: feels well otherwise  SKIN: denies any unusual skin lesions  EYES: denies blurred vision or d Religion: No   Many people I talk to seem to mumble (or don't speak clearly): No People get annoyed because I misunderstand what they say: No   I misunderstand what others are saying and make inappropriate responses: No I avoid social activities because I c female who presents for a Medicare Assessment.      PLAN SUMMARY:   Diagnoses and all orders for this visit:    Annual physical exam  -     HEMOGLOBIN A1C  -     CBC, PLATELET; NO DIFFERENTIAL  -     COMP METABOLIC PANEL (14)      Spondylolysis, lumbar charity Family; Visiting Friends      This section provided for quick review of chart, separate sheet to patient  [address] [place] [address],[address] Internal Lab or Procedure External Lab or Procedure   Diabetes Screening      HbgA1C   Annually HgbA1C vaccine history found Please get once after your 65th birthday    Pneumococcal 23 (Pneumovax)  Covered Once after 65 No vaccine history found Please get once after your 65th birthday    Hepatitis B for Moderate/High Risk No vaccine history found Medium/hig

## 2021-02-26 NOTE — PATIENT INSTRUCTIONS
Corrina Israel's SCREENING SCHEDULE   Tests on this list are recommended by your physician but may not be covered, or covered at this frequency, by your insurer. Please check with your insurance carrier before scheduling to verify coverage.    PREVENTATI every 10 years- more often if abnormal Colonoscopy due on 06/10/2024 Update [facility] if applicable    Flex Sigmoidoscopy Screen  Covered every 5 years No results found for this or any previous visit. No flowsheet data found.      Fecal Occult Bloo 65th birthday    Pneumococcal 23 (Pneumovax)  Covered Once after 65 No orders found for this or any previous visit. Please get once after your 65th birthday    Hepatitis B for Moderate/High Risk       No orders found for this or any previous visit.  Medium/

## 2021-03-02 ENCOUNTER — TELEPHONE (OUTPATIENT)
Dept: INTERNAL MEDICINE CLINIC | Facility: CLINIC | Age: 71
End: 2021-03-02

## 2021-03-02 NOTE — TELEPHONE ENCOUNTER
Tried calling again. No answer. On hold for over 3 minutes. Please relay results if Lynne Arredondo calls back.

## 2021-03-02 NOTE — TELEPHONE ENCOUNTER
Chart reviewed. Last A1C done 8/21/20 6.0    Attempted to call Jose Cox. No answer  Will try again later.

## 2021-03-12 PROCEDURE — 3044F HG A1C LEVEL LT 7.0%: CPT | Performed by: INTERNAL MEDICINE

## 2021-03-13 LAB
ALBUMIN/GLOBULIN RATIO: 1.4 (CALC) (ref 1–2.5)
ALBUMIN: 4.1 G/DL (ref 3.6–5.1)
ALKALINE PHOSPHATASE: 66 U/L (ref 37–153)
ALT: 9 U/L (ref 6–29)
AST: 15 U/L (ref 10–35)
BILIRUBIN, TOTAL: 0.3 MG/DL (ref 0.2–1.2)
BUN: 13 MG/DL (ref 7–25)
CALCIUM: 9.5 MG/DL (ref 8.6–10.4)
CARBON DIOXIDE: 26 MMOL/L (ref 20–32)
CHLORIDE: 106 MMOL/L (ref 98–110)
CREATININE: 0.76 MG/DL (ref 0.6–0.93)
EGFR IF AFRICN AM: 92 ML/MIN/1.73M2
EGFR IF NONAFRICN AM: 79 ML/MIN/1.73M2
GLOBULIN: 2.9 G/DL (CALC) (ref 1.9–3.7)
GLUCOSE: 98 MG/DL (ref 65–99)
HEMATOCRIT: 35.7 % (ref 35–45)
HEMOGLOBIN A1C: 5.2 % OF TOTAL HGB
HEMOGLOBIN: 10.5 G/DL (ref 11.7–15.5)
MCH: 22.3 PG (ref 27–33)
MCHC: 29.4 G/DL (ref 32–36)
MCV: 76 FL (ref 80–100)
MPV: 10.6 FL (ref 7.5–12.5)
PLATELET COUNT: 456 THOUSAND/UL (ref 140–400)
POTASSIUM: 4.4 MMOL/L (ref 3.5–5.3)
PROTEIN, TOTAL: 7 G/DL (ref 6.1–8.1)
RDW: 15.8 % (ref 11–15)
RED BLOOD CELL COUNT: 4.7 MILLION/UL (ref 3.8–5.1)
SODIUM: 140 MMOL/L (ref 135–146)
WHITE BLOOD CELL COUNT: 3.3 THOUSAND/UL (ref 3.8–10.8)

## 2021-04-09 RX ORDER — ALENDRONATE SODIUM 70 MG/1
TABLET ORAL
Qty: 12 TABLET | Refills: 1 | Status: SHIPPED | OUTPATIENT
Start: 2021-04-09 | End: 2021-09-27

## 2021-04-12 ENCOUNTER — TELEMEDICINE (OUTPATIENT)
Dept: INTERNAL MEDICINE CLINIC | Facility: CLINIC | Age: 71
End: 2021-04-12

## 2021-04-12 DIAGNOSIS — R53.1 RIGHT SIDED WEAKNESS: Primary | ICD-10-CM

## 2021-04-12 PROCEDURE — 99214 OFFICE O/P EST MOD 30 MIN: CPT | Performed by: INTERNAL MEDICINE

## 2021-04-12 RX ORDER — AMLODIPINE BESYLATE 10 MG/1
TABLET ORAL
Qty: 90 TABLET | Refills: 0 | Status: SHIPPED | OUTPATIENT
Start: 2021-04-12 | End: 2021-07-05

## 2021-04-12 NOTE — PROGRESS NOTES
This is a telemedicine visit with live, interactive video and audio. Patient understands and accepts financial responsibility for any deductible, co-insurance and/or co-pays associated with this service.     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

## 2021-05-06 ENCOUNTER — TELEPHONE (OUTPATIENT)
Dept: INTERNAL MEDICINE CLINIC | Facility: CLINIC | Age: 71
End: 2021-05-06

## 2021-05-06 DIAGNOSIS — I63.9 CEREBROVASCULAR ACCIDENT (CVA), UNSPECIFIED MECHANISM (HCC): Primary | ICD-10-CM

## 2021-05-06 NOTE — TELEPHONE ENCOUNTER
Per patient she need more referral for her physical Therapy and send it to  [address] [facility] Tel#[phone] her next appointment is on 05/11/2021. Per patient her physical therapist is going to fax the progress notes.

## 2021-05-10 NOTE — TELEPHONE ENCOUNTER
We need a fax # to send the referral , message has been left for pt to call back and provide # also message left for Therapy.

## 2021-06-04 ENCOUNTER — OFFICE VISIT (OUTPATIENT)
Dept: PODIATRY CLINIC | Facility: CLINIC | Age: 71
End: 2021-06-04
Payer: COMMERCIAL

## 2021-06-04 VITALS — WEIGHT: 234 LBS | BODY MASS INDEX: 43 KG/M2

## 2021-06-04 DIAGNOSIS — E11.8 TYPE 2 DIABETES MELLITUS WITH COMPLICATION, WITHOUT LONG-TERM CURRENT USE OF INSULIN (HCC): Primary | ICD-10-CM

## 2021-06-04 PROCEDURE — 99213 OFFICE O/P EST LOW 20 MIN: CPT | Performed by: PODIATRIST

## 2021-06-04 NOTE — PROGRESS NOTES
Evangelina Correa is a 80year old female. No chief complaint on file. HPI:   Patient returns to the clinic for diabetic foot screening and exam and update. She has no complaints regarding her feet. Her hemoglobin A1c the last time taken was 5.2.   A Osteoarthritis    • Sleep apnea     no CPAP use   • Visual impairment     readers      Past Surgical History:   Procedure Laterality Date   • BRAIN SURGERY  2012    fluid leaking from brain    • CARPAL TUNNEL RELEASE Bilateral 2000   • COLONOSCOPY     • CO ulcerations or interdigital macerations noted at this visit. There are no lesions on the foot no hyperkeratoses. 2. Vascular: The patient has palpable dorsalis pedis and posterior tibial pulses with brisk capillary turn to the pedal digits.    3. Neurolo

## 2021-06-09 NOTE — TELEPHONE ENCOUNTER
Patient is calling as her physical therapist recommends that she has a referral put in for Occupational Therapy due to right sided weakness.      Pended referral, please advise

## 2021-06-11 ENCOUNTER — TELEPHONE (OUTPATIENT)
Dept: PHYSICAL THERAPY | Facility: HOSPITAL | Age: 71
End: 2021-06-11

## 2021-06-11 ENCOUNTER — OFFICE VISIT (OUTPATIENT)
Dept: OCCUPATIONAL MEDICINE | Facility: HOSPITAL | Age: 71
End: 2021-06-11
Attending: INTERNAL MEDICINE
Payer: MEDICARE

## 2021-06-11 DIAGNOSIS — I63.9 CEREBROVASCULAR ACCIDENT (CVA), UNSPECIFIED MECHANISM (HCC): ICD-10-CM

## 2021-06-11 PROCEDURE — 97166 OT EVAL MOD COMPLEX 45 MIN: CPT | Performed by: OCCUPATIONAL THERAPIST

## 2021-06-11 NOTE — PROGRESS NOTES
OCCUPATIONAL THERAPY NEUROLOGICAL EVALUATION:.   Referring Physician: Dr. Contreras Harris  Diagnosis:     Cerebrovascular accident (CVA), unspecified mechanism ([facility] (I63.9) Date of Service: 6/11/2021     PATIENT Tere Garcia is a 80year old female three point pinch. Functional deficits include: meal prep, fine motor tasks. Signs and symptoms are consistent with diagnosis of CVA. Pt and OT discussed evaluation findings, pathology, POC and HEP.   Pt voiced understanding and performs HEP correctly w Today's Treatment and Response:   Pt education was provided on exam findings, treatment diagnosis, treatment plan, expectations, and prognosis. Pt was also provided recommendations for importance of remaining active.   Patient was instruc have any questions, please contact me at Dept: [phone]    Sincerely,  Electronically signed by therapist: Baldo Grider, OTR/L, CHT  [de-identified] certification required: Yes  I certify the need for these services furnished under this plan of treatment

## 2021-06-14 ENCOUNTER — OFFICE VISIT (OUTPATIENT)
Dept: OCCUPATIONAL MEDICINE | Facility: HOSPITAL | Age: 71
End: 2021-06-14
Attending: INTERNAL MEDICINE
Payer: MEDICARE

## 2021-06-14 PROCEDURE — 97110 THERAPEUTIC EXERCISES: CPT | Performed by: OCCUPATIONAL THERAPIST

## 2021-06-14 PROCEDURE — 97140 MANUAL THERAPY 1/> REGIONS: CPT | Performed by: OCCUPATIONAL THERAPIST

## 2021-06-14 PROCEDURE — 97112 NEUROMUSCULAR REEDUCATION: CPT | Performed by: OCCUPATIONAL THERAPIST

## 2021-06-14 NOTE — PROGRESS NOTES
Dx: Cerebrovascular accident (CVA), unspecified mechanism ([facility] (I63.9)        Authorized # of Visits:  10       Next MD visit: none scheduled  Fall Risk: standard         Precautions: High blood pressure, cancer        Medication Changes since last visi shoulder flexion and abduction to at least 110 degrees for ease in pulling hair back. Patient will demonstrate improved hand dexterity as evident by decrease in 9 hole peg test time in right by 10 secs.   Patient will demonstrate increase in right  str

## 2021-07-05 RX ORDER — AMLODIPINE BESYLATE 10 MG/1
TABLET ORAL
Qty: 90 TABLET | Refills: 0 | Status: SHIPPED | OUTPATIENT
Start: 2021-07-05 | End: 2021-10-01

## 2021-07-08 ENCOUNTER — OFFICE VISIT (OUTPATIENT)
Dept: OCCUPATIONAL MEDICINE | Facility: HOSPITAL | Age: 71
End: 2021-07-08
Payer: MEDICARE

## 2021-07-08 ENCOUNTER — APPOINTMENT (OUTPATIENT)
Dept: OCCUPATIONAL MEDICINE | Facility: HOSPITAL | Age: 71
End: 2021-07-08
Attending: INTERNAL MEDICINE
Payer: MEDICARE

## 2021-07-08 PROCEDURE — 97110 THERAPEUTIC EXERCISES: CPT | Performed by: OCCUPATIONAL THERAPIST

## 2021-07-08 PROCEDURE — 97112 NEUROMUSCULAR REEDUCATION: CPT | Performed by: OCCUPATIONAL THERAPIST

## 2021-07-08 NOTE — PROGRESS NOTES
Dx: Cerebrovascular accident (CVA), unspecified mechanism ([facility] (I63.9)        Authorized # of Visits:  10       Next MD visit: none scheduled  Fall Risk: standard         Precautions: High blood pressure, cancer        Medication Changes since last visi Functional reaching- reaching across midline to  remove caps and replace 6 different size containers             Modalities                                                             Assessment: Observed patient compensating with using trunk and up

## 2021-07-13 ENCOUNTER — OFFICE VISIT (OUTPATIENT)
Dept: OCCUPATIONAL MEDICINE | Facility: HOSPITAL | Age: 71
End: 2021-07-13
Attending: INTERNAL MEDICINE
Payer: MEDICARE

## 2021-07-13 PROCEDURE — 97112 NEUROMUSCULAR REEDUCATION: CPT | Performed by: OCCUPATIONAL THERAPIST

## 2021-07-13 PROCEDURE — 97110 THERAPEUTIC EXERCISES: CPT | Performed by: OCCUPATIONAL THERAPIST

## 2021-07-13 NOTE — PROGRESS NOTES
Dx: Cerebrovascular accident (CVA), unspecified mechanism ([facility] (I63.9)        Authorized # of Visits:  10       Next MD visit: none scheduled  Fall Risk: standard         Precautions: High blood pressure, cancer        Medication Changes since last visi exercises 20 min, twice per day                 Therapeutic Activity                    functional activity- reach across in D1 pattern to  and place cones    5 min               Neuromuscular Re-education                      PNF diagonals D1, righ

## 2021-07-14 ENCOUNTER — TELEPHONE (OUTPATIENT)
Dept: PHYSICAL THERAPY | Facility: HOSPITAL | Age: 71
End: 2021-07-14

## 2021-07-15 ENCOUNTER — APPOINTMENT (OUTPATIENT)
Dept: OCCUPATIONAL MEDICINE | Facility: HOSPITAL | Age: 71
End: 2021-07-15
Attending: INTERNAL MEDICINE
Payer: MEDICARE

## 2021-07-16 ENCOUNTER — OFFICE VISIT (OUTPATIENT)
Dept: OCCUPATIONAL MEDICINE | Facility: HOSPITAL | Age: 71
End: 2021-07-16
Attending: INTERNAL MEDICINE
Payer: MEDICARE

## 2021-07-16 PROCEDURE — 97110 THERAPEUTIC EXERCISES: CPT | Performed by: OCCUPATIONAL THERAPIST

## 2021-07-16 NOTE — PROGRESS NOTES
Dx: Cerebrovascular accident (CVA), unspecified mechanism ([facility] (I63.9)        Authorized # of Visits:  10       Next MD visit: none scheduled  Fall Risk: standard         Precautions: High blood pressure, cancer        Medication Changes since last visi Digit abduction/adduction to  marbles      x20  Fine motor - place 30 marbles on large pegs in board  Three point pinch of green CP to replacevelcro checkers.  X 40          HEP instruction         shoulder AAROM cane exercise - see below   Red putty right  strength to at least 20 lbs for ease in picking up pot. .. .(Acheived)  Patient will demonstrate increase in right three point pinch to at least 10 lbs.     Plan: Continue to work towards increasing fine motor skills and strength in right arm    Ch

## 2021-07-18 RX ORDER — PRAVASTATIN SODIUM 20 MG
TABLET ORAL
Qty: 90 TABLET | Refills: 1 | Status: SHIPPED | OUTPATIENT
Start: 2021-07-18 | End: 2022-01-28

## 2021-07-20 ENCOUNTER — APPOINTMENT (OUTPATIENT)
Dept: OCCUPATIONAL MEDICINE | Facility: HOSPITAL | Age: 71
End: 2021-07-20
Attending: INTERNAL MEDICINE
Payer: MEDICARE

## 2021-07-22 ENCOUNTER — OFFICE VISIT (OUTPATIENT)
Dept: OCCUPATIONAL MEDICINE | Facility: HOSPITAL | Age: 71
End: 2021-07-22
Attending: INTERNAL MEDICINE
Payer: MEDICARE

## 2021-07-22 PROCEDURE — 97110 THERAPEUTIC EXERCISES: CPT | Performed by: OCCUPATIONAL THERAPIST

## 2021-07-22 NOTE — PROGRESS NOTES
Dx: Cerebrovascular accident (CVA), unspecified mechanism ([facility] (I63.9)        Authorized # of Visits:  10       Next MD visit: none scheduled  Fall Risk: standard         Precautions: High blood pressure, cancer        Medication Changes since last visi POM digit extension flicks  Pronation/Supination with 2 wts x 20  Pronation/Supination with 2 wts x 20  Pronation/Supination with 2 wts x 20        Three point pinch of green CP to replace velcro checkers      green clothespins x 40  fine motor - place lar be independent and compliant with comprehensive HEP to maintain progress achieved in OT. ..(ongoing)  Patient will demonstrate increase in right shoulder flexion and abduction to at least 110 degrees for ease in pulling hair back. .. .(Acheived)  Patient will

## 2021-07-30 ENCOUNTER — OFFICE VISIT (OUTPATIENT)
Dept: OCCUPATIONAL MEDICINE | Facility: HOSPITAL | Age: 71
End: 2021-07-30
Attending: INTERNAL MEDICINE
Payer: MEDICARE

## 2021-07-30 PROCEDURE — 97110 THERAPEUTIC EXERCISES: CPT | Performed by: OCCUPATIONAL THERAPIST

## 2021-07-30 NOTE — PROGRESS NOTES
Dx: Cerebrovascular accident (CVA), unspecified mechanism ([facility] (I63.9)        Authorized # of Visits:  10       Next MD visit: none scheduled  Fall Risk: standard         Precautions: High blood pressure, cancer        Medication Changes since last visi abd/add, IR/ER with cane x 10  Tricep PRE with Red Tband x 20  Tricep PRE with Red Tband x 20      POM POM lumbrical pinches with splint    x20 pinches  POM POM digit extension flicks  Pronation/Supination with 2 wts x 20  Pronation/Supination with 2 wts x Assessment: Patient continues to have pain with left thumb,  unable to maintain flexion however demonstrates partial active flexion. No pain with resisted IP flexion exercises or pinching or gripping exercises.  Recommend patient consult with MD concern

## 2021-08-03 ENCOUNTER — OFFICE VISIT (OUTPATIENT)
Dept: OCCUPATIONAL MEDICINE | Facility: HOSPITAL | Age: 71
End: 2021-08-03
Attending: INTERNAL MEDICINE
Payer: MEDICARE

## 2021-08-03 PROCEDURE — 97110 THERAPEUTIC EXERCISES: CPT | Performed by: OCCUPATIONAL THERAPIST

## 2021-08-03 PROCEDURE — 97140 MANUAL THERAPY 1/> REGIONS: CPT | Performed by: OCCUPATIONAL THERAPIST

## 2021-08-03 NOTE — PROGRESS NOTES
Dx: Cerebrovascular accident (CVA), unspecified mechanism ([facility] (I63.9)        Authorized # of Visits:  10       Next MD visit: none scheduled  Fall Risk: standard         Precautions: High blood pressure, cancer        Medication Changes since last visi checkers, alternate two point pinch x 40  fine motor task, remove bolt and add two weights to hammer and replace  3 min  AAROM shoulder flex/ext, abd/add, IR/ER with cane x 10  Tricep PRE with Red Tband x 20  Tricep PRE with Red Tband x 20  Tricep PRE with to pickup 10 cones x 2,  Reaching across midline to pickup 10 cones x 2,         POM POM  digit abduction/addiction    x20  Functional reaching- reaching across midline to  remove caps and replace 6 different size containers             Modal

## 2021-08-05 ENCOUNTER — OFFICE VISIT (OUTPATIENT)
Dept: OCCUPATIONAL MEDICINE | Facility: HOSPITAL | Age: 71
End: 2021-08-05
Attending: INTERNAL MEDICINE
Payer: MEDICARE

## 2021-08-05 PROCEDURE — 97110 THERAPEUTIC EXERCISES: CPT | Performed by: OCCUPATIONAL THERAPIST

## 2021-08-05 NOTE — PROGRESS NOTES
Dx: Cerebrovascular accident (CVA), unspecified mechanism ([facility] (I63.9)        Authorized # of Visits:  10       Next MD visit: none scheduled  Fall Risk: standard         Precautions: High blood pressure, cancer        Medication Changes since last visi right lateral pinch by 3 lbs , three point pinch by 7 lbs. . Goals:   Pt will be independent and compliant with comprehensive HEP to maintain progress achieved in OT. ..(ongoing)  Patient will demonstrate increase in right shoulder flexion and abduction to

## 2021-08-10 ENCOUNTER — OFFICE VISIT (OUTPATIENT)
Dept: OCCUPATIONAL MEDICINE | Facility: HOSPITAL | Age: 71
End: 2021-08-10
Attending: INTERNAL MEDICINE
Payer: MEDICARE

## 2021-08-10 PROCEDURE — 97110 THERAPEUTIC EXERCISES: CPT | Performed by: OCCUPATIONAL THERAPIST

## 2021-08-10 NOTE — PROGRESS NOTES
Dx: Cerebrovascular accident (CVA), unspecified mechanism ([facility] (I63.9)        Authorized # of Visits:  10       Next MD visit: none scheduled  Fall Risk: standard         Precautions: High blood pressure, cancer        Medication Changes since last visi details. Charges: TE3      Total Timed Treatment: 42 min  Total Treatment Time: 45 min                OT Discharge  Summary    Pt has attended 10 , cancelled 0, and no shown 0 visits in Occupational Therapy.      Subjective: Patient reports minimal disco in Covid 19 cases. Patient was advised of these findings, precautions, and treatment options and has agreed to actively participate in planning and for this course of care.     Thank you for your referral. If you have any questions, please contact me a

## 2021-08-12 ENCOUNTER — APPOINTMENT (OUTPATIENT)
Dept: OCCUPATIONAL MEDICINE | Facility: HOSPITAL | Age: 71
End: 2021-08-12
Attending: INTERNAL MEDICINE
Payer: MEDICARE

## 2021-08-26 ENCOUNTER — OFFICE VISIT (OUTPATIENT)
Dept: INTERNAL MEDICINE CLINIC | Facility: CLINIC | Age: 71
End: 2021-08-26
Payer: COMMERCIAL

## 2021-08-26 VITALS
DIASTOLIC BLOOD PRESSURE: 67 MMHG | BODY MASS INDEX: 41.41 KG/M2 | WEIGHT: 225 LBS | HEART RATE: 74 BPM | SYSTOLIC BLOOD PRESSURE: 102 MMHG | HEIGHT: 62 IN

## 2021-08-26 DIAGNOSIS — D50.8 OTHER IRON DEFICIENCY ANEMIA: ICD-10-CM

## 2021-08-26 DIAGNOSIS — E78.00 HYPERCHOLESTEREMIA: ICD-10-CM

## 2021-08-26 DIAGNOSIS — E11.00 TYPE 2 DIABETES MELLITUS WITH HYPEROSMOLARITY WITHOUT COMA, WITHOUT LONG-TERM CURRENT USE OF INSULIN (HCC): ICD-10-CM

## 2021-08-26 DIAGNOSIS — Z12.31 ENCOUNTER FOR SCREENING MAMMOGRAM FOR MALIGNANT NEOPLASM OF BREAST: ICD-10-CM

## 2021-08-26 DIAGNOSIS — M65.312 TRIGGER FINGER OF LEFT THUMB: Primary | ICD-10-CM

## 2021-08-26 PROCEDURE — 3074F SYST BP LT 130 MM HG: CPT | Performed by: INTERNAL MEDICINE

## 2021-08-26 PROCEDURE — 3008F BODY MASS INDEX DOCD: CPT | Performed by: INTERNAL MEDICINE

## 2021-08-26 PROCEDURE — 3078F DIAST BP <80 MM HG: CPT | Performed by: INTERNAL MEDICINE

## 2021-08-26 PROCEDURE — 99214 OFFICE O/P EST MOD 30 MIN: CPT | Performed by: INTERNAL MEDICINE

## 2021-08-26 NOTE — PROGRESS NOTES
HPI/Subjective:     Patient ID: Tyson Ruvalcaba is a 80year old female. She came in today for follow-up on her high blood pressure and diabetes/and also complaining of left thumb pain.  htn-is well controlled she is taking her medications regularly.   She frequency, hematuria and urgency. Musculoskeletal: Negative for arthralgias, back pain, gait problem, joint swelling, myalgias, neck pain and neck stiffness. Allergic/Immunologic: Negative.     Neurological: Negative for dizziness, tremors, speech diffi biopsy ([facility] 2009   • Diabetes ([facility]    • High blood pressure    • High cholesterol    • Incontinence     bladder   • Osteoarthritis    • Sleep apnea     no CPAP use   • Visual impairment     readers      Past Surgical History:   Procedure Laterality Date No scleral icterus. Right eye: No discharge. Left eye: No discharge. Conjunctiva/sclera: Conjunctivae normal.      Pupils: Pupils are equal, round, and reactive to light. Neck:      Thyroid: No thyroid mass or thyromegaly.       Vascul without long-term current use of insulin (HCC) watch diet, avoid carbs continue with current medication    Other iron deficiency anemia we will check her CBC iron panel    Trigger finger left thumb I referred to see hand specialist    Orders Placed This En

## 2021-09-08 RX ORDER — QUINAPRIL 20 MG/1
20 TABLET ORAL 2 TIMES DAILY
Qty: 180 TABLET | Refills: 1 | Status: SHIPPED | OUTPATIENT
Start: 2021-09-08

## 2021-09-08 NOTE — TELEPHONE ENCOUNTER
Refill passed per [facility], [facility] protocol.     Requested Prescriptions   Pending Prescriptions Disp Refills    QUINAPRIL HCL 20 MG Oral Tab [Pharmacy Med Name: QUINAPRIL 20 MG TABLET] 180 tablet 1     Sig: TAKE 1 TABLET BY MOUTH TWICE A DAY        Hyperten

## 2021-09-14 ENCOUNTER — MED REC SCAN ONLY (OUTPATIENT)
Dept: INTERNAL MEDICINE CLINIC | Facility: CLINIC | Age: 71
End: 2021-09-14

## 2021-09-14 PROCEDURE — 3044F HG A1C LEVEL LT 7.0%: CPT | Performed by: INTERNAL MEDICINE

## 2021-09-15 LAB
% SATURATION: 15 % (CALC) (ref 16–45)
CHOL/HDLC RATIO: 2.6 (CALC)
CHOLESTEROL, TOTAL: 171 MG/DL
FERRITIN: 11 NG/ML (ref 16–288)
HDL CHOLESTEROL: 67 MG/DL
HEMATOCRIT: 41.2 % (ref 35–45)
HEMOGLOBIN A1C: 5.4 % OF TOTAL HGB
HEMOGLOBIN: 12.8 G/DL (ref 11.7–15.5)
IRON BINDING CAPACITY: 430 MCG/DL (CALC) (ref 250–450)
IRON, TOTAL: 63 MCG/DL (ref 45–160)
LDL-CHOLESTEROL: 86 MG/DL (CALC)
MCH: 25 PG (ref 27–33)
MCHC: 31.1 G/DL (ref 32–36)
MCV: 80.5 FL (ref 80–100)
MPV: 10.7 FL (ref 7.5–12.5)
NON-HDL CHOLESTEROL: 104 MG/DL (CALC)
PLATELET COUNT: 330 THOUSAND/UL (ref 140–400)
RDW: 15.2 % (ref 11–15)
RED BLOOD CELL COUNT: 5.12 MILLION/UL (ref 3.8–5.1)
TRIGLYCERIDES: 89 MG/DL
WHITE BLOOD CELL COUNT: 2.9 THOUSAND/UL (ref 3.8–10.8)

## 2021-09-27 RX ORDER — ALENDRONATE SODIUM 70 MG/1
TABLET ORAL
Qty: 12 TABLET | Refills: 1 | OUTPATIENT
Start: 2021-09-27

## 2021-09-27 RX ORDER — ALENDRONATE SODIUM 70 MG/1
70 TABLET ORAL WEEKLY
Qty: 12 TABLET | Refills: 1 | Status: SHIPPED | OUTPATIENT
Start: 2021-09-27

## 2021-10-01 RX ORDER — AMLODIPINE BESYLATE 10 MG/1
10 TABLET ORAL DAILY
Qty: 90 TABLET | Refills: 1 | Status: SHIPPED | OUTPATIENT
Start: 2021-10-01 | End: 2022-04-13

## 2021-10-01 NOTE — TELEPHONE ENCOUNTER
Refill passed per [address] protocol. Requested Prescriptions   Pending Prescriptions Disp Refills    AMLODIPINE 10 MG Oral Tab [Pharmacy Med Name: AMLODIPINE BESYLATE 10 MG TAB] 90 tablet 0     Sig: TAKE 1 TABLET BY MOUTH EVERY DAY        Hypertensive Medications Protocol Passed - 10/1/2021  1:18 AM        Passed - CMP or BMP in past 12 months        Passed - Appointment in past 6 or next 3 months        Passed - GFR  > 50     Lab Results   Component Value Date    GFRAA 92 03/12/2021                        Future Appointments         Provider Department Appt Notes    In 4 days [address] [place] [address] LAST ZACH: 8/19/2020. MAY NEED A W/C. In 2 weeks Desi Granado MD [facility] left knee f/up    In 2 months Keyonna Gunn MD [address] [facility]     In 8 months Javed Subramanian, [address] [place] [address].  [address], [facility] [place] 1 yr f/u             Recent Outpatient Visits              3 weeks ago History of total knee arthroplasty, left    [facility] Michael Rene MD    Office Visit    1 month ago Trigger finger of left thumb    [address], [address] [place], [place] [address]Gume MD    Office Visit    1 month ago     [facility]    Office [facility]    1 month ago     [facility]    Office Visit    1 month ago     [address] [place] [address] [facility]    Office Visit

## 2021-10-05 ENCOUNTER — HOSPITAL ENCOUNTER (OUTPATIENT)
Dept: MAMMOGRAPHY | Facility: HOSPITAL | Age: 71
Discharge: HOME | End: 2021-10-05
Attending: INTERNAL MEDICINE
Payer: MEDICARE

## 2021-10-05 DIAGNOSIS — Z12.31 ENCOUNTER FOR SCREENING MAMMOGRAM FOR MALIGNANT NEOPLASM OF BREAST: ICD-10-CM

## 2021-10-05 PROCEDURE — 77067 SCR MAMMO BI INCL CAD: CPT | Performed by: INTERNAL MEDICINE

## 2021-10-05 PROCEDURE — 77063 BREAST TOMOSYNTHESIS BI: CPT | Performed by: INTERNAL MEDICINE

## 2021-10-28 ENCOUNTER — IMMUNIZATION (OUTPATIENT)
Dept: LAB | Facility: HOSPITAL | Age: 71
End: 2021-10-28
Attending: EMERGENCY MEDICINE
Payer: MEDICARE

## 2021-10-28 DIAGNOSIS — Z23 NEED FOR VACCINATION: Primary | ICD-10-CM

## 2021-10-28 PROCEDURE — 0064A SARSCOV2 VAC 50MCG/0.25ML IM: CPT

## 2021-12-12 RX ORDER — FERROUS SULFATE 325(65) MG
TABLET ORAL
Qty: 90 TABLET | Refills: 0 | Status: SHIPPED | OUTPATIENT
Start: 2021-12-12

## 2021-12-16 ENCOUNTER — OFFICE VISIT (OUTPATIENT)
Dept: INTERNAL MEDICINE CLINIC | Facility: CLINIC | Age: 71
End: 2021-12-16
Payer: COMMERCIAL

## 2021-12-16 VITALS
WEIGHT: 229 LBS | BODY MASS INDEX: 42.14 KG/M2 | DIASTOLIC BLOOD PRESSURE: 72 MMHG | SYSTOLIC BLOOD PRESSURE: 125 MMHG | HEIGHT: 62 IN | HEART RATE: 70 BPM

## 2021-12-16 DIAGNOSIS — I10 PRIMARY HYPERTENSION: Primary | ICD-10-CM

## 2021-12-16 PROCEDURE — 3008F BODY MASS INDEX DOCD: CPT | Performed by: INTERNAL MEDICINE

## 2021-12-16 PROCEDURE — 3074F SYST BP LT 130 MM HG: CPT | Performed by: INTERNAL MEDICINE

## 2021-12-16 PROCEDURE — 99213 OFFICE O/P EST LOW 20 MIN: CPT | Performed by: INTERNAL MEDICINE

## 2021-12-16 PROCEDURE — 3078F DIAST BP <80 MM HG: CPT | Performed by: INTERNAL MEDICINE

## 2021-12-16 NOTE — PROGRESS NOTES
Subjective:     Patient ID: Chery Lauren is a 80year old female. HPI   She came today for follow-up on her high blood pressure and diabetes. htn-she is checking her blood pressure at home and is well controlled.   She is taking her medications regul 1,000 mg by mouth daily. • Turmeric Curcumin 500 MG Oral Cap Take 1 tablet by mouth 2 (two) times daily.          Allergies:No Known Allergies    Past Medical History:   Diagnosis Date   • Acute ischemic left MCA stroke ([facility] 1/14/2021   • Arthritis soft.   Musculoskeletal:         General: Normal range of motion. Cervical back: Normal range of motion and neck supple. Skin:     General: Skin is warm. Neurological:      General: No focal deficit present. Mental Status: She is alert.  Menta

## 2021-12-24 NOTE — PATIENT INSTRUCTIONS
Type 2 diabetes mellitus with hyperosmolarity without coma, without long-term current use of insulin (hcc)  (primary encounter diagnosis) we will check her hemoglobin A1c today advised to continue the same medication check blood pressure at home and bring no

## 2022-01-13 RX ORDER — BLOOD SUGAR DIAGNOSTIC
STRIP MISCELLANEOUS
Qty: 100 STRIP | Refills: 3 | Status: SHIPPED | OUTPATIENT
Start: 2022-01-13

## 2022-01-13 NOTE — TELEPHONE ENCOUNTER
Refill passed per [place] [facility], [facility] protocol. Requested Prescriptions   Pending Prescriptions Disp Refills    ONETOUCH ULTRA In Vitro Strip [place] Med Name: ONE TOUCH ULTRA BLUE TEST STRP] 100 strip 1     Sig: USE ONCE DAILY        Diabetic Supplies Protocol Passed - 1/13/2022  9:52 AM        Passed - Appointment in past 12 or next 3 months               Recent Outpatient Visits              4 weeks ago Primary hypertension    Meagan Huntley MD    Office Visit    2 months ago History of total knee arthroplasty, left    [address] [facility] [address] Akosua Farrell MD    Office Visit    4 months ago History of total knee arthroplasty, left    [address] [facility] [address] Ak[facility] Farrell MD    Office Visit    4 months ago Trigger finger of left thumb    [facility], [facility], [address] [place], [place] [address]Gume MD    Office Visit    5 months ago     [address] [place], [place] [address]    Office Visit            Future Appointments         Provider Department Appt Notes    In 2 months Demarcus Rand MD [facility], [facility], [facility]    In 4 months Teresa Duncan, [address] [place] [address].  [address], [place] 1 yr f/u

## 2022-01-28 RX ORDER — PRAVASTATIN SODIUM 20 MG
TABLET ORAL
Qty: 90 TABLET | Refills: 1 | Status: SHIPPED | OUTPATIENT
Start: 2022-01-28 | End: 2022-08-01

## 2022-01-28 NOTE — TELEPHONE ENCOUNTER
Refill passed per [facility] protocol. Requested Prescriptions   Pending Prescriptions Disp Refills    PRAVASTATIN 20 MG Oral Tab [Pharmacy Med Name: PRAVASTATIN SODIUM 20 MG TAB] 90 tablet 1     Sig: TAKE 1 TABLET BY MOUTH EVERYDAY AT BEDTIME        Cholesterol Medication Protocol Passed - 1/28/2022 12:16 AM        Passed - ALT in past 12 months        Passed - LDL in past 12 months        Passed - Last ALT < 80       Lab Results   Component Value Date    ALT 9 03/12/2021             Passed - Last LDL < 130     Lab Results   Component Value Date    LDL 86 09/14/2021               Passed - Appointment in past 12 or next 3 months              Future Appointments         Provider Department Appt Notes    In 1 month Horacio Amador MD [place] [employer] [place], [facility], [address] [place] [address]    In 4 months Bianca Warner, [facility] [facility].  [address], [place] 1 yr f/u            Recent Outpatient Visits              1 month ago Primary hypertension    [facility], Alaina Menendez MD    Office Visit    3 months ago History of total knee arthroplasty, left    [address] Mirza May MD    Office Visit    4 months ago History of total knee arthroplasty, left    [address] Mirza May MD    Office Visit    5 months ago Trigger finger of left thumb    [facility], [facility]    Office Visit    5 months ago     [address] [place] [address]    Office Visit

## 2022-02-25 NOTE — TELEPHONE ENCOUNTER
Refill passed per [facility], [facility] protocol. Requested Prescriptions   Pending Prescriptions Disp Refills    METFORMIN 500 MG Oral Tab [Pharmacy Med Name: METFORMIN  MG TABLET] 90 tablet 1     Sig: TAKE 1 TABLET (500 MG TOTAL) BY MOUTH BEFORE DINNER. Diabetes Medication Protocol Passed - 2/25/2022  1:56 AM        Passed - Last A1C < 7.5 and within past 6 months     Lab Results   Component Value Date    A1C 5.4 09/14/2021               Passed - Appointment in past 6 or next 3 months        Passed - GFR  > 50     Lab Results   Component Value Date    GFRAA 92 03/12/2021                 Passed - GFR in the past 12 months             Recent Outpatient Visits              2 months ago Primary hypertension    [facility], [facility] [place]    Office Visit    4 months ago History of total knee arthroplasty, left    [facility] Navid Fofana MD    Office Visit    5 months ago History of total knee arthroplasty, left    [facility] Navid Fofana MD    Office Visit    6 months ago Trigger finger of left thumb    Spring Daniels MD    Office Visit    6 months ago     [address] [place] [address]    Office Visit           Future Appointments         Provider Department Appt Notes    In 2 weeks Yary Briseno MD [facility], [facility], [address]    In 3 months Lyudmila Miner, [address] [facility].  [address], [place] 1 yr f/u

## 2022-02-28 RX ORDER — QUINAPRIL 20 MG/1
20 TABLET ORAL 2 TIMES DAILY
Qty: 180 TABLET | Refills: 1 | Status: SHIPPED | OUTPATIENT
Start: 2022-02-28 | End: 2022-08-31

## 2022-03-01 NOTE — TELEPHONE ENCOUNTER
Refill passed per [employer] protocol. Requested Prescriptions   Pending Prescriptions Disp Refills    QUINAPRIL HCL 20 MG Oral Tab [Pharmacy Med Name: QUINAPRIL 20 MG TABLET] 180 tablet 1     Sig: TAKE 1 TABLET BY MOUTH TWICE A DAY        Hypertensive Medications Protocol Passed - 2/28/2022  3:49 PM        Passed - CMP or BMP in past 12 months        Passed - Appointment in past 6 or next 3 months        Passed - GFR  > 50     Lab Results   Component Value Date    GFRAA 92 03/12/2021                        Future Appointments         Provider Department Appt Notes    In 2 weeks Jerome Mcconnell MD [facility], [facility], [address]    In 3 months Andrew Patel, [address] [facility].  [address], [place] 1 yr f/u             Recent Outpatient Visits              2 months ago Primary hypertension    [facility], Tl Jones MD    Office Visit    4 months ago History of total knee arthroplasty, left    [address] Samaniego MD    Office Visit    5 months ago History of total knee arthroplasty, left    [address] [facility] Elias Samaniego MD    Office Visit    6 months ago Trigger finger of left thumb    Alysia Bajwa MD    Office Visit    6 months ago     [facility] [place]    Office Visit

## 2022-03-17 ENCOUNTER — TELEPHONE (OUTPATIENT)
Dept: INTERNAL MEDICINE CLINIC | Facility: CLINIC | Age: 72
End: 2022-03-17

## 2022-03-17 ENCOUNTER — OFFICE VISIT (OUTPATIENT)
Dept: INTERNAL MEDICINE CLINIC | Facility: CLINIC | Age: 72
End: 2022-03-17
Payer: COMMERCIAL

## 2022-03-17 VITALS
TEMPERATURE: 97 F | BODY MASS INDEX: 43.43 KG/M2 | HEART RATE: 69 BPM | SYSTOLIC BLOOD PRESSURE: 118 MMHG | DIASTOLIC BLOOD PRESSURE: 71 MMHG | WEIGHT: 236 LBS | HEIGHT: 62 IN

## 2022-03-17 DIAGNOSIS — E11.00 TYPE 2 DIABETES MELLITUS WITH HYPEROSMOLARITY WITHOUT COMA, WITHOUT LONG-TERM CURRENT USE OF INSULIN (HCC): ICD-10-CM

## 2022-03-17 DIAGNOSIS — Z00.00 ENCOUNTER FOR ANNUAL HEALTH EXAMINATION: Primary | ICD-10-CM

## 2022-03-17 DIAGNOSIS — I77.1 STRICTURE OF ARTERY (HCC): ICD-10-CM

## 2022-03-17 DIAGNOSIS — Z28.21 IMMUNIZATION NOT CARRIED OUT BECAUSE OF PATIENT REFUSAL: ICD-10-CM

## 2022-03-17 DIAGNOSIS — E11.8 TYPE 2 DIABETES MELLITUS WITH COMPLICATION, WITHOUT LONG-TERM CURRENT USE OF INSULIN (HCC): ICD-10-CM

## 2022-03-17 PROBLEM — Z96.652 HISTORY OF TOTAL KNEE ARTHROPLASTY, LEFT: Status: RESOLVED | Noted: 2019-12-24 | Resolved: 2022-03-17

## 2022-03-17 PROBLEM — M18.11 ARTHRITIS OF CARPOMETACARPAL (CMC) JOINT OF RIGHT THUMB: Status: RESOLVED | Noted: 2018-11-27 | Resolved: 2022-03-17

## 2022-03-17 PROBLEM — I63.9 ACUTE ISCHEMIC STROKE (HCC): Status: RESOLVED | Noted: 2021-01-14 | Resolved: 2022-03-17

## 2022-03-17 PROCEDURE — 3008F BODY MASS INDEX DOCD: CPT | Performed by: INTERNAL MEDICINE

## 2022-03-17 PROCEDURE — G0009 ADMIN PNEUMOCOCCAL VACCINE: HCPCS | Performed by: INTERNAL MEDICINE

## 2022-03-17 PROCEDURE — 3078F DIAST BP <80 MM HG: CPT | Performed by: INTERNAL MEDICINE

## 2022-03-17 PROCEDURE — G0439 PPPS, SUBSEQ VISIT: HCPCS | Performed by: INTERNAL MEDICINE

## 2022-03-17 PROCEDURE — 3074F SYST BP LT 130 MM HG: CPT | Performed by: INTERNAL MEDICINE

## 2022-03-17 PROCEDURE — 90670 PCV13 VACCINE IM: CPT | Performed by: INTERNAL MEDICINE

## 2022-03-17 PROCEDURE — 99397 PER PM REEVAL EST PAT 65+ YR: CPT | Performed by: INTERNAL MEDICINE

## 2022-03-17 PROCEDURE — 96160 PT-FOCUSED HLTH RISK ASSMT: CPT | Performed by: INTERNAL MEDICINE

## 2022-03-17 NOTE — TELEPHONE ENCOUNTER
Called was transferred to me and I informed patient she doesn't need the paper copy of her labs.  [facility] can pull put her order when she goes for labs

## 2022-03-17 NOTE — TELEPHONE ENCOUNTER
Patient stated she was to get a hard copy script to take to [facility] for [facility] and it was not provided to her with her after visit summery notes from today's 3/17 appointment with Dr. Tasha Delacruz. Patient requesting a copy.

## 2022-03-25 PROCEDURE — 3061F NEG MICROALBUMINURIA REV: CPT | Performed by: INTERNAL MEDICINE

## 2022-03-25 PROCEDURE — 3044F HG A1C LEVEL LT 7.0%: CPT | Performed by: INTERNAL MEDICINE

## 2022-03-26 LAB
ALBUMIN/GLOBULIN RATIO: 1.6 (CALC) (ref 1–2.5)
ALBUMIN: 4.4 G/DL (ref 3.6–5.1)
ALKALINE PHOSPHATASE: 67 U/L (ref 37–153)
ALT: 11 U/L (ref 6–29)
AST: 14 U/L (ref 10–35)
BILIRUBIN, TOTAL: 0.4 MG/DL (ref 0.2–1.2)
BUN: 16 MG/DL (ref 7–25)
CALCIUM: 9.7 MG/DL (ref 8.6–10.4)
CARBON DIOXIDE: 28 MMOL/L (ref 20–32)
CHLORIDE: 106 MMOL/L (ref 98–110)
CREATININE, RANDOM URINE: 78 MG/DL (ref 20–275)
CREATININE: 0.69 MG/DL (ref 0.6–0.93)
EGFR IF AFRICN AM: 102 ML/MIN/1.73M2
EGFR IF NONAFRICN AM: 88 ML/MIN/1.73M2
GLOBULIN: 2.8 G/DL (CALC) (ref 1.9–3.7)
GLUCOSE: 96 MG/DL (ref 65–99)
HEMOGLOBIN A1C: 5.6 % OF TOTAL HGB
MICROALBUMIN: <0.2 MG/DL
POTASSIUM: 4.2 MMOL/L (ref 3.5–5.3)
PROTEIN, TOTAL: 7.2 G/DL (ref 6.1–8.1)
SODIUM: 141 MMOL/L (ref 135–146)

## 2022-03-29 RX ORDER — ALENDRONATE SODIUM 70 MG/1
70 TABLET ORAL WEEKLY
Qty: 12 TABLET | Refills: 1 | Status: SHIPPED | OUTPATIENT
Start: 2022-03-29 | End: 2022-09-26

## 2022-03-29 NOTE — TELEPHONE ENCOUNTER
Refill passed per [facility] protocol. Requested Prescriptions   Pending Prescriptions Disp Refills    ALENDRONATE 70 MG Oral Tab [Pharmacy Med Name: ALENDRONATE SODIUM 70 MG TAB] 12 tablet 1     Sig: Take 1 tablet (70 mg total) by mouth once a week. Osteoporosis Medication Protocol Passed - 3/29/2022 12:17 AM        Passed - Appointment within past 12 or next 3 months                Recent Outpatient Visits              1 week ago Encounter for annual health examination    Lourdes Han MD    Office Visit    3 months ago Primary hypertension    Ashley Wilson MD    Office Visit    5 months ago History of total knee arthroplasty, left    [facility] Mark Bahena MD    Office Visit    6 months ago History of total knee arthroplasty, left    [facility] Mark Bahena MD    Office Visit    7 months ago Trigger finger of left thumb    Ashley Wilson MD    Office Visit             Future Appointments         Provider Department Appt Notes    [facility]  [address], [place] 1 yr f/u

## 2022-04-13 RX ORDER — AMLODIPINE BESYLATE 10 MG/1
10 TABLET ORAL DAILY
Qty: 90 TABLET | Refills: 1 | Status: SHIPPED | OUTPATIENT
Start: 2022-04-13 | End: 2022-10-05

## 2022-04-13 NOTE — TELEPHONE ENCOUNTER
Refill passed per [place] protocol. Requested Prescriptions   Pending Prescriptions Disp Refills    AMLODIPINE 10 MG Oral Tab [Pharmacy Med Name: AMLODIPINE BESYLATE 10 MG TAB] 90 tablet 1     Sig: TAKE 1 TABLET BY MOUTH EVERY DAY        Hypertensive Medications Protocol Passed - 4/13/2022 11:10 AM        Passed - CMP or BMP in past 12 months        Passed - Appointment in past 6 or next 3 months        Passed - GFR  > 50     Lab Results   Component Value Date    GFRAA 102 03/25/2022                      Recent Outpatient Visits              3 weeks ago Encounter for annual health examination    Anibal Christianson MD    Office Visit    3 months ago Primary hypertension    Daniella Melendrez MD    Office Visit    5 months ago History of total knee arthroplasty, left    [facility] MD Tracie    Office Visit    7 months ago History of total knee arthroplasty, left    [facility] MD Tracie    Office Visit    7 months ago Trigger finger of left thumb    Daniella Melendrez MD    Office Visit           Future Appointments         Provider Department Appt Notes    In 1 month Regis Rodriguez, [address] [facility].  [address], [place] 1 yr f/u

## 2022-04-25 ENCOUNTER — NURSE TRIAGE (OUTPATIENT)
Dept: INTERNAL MEDICINE CLINIC | Facility: CLINIC | Age: 72
End: 2022-04-25

## 2022-05-26 ENCOUNTER — TELEPHONE (OUTPATIENT)
Dept: INTERNAL MEDICINE CLINIC | Facility: CLINIC | Age: 72
End: 2022-05-26

## 2022-06-03 ENCOUNTER — OFFICE VISIT (OUTPATIENT)
Dept: PODIATRY CLINIC | Facility: CLINIC | Age: 72
End: 2022-06-03
Payer: COMMERCIAL

## 2022-06-03 DIAGNOSIS — E11.8 TYPE 2 DIABETES MELLITUS WITH COMPLICATION, WITHOUT LONG-TERM CURRENT USE OF INSULIN (HCC): Primary | ICD-10-CM

## 2022-06-03 PROCEDURE — 1126F AMNT PAIN NOTED NONE PRSNT: CPT | Performed by: PODIATRIST

## 2022-06-03 PROCEDURE — 99213 OFFICE O/P EST LOW 20 MIN: CPT | Performed by: PODIATRIST

## 2022-08-01 RX ORDER — PRAVASTATIN SODIUM 20 MG
TABLET ORAL
Qty: 90 TABLET | Refills: 1 | Status: SHIPPED | OUTPATIENT
Start: 2022-08-01

## 2022-08-17 ENCOUNTER — OFFICE VISIT (OUTPATIENT)
Dept: INTERNAL MEDICINE CLINIC | Facility: CLINIC | Age: 72
End: 2022-08-17
Payer: COMMERCIAL

## 2022-08-17 VITALS
WEIGHT: 239 LBS | TEMPERATURE: 98 F | DIASTOLIC BLOOD PRESSURE: 74 MMHG | SYSTOLIC BLOOD PRESSURE: 121 MMHG | BODY MASS INDEX: 43.98 KG/M2 | HEIGHT: 62 IN | HEART RATE: 76 BPM

## 2022-08-17 DIAGNOSIS — E11.00 TYPE 2 DIABETES MELLITUS WITH HYPEROSMOLARITY WITHOUT COMA, WITHOUT LONG-TERM CURRENT USE OF INSULIN (HCC): ICD-10-CM

## 2022-08-17 DIAGNOSIS — Z12.31 ENCOUNTER FOR SCREENING MAMMOGRAM FOR MALIGNANT NEOPLASM OF BREAST: ICD-10-CM

## 2022-08-17 DIAGNOSIS — M25.511 ACUTE PAIN OF RIGHT SHOULDER: Primary | ICD-10-CM

## 2022-08-17 LAB
APPEARANCE: CLEAR
BILIRUBIN: NEGATIVE
GLUCOSE (URINE DIPSTICK): NEGATIVE MG/DL
KETONES (URINE DIPSTICK): NEGATIVE MG/DL
LEUKOCYTES: NEGATIVE
MULTISTIX LOT#: NORMAL NUMERIC
NITRITE, URINE: NEGATIVE
OCCULT BLOOD: NEGATIVE
PH, URINE: 5 (ref 4.5–8)
PROTEIN (URINE DIPSTICK): NEGATIVE MG/DL
SPECIFIC GRAVITY: 1.02 (ref 1–1.03)
URINE-COLOR: YELLOW
UROBILINOGEN,SEMI-QN: 1 MG/DL (ref 0–1.9)

## 2022-08-17 PROCEDURE — 1125F AMNT PAIN NOTED PAIN PRSNT: CPT | Performed by: INTERNAL MEDICINE

## 2022-08-17 PROCEDURE — 3078F DIAST BP <80 MM HG: CPT | Performed by: INTERNAL MEDICINE

## 2022-08-17 PROCEDURE — 99214 OFFICE O/P EST MOD 30 MIN: CPT | Performed by: INTERNAL MEDICINE

## 2022-08-17 PROCEDURE — 3074F SYST BP LT 130 MM HG: CPT | Performed by: INTERNAL MEDICINE

## 2022-08-17 PROCEDURE — 3008F BODY MASS INDEX DOCD: CPT | Performed by: INTERNAL MEDICINE

## 2022-08-17 PROCEDURE — 81003 URINALYSIS AUTO W/O SCOPE: CPT | Performed by: INTERNAL MEDICINE

## 2022-08-17 RX ORDER — METHYLPREDNISOLONE 4 MG/1
TABLET ORAL
Qty: 1 EACH | Refills: 0 | Status: SHIPPED | OUTPATIENT
Start: 2022-08-17

## 2022-08-25 NOTE — TELEPHONE ENCOUNTER
Please review; protocol failed. Or has no protocol    Requested Prescriptions   Pending Prescriptions Disp Refills    METFORMIN 500 MG Oral Tab [Pharmacy Med Name: METFORMIN  MG TABLET] 90 tablet 1     Sig: Take 1 tablet (500 mg total) by mouth Before Dinner. Diabetes Medication Protocol Failed - 8/25/2022 12:18 AM        Failed - GFR in the past 12 months        Passed - Last A1C < 7.5 and within past 6 months     Lab Results   Component Value Date    A1C 5.6 03/25/2022               Passed - In person appointment or virtual visit in the past 6 mos or appointment in next 3 mos       Recent Outpatient Visits              1 week ago Acute pain of right shoulder    Paul Willingham MD    Office Visit    2 months ago Type 2 diabetes mellitus with complication, without long-term current use of insulin [facility])    [facility], [facility]. [facility], [place], Elijah Overly, DPM    Office Visit    5 months ago Encounter for The [address] [facility] Gume Marte MD    Office Visit    8 months ago Primary hypertension    Barbara Ragland MD    Office Visit    10 months ago History of total knee arthroplasty, left    [address] [facility] MD Anju    Office Visit                 Passed - GFR > 50     No results found for: [facility]                     Recent Outpatient Visits              1 week ago Acute pain of right shoulder    Paul Willingham MD    Office Visit    2 months ago Type 2 diabetes mellitus with complication, without long-term current use of insulin [facility])    [facility], [facility].  [facility] Street, [place], Elijah Overly, DPM    Office Visit    5 months ago Encounter for The [address] [facility] Gume Marte MD    Office Visit    8 months ago Primary hypertension    [facility], [facility], [address] [place] [address], Amber Aponte MD    Office Visit    10 months ago History of total knee arthroplasty, left    [facility] Akosua Farrell MD    Office Visit

## 2022-08-31 RX ORDER — QUINAPRIL 20 MG/1
20 TABLET ORAL 2 TIMES DAILY
Qty: 180 TABLET | Refills: 1 | Status: SHIPPED | OUTPATIENT
Start: 2022-08-31 | End: 2023-01-09

## 2022-08-31 NOTE — TELEPHONE ENCOUNTER
Refill passed per [facility] protocol. Requested Prescriptions   Pending Prescriptions Disp Refills    QUINAPRIL HCL 20 MG Oral Tab [Pharmacy Med Name: QUINAPRIL 20 MG TABLET] 180 tablet 1     Sig: TAKE 1 TABLET BY MOUTH TWICE A DAY        Hypertensive Medications Protocol Passed - 8/31/2022 12:17 AM        Passed - In person appointment in the past 12 or next 3 months       Recent Outpatient Visits              2 weeks ago Acute pain of right shoulder    Vasquez Merlos MD    Office Visit    2 months ago Type 2 diabetes mellitus with complication, without long-term current use of insulin [facility])    [facility]. [address], [place], Kalia Taylor DPM    Office Visit    5 months ago Encounter for annual health examination    Vasquez Merlos MD    Office Visit    8 months ago Primary hypertension    Bonifacio Zazueta MD    Office Visit    10 months ago History of total knee arthroplasty, left    [address] [facility] [address] Shelbi Gustafson MD    Office Visit                 Passed - Last BP reading less than 140/90     BP Readings from Last 1 Encounters:  08/17/22 : 121/74                Passed - CMP or BMP in past 6 months     Recent Results (from the past 4392 hour(s))   COMP METABOLIC PANEL (14)    Collection Time: 03/25/22 12:32 PM   Result Value Ref Range    GLUCOSE 96 65 - 99 mg/dL     Comment:               Fasting reference interval         UREA NITROGEN (BUN) 16 7 - 25 mg/dL    CREATININE 0.69 0.60 - 0.93 mg/dL     Comment: For patients >52years of age, the reference limit  for Creatinine is approximately 13% higher for people  identified as -American. eGFR NON-AFR.  AMERICAN 88 > OR = 60 mL/min/1.73m2    eGFR AFRICAN AMERICAN 102 > OR = 60 mL/min/1.73m2    BUN/CREATININE RATIO NOT APPLICABLE 6 - 22 (calc)    SODIUM 141 135 - 146 mmol/L    POTASSIUM 4.2 3.5 - 5.3 mmol/L CHLORIDE 106 98 - 110 mmol/L    CARBON DIOXIDE 28 20 - 32 mmol/L    CALCIUM 9.7 8.6 - 10.4 mg/dL    PROTEIN, TOTAL 7.2 6.1 - 8.1 g/dL    ALBUMIN 4.4 3.6 - 5.1 g/dL    GLOBULIN 2.8 1.9 - 3.7 g/dL (calc)    ALBUMIN/GLOBULIN RATIO 1.6 1.0 - 2.5 (calc)    BILIRUBIN, TOTAL 0.4 0.2 - 1.2 mg/dL    ALKALINE PHOSPHATASE 67 37 - 153 U/L    AST 14 10 - 35 U/L    ALT 11 6 - 29 U/L     *Note: Due to a large number of results and/or encounters for the requested time period, some results have not been displayed. A complete set of results can be found in Results Review. Passed - In person appointment or virtual visit in the past 6 months       Recent Outpatient Visits              2 weeks ago Acute pain of right shoulder    Racheal Duval MD    Office Visit    2 months ago Type 2 diabetes mellitus with complication, without long-term current use of insulin [facility])    [facility], [facility]. [facility], [place], Frances Britain, DPM    Office Visit    5 months ago Encounter for The [address] [facility]    Office Visit    8 months ago Primary hypertension    Sonam Bentley MD    Office Visit    10 months ago History of total knee arthroplasty, left    [facility] Leslye Cao MD    Office Visit                 Passed - GFR > 50     No results found for: [facility]                          Recent Outpatient Visits              2 weeks ago Acute pain of right shoulder    Racheal Duval MD    Office Visit    2 months ago Type 2 diabetes mellitus with complication, without long-term current use of insulin [facility])    [facility], [facility].  [facility] Street, [place], Frances Britain, DPM    Office Visit    5 months ago Encounter for The [address] [facility]    Office Visit    8 months ago Primary hypertension    Hannah Tavarez MD    Office Visit    10 months ago History of total knee arthroplasty, left    [facility] Liu Cano MD    Office Visit

## 2022-09-26 RX ORDER — ALENDRONATE SODIUM 70 MG/1
70 TABLET ORAL WEEKLY
Qty: 12 TABLET | Refills: 1 | Status: SHIPPED | OUTPATIENT
Start: 2022-09-26

## 2022-09-26 NOTE — TELEPHONE ENCOUNTER
Refill passed per [facility] protocol   Requested Prescriptions   Pending Prescriptions Disp Refills    ALENDRONATE 70 MG Oral Tab [Pharmacy Med Name: ALENDRONATE SODIUM 70 MG TAB] 12 tablet 1     Sig: TAKE 1 TABLET BY MOUTH ONCE A WEEK. Osteoporosis Medication Protocol Passed - 9/25/2022  7:43 AM        Passed - In person appointment or virtual visit in the past 12 mos or appointment in next 3 mos       Recent Outpatient Visits              1 month ago Acute pain of right shoulder    Sam Trammell MD    Office Visit    3 months ago Type 2 diabetes mellitus with complication, without long-term current use of insulin [facility])    [address] [place] [address].  [facility], [place] Edgard Subramanian DPM    Office Visit    6 months ago Encounter for The [place], [address] [place] [address] Rd,[address], [place]    Office Visit    9 months ago Primary hypertension    Kendal Marcos MD    Office Visit    11 months ago History of total knee arthroplasty, left    [facility] Marielos Dean MD    Office Visit

## 2022-10-05 RX ORDER — AMLODIPINE BESYLATE 10 MG/1
TABLET ORAL
Qty: 90 TABLET | Refills: 1 | Status: SHIPPED | OUTPATIENT
Start: 2022-10-05

## 2022-10-19 ENCOUNTER — HOSPITAL ENCOUNTER (OUTPATIENT)
Dept: MAMMOGRAPHY | Facility: HOSPITAL | Age: 72
Discharge: HOME | End: 2022-10-19
Attending: INTERNAL MEDICINE
Payer: MEDICARE

## 2022-10-19 DIAGNOSIS — Z12.31 ENCOUNTER FOR SCREENING MAMMOGRAM FOR MALIGNANT NEOPLASM OF BREAST: ICD-10-CM

## 2022-10-19 PROCEDURE — 77067 SCR MAMMO BI INCL CAD: CPT | Performed by: INTERNAL MEDICINE

## 2022-10-19 PROCEDURE — 77063 BREAST TOMOSYNTHESIS BI: CPT | Performed by: INTERNAL MEDICINE

## 2022-11-30 ENCOUNTER — TELEPHONE (OUTPATIENT)
Dept: INTERNAL MEDICINE CLINIC | Facility: CLINIC | Age: 72
End: 2022-11-30

## 2022-11-30 NOTE — TELEPHONE ENCOUNTER
Unable to reach patient to discuss statin use in cardiovascular disease. LVM for patient to call me back.

## 2022-12-07 LAB
ALBUMIN/GLOBULIN RATIO: 1.4 (CALC) (ref 1–2.5)
ALBUMIN: 4.1 G/DL (ref 3.6–5.1)
ALKALINE PHOSPHATASE: 72 U/L (ref 37–153)
ALT: 14 U/L (ref 6–29)
AST: 14 U/L (ref 10–35)
BILIRUBIN, TOTAL: 0.4 MG/DL (ref 0.2–1.2)
BUN: 16 MG/DL (ref 7–25)
CALCIUM: 9.4 MG/DL (ref 8.6–10.4)
CARBON DIOXIDE: 26 MMOL/L (ref 20–32)
CHLORIDE: 108 MMOL/L (ref 98–110)
CREATININE: 0.69 MG/DL (ref 0.6–1)
EGFR: 93 ML/MIN/1.73M2
GLOBULIN: 2.9 G/DL (CALC) (ref 1.9–3.7)
GLUCOSE: 96 MG/DL (ref 65–99)
POTASSIUM: 4 MMOL/L (ref 3.5–5.3)
PROTEIN, TOTAL: 7 G/DL (ref 6.1–8.1)
SODIUM: 140 MMOL/L (ref 135–146)

## 2022-12-07 PROCEDURE — 3044F HG A1C LEVEL LT 7.0%: CPT | Performed by: INTERNAL MEDICINE

## 2022-12-08 LAB
CHOL/HDLC RATIO: 3 (CALC)
CHOLESTEROL, TOTAL: 163 MG/DL
HDL CHOLESTEROL: 54 MG/DL
HEMOGLOBIN A1C: 5.6 % OF TOTAL HGB
LDL-CHOLESTEROL: 92 MG/DL (CALC)
NON-HDL CHOLESTEROL: 109 MG/DL (CALC)
TRIGLYCERIDES: 84 MG/DL

## 2022-12-22 RX ORDER — CLOPIDOGREL BISULFATE 75 MG/1
75 TABLET ORAL DAILY
Qty: 90 TABLET | Refills: 1 | Status: SHIPPED | OUTPATIENT
Start: 2022-12-22

## 2022-12-22 NOTE — TELEPHONE ENCOUNTER
Protocol failed or has No Protocol, please review  Medication has no protocol, med pended for your review/ approval     Listed as patient reported but on RX list       Medication List  As of 8/17/2022 10:57 AM    Alendronate Sodium 70 mg Oral Weekly    amLODIPine Besylate 10 mg Oral Daily    Aspirin 81 mg Oral Daily    Clopidogrel Bisulfate 75 mg Oral Daily    Cyanocobalamin 1,000 mg Oral Daily    Ferrous Sulfate 325 (65 Fe) MG TAKE 325 MG BY MOUTH DAILY. Magnesium,Magnesium Oxide Oral Daily    metFORMIN HCl 500 mg Oral Daily Before Dinner    methylPREDNISolone 4 MG As directed. Pravastatin Sodium 20 MG TAKE 1 TABLET BY MOUTH EVERYDAY AT BEDTIME    Quinapril HCl 20 mg Oral 2 times daily    Turmeric 500 MG 1 tablet Oral 2 times daily    Vitamin D,Cholecalciferol 5,000 Units Oral Daily    Future Appointments         Provider Department Appt Notes    In 6 days Magda Aguila MD [facility], [facility], [address] [place] [address] follow up - blood results           Recent Outpatient Visits              4 months ago Acute pain of right shoulder    Padmini Kim MD    Office Visit    6 months ago Type 2 diabetes mellitus with complication, without long-term current use of insulin [facility])    [facility], [facility].  [address], [place] Ollie Headings, [facility]    Office Visit    9 months ago Encounter for The Carlos ABran, [address] [place] [address] Rd,[address], [place]    Office Visit    1 year ago Primary hypertension    Samira Cote MD    Office Visit    1 year ago History of total knee arthroplasty, left    [facility] Judson Miner MD    Office Visit

## 2022-12-28 ENCOUNTER — MED REC SCAN ONLY (OUTPATIENT)
Dept: INTERNAL MEDICINE CLINIC | Facility: CLINIC | Age: 72
End: 2022-12-28

## 2022-12-28 ENCOUNTER — OFFICE VISIT (OUTPATIENT)
Dept: INTERNAL MEDICINE CLINIC | Facility: CLINIC | Age: 72
End: 2022-12-28
Payer: COMMERCIAL

## 2022-12-28 VITALS
HEART RATE: 72 BPM | SYSTOLIC BLOOD PRESSURE: 121 MMHG | TEMPERATURE: 98 F | BODY MASS INDEX: 45.45 KG/M2 | HEIGHT: 62 IN | WEIGHT: 247 LBS | DIASTOLIC BLOOD PRESSURE: 73 MMHG

## 2022-12-28 DIAGNOSIS — I10 PRIMARY HYPERTENSION: Primary | ICD-10-CM

## 2022-12-28 PROCEDURE — 3078F DIAST BP <80 MM HG: CPT | Performed by: INTERNAL MEDICINE

## 2022-12-28 PROCEDURE — 1126F AMNT PAIN NOTED NONE PRSNT: CPT | Performed by: INTERNAL MEDICINE

## 2022-12-28 PROCEDURE — 99213 OFFICE O/P EST LOW 20 MIN: CPT | Performed by: INTERNAL MEDICINE

## 2022-12-28 PROCEDURE — 3008F BODY MASS INDEX DOCD: CPT | Performed by: INTERNAL MEDICINE

## 2022-12-28 PROCEDURE — 3074F SYST BP LT 130 MM HG: CPT | Performed by: INTERNAL MEDICINE

## 2023-01-04 ENCOUNTER — TELEPHONE (OUTPATIENT)
Dept: INTERNAL MEDICINE CLINIC | Facility: CLINIC | Age: 73
End: 2023-01-04

## 2023-01-09 RX ORDER — QUINAPRIL 20 MG/1
20 TABLET ORAL 2 TIMES DAILY
Qty: 180 TABLET | Refills: 1 | Status: SHIPPED | OUTPATIENT
Start: 2023-01-09

## 2023-01-10 NOTE — TELEPHONE ENCOUNTER
Refill passed per [address] [place] [address] [address] protocol. .  Requested Prescriptions   Pending Prescriptions Disp Refills    QUINAPRIL HCL 20 MG Oral Tab [Pharmacy Med Name: QUINAPRIL 20 MG TABLET] 180 tablet 1     Sig: TAKE 1 TABLET BY MOUTH TWICE A DAY       Hypertensive Medications Protocol Passed - 1/8/2023  1:37 PM        Passed - In person appointment in the past 12 or next 3 months     Recent Outpatient Visits              1 week ago Primary hypertension    [facility]    Office Visit    4 months ago Acute pain of right shoulder    [facility]    Office Visit    7 months ago Type 2 diabetes mellitus with complication, without long-term current use of insulin [facility])    [address]. [facility], [place] Fifi Edwards DPM    Office Visit    9 months ago Encounter for The [place], [address] [place] [address],[address], [place]    Office Visit    1 year ago Primary hypertension    [address], Alexia Reagan, Iam Lanier MD    Office Visit                      Passed - Last BP reading less than 140/90     BP Readings from Last 1 Encounters:  12/28/22 : 121/73              Passed - CMP or BMP in past 6 months     Recent Results (from the past 4392 hour(s))   COMP METABOLIC PANEL (14)    Collection Time: 12/07/22 12:40 PM   Result Value Ref Range    GLUCOSE 96 65 - 99 mg/dL     Comment:               Fasting reference interval         UREA NITROGEN (BUN) 16 7 - 25 mg/dL    CREATININE 0.69 0.60 - 1.00 mg/dL    EGFR 93 > OR = 60 mL/min/1.73m2     Comment: The eGFR is based on the CKD-EPI 2021 equation. To calculate   the new eGFR from a previous Creatinine or Cystatin C  result, go to [employer].at. org/professionals/  [email or web address]/[email or web address]      BUN/CREATININE RATIO NOT APPLICABLE 6 - 22 (calc)    SODIUM 140 135 - 146 mmol/L    POTASSIUM 4.0 3.5 - 5.3 mmol/L    CHLORIDE 108 98 - 110 mmol/L    CARBON DIOXIDE 26 20 - 32 mmol/L    CALCIUM 9.4 8.6 - 10.4 mg/dL    PROTEIN, TOTAL 7.0 6.1 - 8.1 g/dL    ALBUMIN 4.1 3.6 - 5.1 g/dL    GLOBULIN 2.9 1.9 - 3.7 g/dL (calc)    ALBUMIN/GLOBULIN RATIO 1.4 1.0 - 2.5 (calc)    BILIRUBIN, TOTAL 0.4 0.2 - 1.2 mg/dL    ALKALINE PHOSPHATASE 72 37 - 153 U/L    AST 14 10 - 35 U/L    ALT 14 6 - 29 U/L     *Note: Due to a large number of results and/or encounters for the requested time period, some results have not been displayed. A complete set of results can be found in Results Review. Passed - In person appointment or virtual visit in the past 6 months     Recent Outpatient Visits              1 week ago Primary hypertension    Opal Corcoran MD    Office Visit    4 months ago Acute pain of right shoulder    Opal Corcoran MD    Office Visit    7 months ago Type 2 diabetes mellitus with complication, without long-term current use of insulin [facility])    [address] [facility], [place] Estuardo Suarez DPM    Office Visit    9 months ago Encounter for The [address], [place]    Office Visit    1 year ago Primary hypertension    Becca Renee MD    Office Visit                      Passed - EGFRCR or GFRAA > 50     GFR Evaluation  GFRAA: 102 , resulted on 3/25/2022               Recent Outpatient Visits              1 week ago Primary hypertension    Opal Corcoran MD    Office Visit    4 months ago Acute pain of right shoulder    Luis Rios MD    Office Visit    7 months ago Type 2 diabetes mellitus with complication, without long-term current use of insulin [facility])    [address] [facility].  [address], [place] Joanie Subramanian [place]    Office Visit    9 months ago [facility]counter for The [facility]    Arline Moreno [place]    Office Visit    1 year ago Primary hypertension    Angel Galdamez MD    Office Visit

## 2023-01-19 ENCOUNTER — TELEPHONE (OUTPATIENT)
Dept: INTERNAL MEDICINE CLINIC | Facility: CLINIC | Age: 73
End: 2023-01-19

## 2023-01-29 RX ORDER — PRAVASTATIN SODIUM 20 MG
TABLET ORAL
Qty: 90 TABLET | Refills: 1 | OUTPATIENT
Start: 2023-01-29

## 2023-02-03 NOTE — TELEPHONE ENCOUNTER
She needs to continue with same meds , conitnue to check bp Orbicularis Oris Muscle Flap Text: The defect edges were debeveled with a #15 scalpel blade.  Given that the defect affected the competency of the oral sphincter an orbicularis oris muscle flap was deemed most appropriate to restore this competency and normal muscle function.  Using a sterile surgical marker, an appropriate flap was drawn incorporating the defect. The area thus outlined was incised with a #15 scalpel blade. Following this, the designed flap was placed into the primary defect and sutured into place.

## 2023-02-08 ENCOUNTER — OFFICE VISIT (OUTPATIENT)
Dept: INTERNAL MEDICINE CLINIC | Facility: CLINIC | Age: 73
End: 2023-02-08

## 2023-02-08 VITALS
HEIGHT: 62 IN | DIASTOLIC BLOOD PRESSURE: 73 MMHG | BODY MASS INDEX: 44.9 KG/M2 | OXYGEN SATURATION: 98 % | TEMPERATURE: 97 F | HEART RATE: 69 BPM | SYSTOLIC BLOOD PRESSURE: 127 MMHG | WEIGHT: 244 LBS

## 2023-02-08 DIAGNOSIS — Z00.00 ENCOUNTER FOR ANNUAL HEALTH EXAMINATION: ICD-10-CM

## 2023-02-08 DIAGNOSIS — Z12.31 ENCOUNTER FOR SCREENING MAMMOGRAM FOR MALIGNANT NEOPLASM OF BREAST: ICD-10-CM

## 2023-02-08 DIAGNOSIS — E11.00 TYPE 2 DIABETES MELLITUS WITH HYPEROSMOLARITY WITHOUT COMA, WITHOUT LONG-TERM CURRENT USE OF INSULIN (HCC): ICD-10-CM

## 2023-02-08 DIAGNOSIS — Z00.00 ANNUAL PHYSICAL EXAM: Primary | ICD-10-CM

## 2023-02-08 LAB
CREAT UR-SCNC: 123 MG/DL
MICROALBUMIN UR-MCNC: 0.74 MG/DL
MICROALBUMIN/CREAT 24H UR-RTO: 6 UG/MG (ref ?–30)

## 2023-02-08 PROCEDURE — 82043 UR ALBUMIN QUANTITATIVE: CPT | Performed by: INTERNAL MEDICINE

## 2023-02-08 PROCEDURE — 82570 ASSAY OF URINE CREATININE: CPT | Performed by: INTERNAL MEDICINE

## 2023-03-10 RX ORDER — ALENDRONATE SODIUM 70 MG/1
70 TABLET ORAL WEEKLY
Qty: 12 TABLET | Refills: 1 | Status: SHIPPED | OUTPATIENT
Start: 2023-03-10

## 2023-03-10 NOTE — TELEPHONE ENCOUNTER
Refill passed per [place], [facility] protocol. Requested Prescriptions   Pending Prescriptions Disp Refills    ALENDRONATE 70 MG Oral Tab [Pharmacy Med Name: ALENDRONATE SODIUM 70 MG TAB] 12 tablet 1     Sig: Take 1 tablet (70 mg total) by mouth once a week.        Osteoporosis Medication Protocol Passed - 3/10/2023 12:23 AM        Passed - In person appointment or virtual visit in the past 12 mos or appointment in next 3 mos     Recent Outpatient Visits              1 month ago Annual physical exam    Fidencio Alicea MD    Office Visit    2 months ago Primary hypertension    Fidencio Alicea MD    Office Visit    6 months ago Acute pain of right shoulder    Fidencio Alicea MD    Office Visit    9 months ago Type 2 diabetes mellitus with complication, without long-term current use of insulin [facility]    Arlen Chambers, [address], [place] Noor[place]Jovan, DPCARI    Office Visit    11 months ago Encounter for annual health examination    Fidencio Alicea MD    Office Visit                         Recent Outpatient Visits              1 month ago Annual physical exam    Fidencio Alicea MD    Office Visit    2 months ago Primary hypertension    Fidencio Alicea MD    Office Visit    6 months ago Acute pain of right shoulder    Fidencio Alicea MD    Office Visit    9 months ago Type 2 diabetes mellitus with complication, without long-term current use of [facility]    [facility], [address], [place], Jovan Lang, DPM    Office Visit    11 months ago Encounter for annual health examination    Arlen Chambers, Haris Barbosa MD    Office Visit

## 2023-04-07 RX ORDER — AMLODIPINE BESYLATE 10 MG/1
10 TABLET ORAL DAILY
Qty: 90 TABLET | Refills: 3 | Status: SHIPPED | OUTPATIENT
Start: 2023-04-07

## 2023-04-07 NOTE — TELEPHONE ENCOUNTER
Refill passed per [place] [facility] protocol. CMP with GFR 12/2022  Requested Prescriptions   Pending Prescriptions Disp Refills    METFORMIN 500 MG Oral Tab [Pharmacy Med Name: METFORMIN  MG TABLET] 90 tablet 1     Sig: Take 1 tablet (500 mg total) by mouth Before Dinner.        Diabetes Medication Protocol Failed - 4/7/2023 12:29 AM        Failed - GFR in the past 12 months        Passed - Last A1C < 7.5 and within past 6 months     Lab Results   Component Value Date    A1C 5.6 12/07/2022             Passed - In person appointment or virtual visit in the past 6 mos or appointment in next 3 mos     Recent Outpatient Visits              1 month ago Annual physical exam    Katarina Saul MD    Office Visit    3 months ago Primary hypertension    Katarina Saul MD    Office Visit    7 months ago Acute pain of right shoulder    Katarina Saul MD    Office Visit    10 months ago Type 2 diabetes mellitus with complication, without long-term current use of insulin ([facility]    [address], [place] NoorlagVerena DPM    Office Visit    1 year ago Encounter for annual health examination    Katarina Saul MD    Office Visit                      Passed - EGFRCR or GFRAA > 50     GFR Evaluation              AMLODIPINE 10 MG Oral Tab [Pharmacy Med Name: AMLODIPINE BESYLATE 10 MG TAB] 90 tablet 1     Sig: TAKE 1 TABLET BY MOUTH EVERY DAY       Hypertensive Medications Protocol Passed - 4/7/2023 12:29 AM        Passed - In person appointment in the past 12 or next 3 months     Recent Outpatient Visits              1 month ago Annual physical exam    Katarina Saul MD    Office Visit    3 months ago [facility]    [facility] Visit    7 months ago Acute pain of right shoulder    [facility]    Office Visit    10 months ago Type 2 diabetes mellitus with complication, without long-term current use of insulin ([facility]    [facility], [place], Mckayla Grimm DPM    Office Visit    1 year ago Encounter for annual health examination    Ann Galloway MD    Office Visit                      Passed - Last BP reading less than 140/90     BP Readings from Last 1 Encounters:  02/08/23 : 127/73              Passed - CMP or BMP in past 6 months     Recent Results (from the past 4392 hour(s))   COMP METABOLIC PANEL (14)    Collection Time: 12/07/22 12:40 PM   Result Value Ref Range    GLUCOSE 96 65 - 99 mg/dL     Comment:               Fasting reference interval         UREA NITROGEN (BUN) 16 7 - 25 mg/dL    CREATININE 0.69 0.60 - 1.00 mg/dL    EGFR 93 > OR = 60 mL/min/1.73m2     Comment: The eGFR is based on the CKD-EPI 2021 equation. To calculate   the new eGFR from a previous Creatinine or Cystatin C  result, go to [employer]. org/professionals/  [email or web address]/[email or web address]      BUN/CREATININE RATIO NOT APPLICABLE 6 - 22 (calc)    SODIUM 140 135 - 146 mmol/L    POTASSIUM 4.0 3.5 - 5.3 mmol/L    CHLORIDE 108 98 - 110 mmol/L    CARBON DIOXIDE 26 20 - 32 mmol/L    CALCIUM 9.4 8.6 - 10.4 mg/dL    PROTEIN, TOTAL 7.0 6.1 - 8.1 g/dL    ALBUMIN 4.1 3.6 - 5.1 g/dL    GLOBULIN 2.9 1.9 - 3.7 g/dL (calc)    ALBUMIN/GLOBULIN RATIO 1.4 1.0 - 2.5 (calc)    BILIRUBIN, TOTAL 0.4 0.2 - 1.2 mg/dL    ALKALINE PHOSPHATASE 72 37 - 153 U/L    AST 14 10 - 35 U/L    ALT 14 6 - 29 U/L     *Note: Due to a large number of results and/or encounters for the requested time period, some results have not been displayed.  A complete set of results can be found in Results Review.                Passed - In person appointment or virtual visit in the past 6 months     Recent Outpatient Visits              1 month ago Annual physical exam    Dre Red MD    Office Visit    3 months ago Primary hypertension    Jeremie Trammell MD    Office Visit    7 months ago Acute pain of right shoulder    Jeremie Trammell MD    Office Visit    10 months ago Type 2 diabetes mellitus with complication, without long-term current use of insulin [facility]    [address], [place] No[place]lag[place] DPM    Office Visit    1 year ago Encounter for annual health examination    Jeremie Trammell MD    Office Visit                      Passed - EGFRCR or GFRAA > 50     GFR Evaluation

## 2023-04-28 NOTE — TELEPHONE ENCOUNTER
I put note
Pt notified of letter sent to [address] [place] [address] [address]
Pt would like a note to excuse from jury duty for June 7th . Pt said due to hardship and current stroke she is unable to go alone. Pt does not have anyone at this time to accompany her to jury duty. Pt would like note to be put in mycharts.   Please advise
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 Patient developed pain lifting weights in the gym and felt a pop. She was evaluated by her PMD and x-rays were obtained which were negative for fx. The pain is constant worse with internal rotation and lift. Reports radiation of pain into the neck, scapula and down the arm. He is taking Tylenol/Motrin for pain with some relief. Denies prior right shoulder pain, numbness or tingling. \par \par The patient's past medical history, past surgical history, medications and allergies were reviewed by me today with the patient and documented accordingly. In addition, the patient's family and social history, which were noncontributory to this visit, were reviewed also.

## 2023-06-06 ENCOUNTER — TELEPHONE (OUTPATIENT)
Dept: INTERNAL MEDICINE CLINIC | Facility: CLINIC | Age: 73
End: 2023-06-06

## 2023-06-16 PROCEDURE — 3044F HG A1C LEVEL LT 7.0%: CPT | Performed by: INTERNAL MEDICINE

## 2023-06-17 LAB
ABSOLUTE BASOPHILS: 22 CELLS/UL (ref 0–200)
ABSOLUTE EOSINOPHILS: 102 CELLS/UL (ref 15–500)
ABSOLUTE LYMPHOCYTES: 1020 CELLS/UL (ref 850–3900)
ABSOLUTE MONOCYTES: 384 CELLS/UL (ref 200–950)
ABSOLUTE NEUTROPHILS: 1572 CELLS/UL (ref 1500–7800)
BASOPHILS: 0.7 %
EOSINOPHILS: 3.3 %
HEMATOCRIT: 43.2 % (ref 35–45)
HEMOGLOBIN A1C: 5.6 % OF TOTAL HGB
HEMOGLOBIN: 13.9 G/DL (ref 11.7–15.5)
LYMPHOCYTES: 32.9 %
MCH: 27.4 PG (ref 27–33)
MCHC: 32.2 G/DL (ref 32–36)
MCV: 85.2 FL (ref 80–100)
MONOCYTES: 12.4 %
MPV: 10.8 FL (ref 7.5–12.5)
NEUTROPHILS: 50.7 %
PLATELET COUNT: 287 THOUSAND/UL (ref 140–400)
RDW: 13.9 % (ref 11–15)
RED BLOOD CELL COUNT: 5.07 MILLION/UL (ref 3.8–5.1)
WHITE BLOOD CELL COUNT: 3.1 THOUSAND/UL (ref 3.8–10.8)

## 2023-06-20 RX ORDER — CLOPIDOGREL BISULFATE 75 MG/1
75 TABLET ORAL DAILY
Qty: 90 TABLET | Refills: 3 | Status: SHIPPED | OUTPATIENT
Start: 2023-06-20

## 2023-06-20 NOTE — TELEPHONE ENCOUNTER
Please review; no protocol  Medication pended for your review and approval.     Requested Prescriptions   Pending Prescriptions Disp Refills    CLOPIDOGREL 75 MG Oral Tab [Pharmacy Med Name: CLOPIDOGREL 75 MG TABLET] 90 tablet 1     Sig: TAKE 1 TABLET BY MOUTH EVERY DAY       There is no refill protocol information for this order

## 2023-06-21 ENCOUNTER — OFFICE VISIT (OUTPATIENT)
Dept: PODIATRY CLINIC | Facility: CLINIC | Age: 73
End: 2023-06-21

## 2023-06-21 ENCOUNTER — OFFICE VISIT (OUTPATIENT)
Dept: INTERNAL MEDICINE CLINIC | Facility: CLINIC | Age: 73
End: 2023-06-21

## 2023-06-21 VITALS
HEIGHT: 62 IN | SYSTOLIC BLOOD PRESSURE: 106 MMHG | OXYGEN SATURATION: 99 % | TEMPERATURE: 97 F | DIASTOLIC BLOOD PRESSURE: 68 MMHG | BODY MASS INDEX: 44.9 KG/M2 | HEART RATE: 92 BPM | WEIGHT: 244 LBS

## 2023-06-21 DIAGNOSIS — R26.81 GAIT INSTABILITY: Primary | ICD-10-CM

## 2023-06-21 DIAGNOSIS — I10 PRIMARY HYPERTENSION: Primary | ICD-10-CM

## 2023-06-21 DIAGNOSIS — E11.8 TYPE 2 DIABETES MELLITUS WITH COMPLICATION, WITHOUT LONG-TERM CURRENT USE OF INSULIN (HCC): ICD-10-CM

## 2023-06-21 PROCEDURE — 99213 OFFICE O/P EST LOW 20 MIN: CPT | Performed by: INTERNAL MEDICINE

## 2023-06-21 PROCEDURE — 3008F BODY MASS INDEX DOCD: CPT | Performed by: INTERNAL MEDICINE

## 2023-06-21 PROCEDURE — 3074F SYST BP LT 130 MM HG: CPT | Performed by: INTERNAL MEDICINE

## 2023-06-21 PROCEDURE — 3078F DIAST BP <80 MM HG: CPT | Performed by: INTERNAL MEDICINE

## 2023-06-21 PROCEDURE — 99213 OFFICE O/P EST LOW 20 MIN: CPT | Performed by: PODIATRIST

## 2023-06-21 PROCEDURE — 1126F AMNT PAIN NOTED NONE PRSNT: CPT | Performed by: PODIATRIST

## 2023-06-21 PROCEDURE — 1159F MED LIST DOCD IN RCRD: CPT | Performed by: PODIATRIST

## 2023-06-21 PROCEDURE — 1125F AMNT PAIN NOTED PAIN PRSNT: CPT | Performed by: INTERNAL MEDICINE

## 2023-06-21 PROCEDURE — 1159F MED LIST DOCD IN RCRD: CPT | Performed by: INTERNAL MEDICINE

## 2023-06-27 RX ORDER — PRAVASTATIN SODIUM 20 MG
20 TABLET ORAL NIGHTLY
Qty: 90 TABLET | Refills: 1 | Status: SHIPPED | OUTPATIENT
Start: 2023-06-27

## 2023-11-30 RX ORDER — LISINOPRIL 20 MG/1
20 TABLET ORAL 2 TIMES DAILY
Qty: 180 TABLET | Refills: 1 | Status: SHIPPED | OUTPATIENT
Start: 2023-11-30

## 2023-11-30 NOTE — TELEPHONE ENCOUNTER
Please review; protocol failed. No active /future labs noted     Requested Prescriptions   Pending Prescriptions Disp Refills    LISINOPRIL 20 MG Oral Tab [Pharmacy Med Name: LISINOPRIL 20 MG TABLET] 180 tablet 1     Sig: TAKE 1 TABLET (20 MG TOTAL) BY MOUTH IN THE MORNING AND BEFORE BEDTIME       Hypertensive Medications Protocol Failed - 11/29/2023  9:33 PM        Failed - CMP or BMP in past 6 months     No results found for this or any previous visit (from the past 4392 hour(s)).             Passed - In person appointment in the past 12 or next 3 months     Recent Outpatient Visits              5 months ago Primary hypertension    [facility]    Office Visit    5 months ago Gait instability    [facility], [address], [place] Ulisses Capone [place]    Office Visit    9 months ago Annual physical exam    [address] [place], [place] [address]Papi mora MD    Office Visit    11 months ago Primary hypertension    [address] [place] [address], Papi Flores MD    Office Visit    1 year ago Acute pain of right shoulder    [address] [place], [place] [address]Papi mora MD    Office Visit          Future Appointments         Provider Department Appt Notes    In 2 weeks Tena Justice MD [address] [facility],[address], [address], [place] 6 month fu    In 6 months Ulisses Capone, [facility] - Last BP reading less than 140/90     BP Readings from Last 1 Encounters:   06/21/23 106/68               Passed - In person appointment or virtual visit in the past 6 months     Recent Outpatient Visits              5 months ago Primary hypertension    [facility]    Office Visit    5 months ago Gait instability    [facility], [address], [place] Primitivo Capone [place]    Office Visit    9 months ago Annual physical exam    [facility]    Office Visit    11 months ago Primary hypertension    [facility]    Office Visit    1 year ago Acute pain of right shoulder    [address] Valencia MD    Office Visit          Future Appointments         Provider Department Appt Notes    In 2 weeks Demarcus Rand MD [facility], [address] [facility],[address], [place] 6 month fu    In 6 months Primitivo Capone, DPM [facility], [address], [place] Annual-Diabetic Foot Care               Passed - [facility] or GFRAA > 50     GFR Evaluation  EGFRCR: 93 , resulted on 12/7/2022             Recent Outpatient Visits              5 months ago Primary hypertension    [address] Valencia MD    Office Visit    5 months ago Gait instability    [facility], [address], [place] Primitivo Capone, DPM    Office Visit    9 months ago Annual physical exam    [facility]    Office Visit    11 months ago Primary hypertension    [facility]    Office Visit    1 year ago Acute pain of right shoulder    [address] Kee MD    Office Visit          Future Appointments         Provider Department Appt Notes    In 2 weeks Demarcus Rand MD [address] [place],[address], [address], [place] 6 month fu    In 6 months Primitivo Capone, DPM [facility], [address] [place] [address] [address] [place]

## 2023-12-05 ENCOUNTER — TELEPHONE (OUTPATIENT)
Dept: INTERNAL MEDICINE CLINIC | Facility: CLINIC | Age: 73
End: 2023-12-05

## 2023-12-05 NOTE — TELEPHONE ENCOUNTER
Pt concerned about the information read on lisinopril side effects and wondering if she can talk with Dr Jam Butterfield about this on next appt 12/5/23 annual wellness visit? Currently no side effects. Advised pt to keep her appt and medication will be reviewed by MD and any other concerns. Tasked to Dr Milton Soto as update.   Please reply to pool: AMBER Francis

## 2023-12-20 ENCOUNTER — OFFICE VISIT (OUTPATIENT)
Dept: INTERNAL MEDICINE CLINIC | Facility: CLINIC | Age: 73
End: 2023-12-20

## 2023-12-20 VITALS
WEIGHT: 246 LBS | BODY MASS INDEX: 45.27 KG/M2 | HEIGHT: 62 IN | SYSTOLIC BLOOD PRESSURE: 119 MMHG | TEMPERATURE: 98 F | OXYGEN SATURATION: 100 % | DIASTOLIC BLOOD PRESSURE: 74 MMHG | HEART RATE: 64 BPM

## 2023-12-20 DIAGNOSIS — E11.00 TYPE 2 DIABETES MELLITUS WITH HYPEROSMOLARITY WITHOUT COMA, WITHOUT LONG-TERM CURRENT USE OF INSULIN (HCC): Primary | ICD-10-CM

## 2023-12-20 PROCEDURE — 1159F MED LIST DOCD IN RCRD: CPT | Performed by: INTERNAL MEDICINE

## 2023-12-20 PROCEDURE — 3078F DIAST BP <80 MM HG: CPT | Performed by: INTERNAL MEDICINE

## 2023-12-20 PROCEDURE — 3008F BODY MASS INDEX DOCD: CPT | Performed by: INTERNAL MEDICINE

## 2023-12-20 PROCEDURE — 1126F AMNT PAIN NOTED NONE PRSNT: CPT | Performed by: INTERNAL MEDICINE

## 2023-12-20 PROCEDURE — 99214 OFFICE O/P EST MOD 30 MIN: CPT | Performed by: INTERNAL MEDICINE

## 2023-12-20 PROCEDURE — 3074F SYST BP LT 130 MM HG: CPT | Performed by: INTERNAL MEDICINE

## 2023-12-20 RX ORDER — FERROUS SULFATE 325(65) MG
325 TABLET ORAL DAILY
Qty: 90 TABLET | Refills: 0 | Status: SHIPPED | OUTPATIENT
Start: 2023-12-20

## 2023-12-20 NOTE — PROGRESS NOTES
Subjective:     Patient ID: Krista Jurado is a 68year old female. Hypertension        History/Other:   Came in today for follow-up on her blood pressure and diabetes. Htn-is well-controlled. She is taking her medications regularly. She denies any complaints. dm symptoms check her blood sugar today but usually is well-controlled. She is due for hemoglobin A1c    She does not want flu shot  Review of Systems   Constitutional: Negative. HENT: Negative. Respiratory: Negative. Cardiovascular: Negative. Genitourinary: Negative. Musculoskeletal: Negative. Neurological: Negative. Hematological: Negative. Psychiatric/Behavioral: Negative. Current Outpatient Medications   Medication Sig Dispense Refill    Ferrous Sulfate 325 (65 Fe) MG Oral Tab Take 1 tablet (325 mg total) by mouth daily. 90 tablet 0    lisinopril 20 MG Oral Tab Take 1 tablet (20 mg total) by mouth 2 (two) times daily. 180 tablet 1    alendronate 70 MG Oral Tab Take 1 tablet (70 mg total) by mouth once a week. 12 tablet 1    pravastatin 20 MG Oral Tab Take 1 tablet (20 mg total) by mouth nightly. 90 tablet 1    clopidogrel 75 MG Oral Tab Take 1 tablet (75 mg total) by mouth daily. 90 tablet 3    metFORMIN 500 MG Oral Tab Take 1 tablet (500 mg total) by mouth Before Dinner. 90 tablet 3    amLODIPine 10 MG Oral Tab Take 1 tablet (10 mg total) by mouth daily. 90 tablet 3    ONETOUCH ULTRA In Vitro Strip USE ONCE DAILY 100 strip 3    aspirin 81 MG Oral Tab EC Take 1 tablet (81 mg total) by mouth daily. ONETOUCH DELICA PLUS [id number] Does not apply Misc USE TO TEST BLOOD SUGAR EVERY  each 3    Blood Glucose Monitoring Suppl (ONETOUCH ULTRA 2) w/Device Does not apply Kit Test daily 1 kit 0    MAGNESIUM OR Take by mouth daily. VITAMIN D OR Take 5,000 Units by mouth daily. Cyanocobalamin (VITAMIN B 12) 100 MCG Oral Lozenge Take 1,000 mg by mouth daily.       Turmeric Curcumin 500 MG Oral Cap Take 1 tablet by mouth 2 (two) times daily. Allergies:No Known Allergies    Past Medical History:   Diagnosis Date    Acute ischemic left MCA stroke ([facility] 01/14/2021    Acute ischemic stroke ([facility] 01/14/2021    Acute, but ill-defined, cerebrovascular disease 12/2020    Arthritis     Arthritis of carpometacarpal [place]) joint of right thumb 11/27/2018    Cancer ([facility] 2009    Uterine    Cancer determined by uterine cervix biopsy ([facility] 2009    Diabetes ([facility]     Essential hypertension     High blood pressure     High cholesterol     History of total knee arthroplasty, left 12/24/2019    Hyperlipidemia     Incontinence     bladder    Obesity     Osteoarthritis     Sleep apnea     no CPAP use    Visual impairment     readers      Past Surgical History:   Procedure Laterality Date    BRAIN SURGERY  2012    fluid leaking from brain     CARPAL TUNNEL RELEASE Bilateral 2000    COLONOSCOPY      COLONOSCOPY N/A 6/10/2019    Procedure: COLONOSCOPY;  Surgeon: Araseli Herrera MD;  Location: [address] [place] [address] ENDOSCOPY    COLONOSCOPY      HYSTERECTOMY  2009    KNEE SURGERY Left 12/13/2019    left total knee arthroplasty    SHOULDER ARTHROSCOPY  2010 and 2012    RCR      Family History   Problem Relation Age of Onset    Other (Other) Mother         SLE    Diabetes Father     Heart Disorder Father         CHF, Cardiac arrest    Cancer Self 63        uterine    Cancer Brother 72        pancreatic      Social History:   Social History     Socioeconomic History    Marital status: Single   Tobacco Use    Smoking status: Former     Packs/day: 0.00     Years: 40.00     Additional pack years: 0.00     Total pack years: 0.00     Types: Cigarettes    Smokeless tobacco: Never   Vaping Use    Vaping Use: Never used   Substance and Sexual Activity    Alcohol use: No    Drug use: No   Other Topics Concern    Caffeine Concern Yes    Exercise No        Objective:   Physical Exam  Vitals and nursing note reviewed.    Constitutional:       Appearance: Normal appearance. HENT:      Head: Normocephalic and atraumatic. Cardiovascular:      Rate and Rhythm: Normal rate and regular rhythm. Pulses: Normal pulses. Pulmonary:      Effort: Pulmonary effort is normal.      Breath sounds: Normal breath sounds. Abdominal:      Palpations: Abdomen is soft. Musculoskeletal:         General: Normal range of motion. Cervical back: Normal range of motion and neck supple. Skin:     General: Skin is warm. Neurological:      Mental Status: She is alert. Mental status is at baseline. Assessment & Plan:   1. Type 2 diabetes mellitus with hyperosmolarity without coma, without long-term current use of insulin ([facility])    Will check hemoglobin A1c today advised him to continue with her current medication watch diet avoid carbs    2. Hypertension controlled continue with current medication    Orders Placed This Encounter   Procedures    Hemoglobin A1C [E]    Basic Metabolic Panel (8) [E]       Meds This Visit:  Requested Prescriptions     Signed Prescriptions Disp Refills    Ferrous Sulfate 325 (65 Fe) MG Oral Tab 90 tablet 0     Sig: Take 1 tablet (325 mg total) by mouth daily.        Imaging & Referrals:  None

## 2023-12-27 LAB
BUN: 18 MG/DL (ref 7–25)
CALCIUM: 9.8 MG/DL (ref 8.6–10.4)
CARBON DIOXIDE: 26 MMOL/L (ref 20–32)
CHLORIDE: 106 MMOL/L (ref 98–110)
CREATININE: 0.75 MG/DL (ref 0.6–1)
EGFR: 85 ML/MIN/1.73M2
GLUCOSE: 98 MG/DL (ref 65–99)
HEMOGLOBIN A1C: 5.7 % OF TOTAL HGB
POTASSIUM: 4.3 MMOL/L (ref 3.5–5.3)
SODIUM: 141 MMOL/L (ref 135–146)

## 2024-01-17 ENCOUNTER — HOSPITAL ENCOUNTER (OUTPATIENT)
Dept: MAMMOGRAPHY | Age: 74
Discharge: HOME | End: 2024-01-17
Attending: INTERNAL MEDICINE
Payer: MEDICARE

## 2024-01-17 DIAGNOSIS — Z12.31 ENCOUNTER FOR SCREENING MAMMOGRAM FOR MALIGNANT NEOPLASM OF BREAST: ICD-10-CM

## 2024-01-17 PROCEDURE — 77063 BREAST TOMOSYNTHESIS BI: CPT | Performed by: INTERNAL MEDICINE

## 2024-01-17 PROCEDURE — 77067 SCR MAMMO BI INCL CAD: CPT | Performed by: INTERNAL MEDICINE

## 2024-01-17 RX ORDER — PRAVASTATIN SODIUM 20 MG
20 TABLET ORAL NIGHTLY
Qty: 90 TABLET | Refills: 3 | Status: SHIPPED | OUTPATIENT
Start: 2024-01-17

## 2024-01-17 NOTE — TELEPHONE ENCOUNTER
Protocol Failed/ No Protocol    Requested Prescriptions   Pending Prescriptions Disp Refills    PRAVASTATIN 20 MG Oral Tab [Pharmacy Med Name: PRAVASTATIN SODIUM 20 MG TAB] 90 tablet 1     Sig: TAKE 1 TABLET BY MOUTH EVERY DAY AT NIGHT       Cholesterol Medication Protocol Failed - 1/16/2024 12:17 AM        Failed - ALT in past 12 months        Failed - LDL in past 12 months        Failed - Last ALT < 80     Lab Results   Component Value Date    ALT 14 12/07/2022             Failed - Last LDL < 130     Lab Results   Component Value Date    LDL 92 12/07/2022             Passed - In person appointment or virtual visit in the past 12 mos or appointment in next 3 mos     Recent Outpatient Visits              4 weeks ago Type 2 diabetes mellitus with hyperosmolarity without coma, without long-term current use of insulin (HCC)    [facility]    Office Visit    7 months ago Primary hypertension    [facility] Niya Nam MD    Office Visit    7 months ago Gait instability    [facility], [address], [place] MeshAmador kyle DPM    Office Visit    11 months ago Annual physical exam    [facility]    Office Visit    1 year ago Primary hypertension    [facility] Niya Nam MD    Office Visit          Future Appointments         Provider Department Appt Notes    In 1 month Niya Alfred MD [facility] last px 2-8-23    In 5 months Amador Capone DPM [facility], [address], [place] Annual-Diabetic Foot Care    In 5 months Niya Alfred MD [facility]                     Future Appointments         Provider Department Appt Notes    In 1 month Niya Alfred MD [facility]  [facility] last px 2-8-23    In 5 months Amador Capone DPM [facility], [address], [place] Annual-Diabetic Foot Care    In 5 months Niya Alfred MD [facility]           Recent Outpatient Visits              4 weeks ago Type 2 diabetes mellitus with hyperosmolarity without coma, without long-term current use of insulin (HCC)    [facility], Niya Nam MD    Office Visit    7 months ago Primary hypertension    [facility] Niya Nam MD    Office Visit    7 months ago Gait instability    [facility], [address], [place] Amador Capone DPM    Office Visit    11 months ago Annual physical exam    [facility] Niya Nam MD    Office Visit    1 year ago Primary hypertension    [facility], Niya Nam MD    Office Visit

## 2024-02-16 RX ORDER — ALENDRONATE SODIUM 70 MG/1
70 TABLET ORAL WEEKLY
Qty: 12 TABLET | Refills: 1 | Status: SHIPPED | OUTPATIENT
Start: 2024-02-16

## 2024-02-16 NOTE — TELEPHONE ENCOUNTER
Please review. Protocol failed / No protocol.    Requested Prescriptions   Pending Prescriptions Disp Refills    ALENDRONATE 70 MG Oral Tab [Pharmacy Med Name: ALENDRONATE SODIUM 70 MG TAB] 12 tablet 1     Sig: Take 1 tablet (70 mg total) by mouth once a week.       Osteoporosis Medication Protocol Failed - 2/14/2024  1:55 AM        Failed - DEXA scan within past 2 years        Failed - CMP within the past 12 months        Passed - In person appointment or virtual visit in the past 6 mos or appointment in next 3 mos     Recent Outpatient Visits              1 month ago Type 2 diabetes mellitus with hyperosmolarity without coma, without long-term current use of insulin (HCC)    [facility]    [facility]    7 months ago [facility] hypertension    [facility]    Office Visit    7 months ago Gait instability    [facility], [address], [place] MeshAmador kyle, DPM    Office Visit    1 year ago Annual physical exam    [facility]    Office Visit    1 year ago Primary hypertension    [facility]    Office Visit          Future Appointments         Provider Department Appt Notes    In 6 days Niya Alfred MD [facility] last px 2-8-23    In 4 months Amador Capone, DPM [facility], [address], [place] Annual-Diabetic Foot Care    In 4 months Niya Alfred MD [facility]                Passed - Calcium level between 8.3 and 10.3     Lab Results   Component Value Date    CA 9.8 12/26/2023               Passed - GFR level greater than 35     GFR Evaluation  EGFRCR: 85 , resulted on 12/26/2023               Recent Outpatient Visits              1 month ago Type 2  diabetes mellitus with hyperosmolarity without coma, without long-term current use of insulin (HCC)    [facility] Niya Alfred MD    Office Visit    7 months ago Primary hypertension    [facility] Niya Nam MD    Office Visit    7 months ago Gait instability    [facility], [address], [place] MeshAmador kyle DPM    Office Visit    1 year ago Annual physical exam    [facility]    Office Visit    1 year ago Primary hypertension    [facility]    [facility]            Future Appointments         Provider Department Appt Notes    In 6 days Niya Alfred MD [facility] last px 2-8-23    In 4 months Amador Capone DPM [facility], [address], [place] Annual-Diabetic Foot Care    In 4 months Ni[facility] Alfred MD [facility]

## 2024-02-21 ENCOUNTER — OFFICE VISIT (OUTPATIENT)
Dept: INTERNAL MEDICINE CLINIC | Facility: CLINIC | Age: 74
End: 2024-02-21

## 2024-02-21 VITALS
TEMPERATURE: 98 F | BODY MASS INDEX: 45.45 KG/M2 | HEIGHT: 62 IN | DIASTOLIC BLOOD PRESSURE: 71 MMHG | OXYGEN SATURATION: 98 % | SYSTOLIC BLOOD PRESSURE: 107 MMHG | WEIGHT: 247 LBS | HEART RATE: 67 BPM

## 2024-02-21 DIAGNOSIS — E55.9 VITAMIN D DEFICIENCY: ICD-10-CM

## 2024-02-21 DIAGNOSIS — Z00.00 ANNUAL PHYSICAL EXAM: ICD-10-CM

## 2024-02-21 DIAGNOSIS — E11.00 TYPE 2 DIABETES MELLITUS WITH HYPEROSMOLARITY WITHOUT COMA, WITHOUT LONG-TERM CURRENT USE OF INSULIN (HCC): ICD-10-CM

## 2024-02-21 DIAGNOSIS — Z00.00 ENCOUNTER FOR ANNUAL HEALTH EXAMINATION: Primary | ICD-10-CM

## 2024-02-21 DIAGNOSIS — D12.4 ADENOMATOUS POLYP OF DESCENDING COLON: ICD-10-CM

## 2024-02-21 PROBLEM — I65.29 OCCLUSION AND STENOSIS OF UNSPECIFIED CAROTID ARTERY: Status: ACTIVE | Noted: 2024-02-21

## 2024-02-21 PROBLEM — G47.33 OSA (OBSTRUCTIVE SLEEP APNEA): Status: ACTIVE | Noted: 2024-02-21

## 2024-02-21 PROBLEM — R29.810 FACIAL DROOP: Status: ACTIVE | Noted: 2020-12-22

## 2024-02-21 NOTE — PROGRESS NOTES
Subjective:   Ivonne Israel is a 74 year old female who presents for a Medicare Subsequent Annual Wellness visit (Pt already had Initial Annual Wellness) and scheduled follow up of multiple significant but stable problems.       History/Other:   Fall Risk Assessment:   She has been screened for Falls and is low risk.      Cognitive Assessment:   She had a completely normal cognitive assessment - see flowsheet entries     Functional Ability/Status:   Ivonne Israel has some abnormal functions as listed below:  She has Walking problems based on screening of functional status.       Depression Screening (PHQ-2/PHQ-9): PHQ-2 SCORE: 0  , done 2/21/2024   Last [facility] Suicide Screening on 2/21/2024 was No Risk.     5 minutes spent screening and counseling for depression    Advanced Directives:   She has a Living Will on file in [place]; reviewed and discussed documents with patient (and family/surrogate if present).  She does have a POA but we do NOT have it on file in Epic.    Discussed Advance Care Planning with patient (and family/surrogate if present). Standard forms made available to patient in After Visit Summary.      Patient Active Problem List   Diagnosis    Spondylolysis, lumbar region    Essential hypertension    DM type 2 (diabetes mellitus, type 2) ([facility])    Obesity, Class III, BMI 40-49.9 (morbid obesity) ([facility])    Dyslipidemia    Osteoarthritis of fingers of hands, bilateral    Type 2 diabetes mellitus with complication, without long-term current use of insulin ([facility])    History of endometrial cancer    Osteoporosis    Arthritis    BMI 45.0-49.9, adult ([facility])    Postmenopausal atrophic vaginitis    Diabetes ([facility])    Aneurysm ([facility])    Osteoarthritis of right hip    Tortuous aorta ([facility])    Cerebral infarction due to thrombosis of left middle cerebral artery ([facility])    Stenosis of left carotid artery    Facial droop    Occlusion and stenosis of unspecified carotid artery    MINE (obstructive sleep apnea)      Allergies:  She has No Known Allergies.    Current Medications:  Outpatient Medications Marked as Taking for the 2/21/24 encounter (Office Visit) with Niya Alfred MD   Medication Sig    alendronate 70 MG Oral Tab Take 1 tablet (70 mg total) by mouth once a week.    pravastatin 20 MG Oral Tab Take 1 tablet (20 mg total) by mouth nightly.    Ferrous Sulfate 325 (65 Fe) MG Oral Tab Take 1 tablet (325 mg total) by mouth daily.    lisinopril 20 MG Oral Tab Take 1 tablet (20 mg total) by mouth 2 (two) times daily.    clopidogrel 75 MG Oral Tab Take 1 tablet (75 mg total) by mouth daily.    metFORMIN 500 MG Oral Tab Take 1 tablet (500 mg total) by mouth Before Dinner.    amLODIPine 10 MG Oral Tab Take 1 tablet (10 mg total) by mouth daily.    ONETOUCH ULTRA In Vitro Strip USE ONCE DAILY    aspirin 81 MG Oral Tab EC Take 1 tablet (81 mg total) by mouth daily.    ONETOUCH DELICA PLUS [id number] Does not apply Misc USE TO TEST BLOOD SUGAR EVERY DAY    Blood Glucose Monitoring Suppl (ONETOUCH ULTRA 2) w/Device Does not apply Kit Test daily    MAGNESIUM OR Take by mouth daily.      VITAMIN D OR Take 5,000 Units by mouth daily.      Cyanocobalamin (VITAMIN B 12) 100 MCG Oral Lozenge Take 1,000 mg by mouth daily.    Turmeric Curcumin 500 MG Oral Cap Take 1 tablet by mouth 2 (two) times daily.         Medical History:  She  has a past medical history of Acute ischemic left MCA stroke ([facility]) (01/14/2021), Acute ischemic stroke ([facility]) (01/14/2021), Acute, but ill-defined, cerebrovascular disease (12/2020), Arthritis, Arthritis of carpometacarpal (CMC) joint of right thumb (11/27/2018), Cancer ([facility]) (2009), Cancer determined by uterine cervix biopsy ([facility]) (2009), Diabetes ([facility]), Essential hypertension, High blood pressure, High cholesterol, History of total knee arthroplasty, left (12/24/2019), Hyperlipidemia, Incontinence, Obesity, Osteoarthritis, Sleep apnea, and Visual impairment.  Surgical History:  She  has a past surgical  history that includes hysterectomy (2009); shoulder arthroscopy (2010 and 2012); Brain Surgery (2012); colonoscopy; colonoscopy (N/A, 6/10/2019); carpal tunnel release (Bilateral, 2000); colonoscopy; and knee surgery (Left, 12/13/2019).   Family History:  Her family history includes Cancer (age of onset: 65) in her brother; Diabetes in her father; Heart Disorder in her father; Other in her mother; Pancreatic Cancer in her brother; Uterine Cancer in her self.  Social History:  She  reports that she has quit smoking. Her smoking use included cigarettes. She has never used smokeless tobacco. She reports that she does not drink alcohol and does not use drugs.    Tobacco:  She smoked tobacco in the past but quit greater than 12 months ago.  Social History    Tobacco Use      Smoking status: Former        Packs/day: 0.00        Years: 40.00        Additional pack years: 0.00        Total pack years: 0.00        Types: Cigarettes      Smokeless tobacco: Never       CAGE Alcohol Screen:   CAGE screening score of 0 on 2/19/2024, showing low risk of alcohol abuse.      Patient Care Team:  Niya Alfred MD as PCP - General (Internal Medicine)  Ramon Hector DO (Physical Medicine)  Sunita Conte OT as Occupational Therapist (Occupational Therapist)    Review of Systems  GENERAL: feels well otherwise  SKIN: denies any unusual skin lesions  EYES: denies blurred vision or double vision  HEENT: denies nasal congestion, sinus pain or ST  LUNGS: denies shortness of breath with exertion  CARDIOVASCULAR: denies chest pain on exertion  GI: denies abdominal pain, denies heartburn  : denies dysuria, vaginal discharge or itching, no complaint of urinary incontinence   MUSCULOSKELETAL: denies back pain  NEURO: denies headaches  PSYCHE: denies depression or anxiety  HEMATOLOGIC: denies hx of anemia  ENDOCRINE: denies thyroid history  ALL/ASTHMA: denies hx of allergy or asthma    Objective:   Physical Exam  General Appearance:   Alert, cooperative, no distress, appears stated age   Head:  Normocephalic, without obvious abnormality, atraumatic   Eyes:  PERRL, conjunctiva/corneas clear, EOM's intact both eyes   Ears:  Normal TM's and external ear canals, both ears   Nose: Nares normal, septum midline,mucosa normal, no drainage or sinus tenderness   Throat: Lips, mucosa, and tongue normal; teeth and gums normal   Neck: Supple, symmetrical, trachea midline, no adenopathy;  thyroid: not enlarged, symmetric, no tenderness/mass/nodules; no carotid bruit or JVD   Back:   Symmetric, no curvature, ROM normal, no CVA tenderness   Lungs:   Clear to auscultation bilaterally, respirations unlabored   Heart:  Regular rate and rhythm, S1 and S2 normal, no murmur, rub, or gallop   Abdomen:   Soft, non-tender, bowel sounds active all four quadrants,  no masses, no organomegaly   Pelvic: Deferred   Extremities: Extremities normal, atraumatic, no cyanosis or edema   Pulses: 2+ and symmetric   Skin: Skin color, texture, turgor normal, no rashes or lesions   Lymph nodes: Cervical, supraclavicular, and axillary nodes normal   Neurologic: Normal       /71 (BP Location: Right arm, Patient Position: Sitting, Cuff Size: large)   Pulse 67   Temp 97.7 °F (36.5 °C) (Temporal)   Ht 5' 2\" (1.575 m)   Wt 247 lb (112 kg)   SpO2 98%   BMI 45.18 kg/m²  Estimated body mass index is 45.18 kg/m² as calculated from the following:    Height as of this encounter: 5' 2\" (1.575 m).    Weight as of this encounter: 247 lb (112 kg).    Medicare Hearing Assessment:   Hearing Screening    Screening Method: Questionnaire  I have a problem hearing over the telephone: No I have trouble following the conversations when two or more people are talking at the same time: No   I have trouble understanding things on the TV: No I have to strain to understand conversations: No   I have to worry about missing the telephone ring or doorbell: No I have trouble hearing conversations in a  noisy background such as a crowded room or restaurant: No   I get confused about where sounds come from: No I misunderstand some words in a sentence and need to ask people to repeat themselves: No   I especially have trouble understanding the speech of women and children: No I have trouble understanding the speaker in a large room such as at a meeting or place of [place]: No   Many people I talk to seem to mumble (or don't speak clearly): No People get annoyed because I misunderstand what they say: No   I misunderstand what others are saying and make inappropriate responses: No I avoid social activities because I cannot hear well and fear I will reply improperly: No   Family members and friends have told me they think I may have hearing loss: No             Visual Acuity:     Right Eye Chart Acuity: 20/25     Left Eye Chart Acuity: 20/25     Both Eyes Chart Acuity: 20/25            Assessment & Plan:   Ivonne Israel is a 74 year old female who presents for a Medicare Assessment.     1. Type 2 diabetes mellitus with hyperosmolarity without coma, without long-term current use of insulin (HCC) (Primary) stable , recent HbA1c noted, will check  urine microalbumin creatinine ratio          2.Spondylolysis, lumbar region, stable , pain medication as needed        3   Essential hypertension -controlled continue with current medication             4.Dyslipidemia -stable,will check lipid panel      5. History of endometrial cancer - resolved       6 .Osteoporosis ,continue with the alendronate,will repeat bone scan         7 Tortuous aorta (HCC) continue with aspirin and statin     8  Acute ischemic stroke (HCC)  with minimal residual  Right sided weakness , continue with aspirin, statin and Plavix , stable     9  Cerebral infarction due to thrombosis of left middle cerebral artery (HCC) ,continue with aspirin Plavix and statin ,stable       10. Stenosis of left carotid status status post endarterectomy, she is following  with vascular surgery, stable     11. Sleep apnea continue  with  cpap     12 Postmenopausal atrophic vaginits ,stable     15 osteoporosis-continue with alendronate, will repeat bone density scan    16.  Obesity due to excess calories, advised for diet and exercise  There are no diagnoses linked to this encounter.  The patient indicates understanding of these issues and agrees to the plan.  Reinforced healthy diet, lifestyle, and exercise.      No follow-ups on file.     SHARA JHAVERI MD, 2/21/2024     Supplementary Documentation:   General Health:  In the past six months, have you lost more than 10 pounds without trying?: 2 - No  Has your appetite been poor?: No  Type of Diet: Balanced  How does the patient maintain a good energy level?: Appropriate Exercise  How would you describe your daily physical activity?: Light  How would you describe your current health state?: Good  How do you maintain positive mental well-being?: Social Interaction;Puzzles;Games;Visiting Family  On a scale of 0 to 10, with 0 being no pain and 10 being severe pain, what is your pain level?: 0 - (None)  In the past six months, have you experienced urine leakage?: 1-Yes  At any time do you feel concerned for the safety/well-being of yourself and/or your children, in your home or elsewhere?: No  Have you had any immunizations at another office such as Influenza, Hepatitis B, Tetanus, or Pneumococcal?: No       Ivonne Israel's SCREENING SCHEDULE   Tests on this list are recommended by your physician but may not be covered, or covered at this frequency, by your insurer.   Please check with your insurance carrier before scheduling to verify coverage.   PREVENTATIVE SERVICES FREQUENCY &  COVERAGE DETAILS LAST COMPLETION DATE   Diabetes Screening    Fasting Blood Sugar /  Glucose    One screening every 12 months if never tested or if previously tested but not diagnosed with pre-diabetes   One screening every 6 months if diagnosed with  pre-diabetes Lab Results   Component Value Date    GLU 98 12/26/2023        Cardiovascular Disease Screening    Lipid Panel  Cholesterol  Lipoprotein (HDL)  Triglycerides Covered every 5 years for all Medicare beneficiaries without apparent signs or symptoms of cardiovascular disease Lab Results   Component Value Date    CHOLEST 163 12/07/2022    HDL 54 12/07/2022    LDL 92 12/07/2022    TRIG 84 12/07/2022         Electrocardiogram (EKG)   Covered if needed at Welcome to Medicare, and non-screening if indicated for medical reasons 11/30/2019      Ultrasound Screening for Abdominal Aortic Aneurysm (AAA) Covered once in a lifetime for one of the following risk factors    Men who are 65-75 years old and have ever smoked    Anyone with a family history -     Colorectal Cancer Screening  Covered for ages 50-85; only need ONE of the following:    Colonoscopy   Covered every 10 years    Covered every 2 years if patient is at high risk or previous colonoscopy was abnormal 06/10/2019    Health Maintenance   Topic Date Due    Colorectal Cancer Screening  06/10/2024       Flexible Sigmoidoscopy   Covered every 4 years -    Fecal Occult Blood Test Covered annually -   Bone Density Screening    Bone density screening    Covered every 2 years after age 65 if diagnosed with risk of osteoporosis or estrogen deficiency.    Covered yearly for long-term glucocorticoid medication use (Steroids) Last Dexa Scan:    XR DEXA BONE DENSITOMETRY (CPT=77080) 08/19/2020      No recommendations at this time   Pap and Pelvic    Pap   Covered every 2 years for women at normal risk; Annually if at high risk -  No recommendations at this time    Chlamydia Annually if high risk -  No recommendations at this time   Screening Mammogram    Mammogram     Recommend annually for all female patients aged 40 and older    One baseline mammogram covered for patients aged 35-39 01/17/2024    Health Maintenance   Topic Date Due    Mammogram  01/17/2025        Immunizations    Influenza Covered once per flu season  Please get every year -  No recommendations at this time    Pneumococcal Each vaccine (Euioepx12 & Qlxqclism24) covered once after 65 Prevnar 13: 03/17/2022    [id number]: -     Pneumococcal Vaccination(2 of 2 - PPSV23 or PCV20) due on 05/12/2022    Hepatitis B One screening covered for patients with certain risk factors   -  No recommendations at this time    Tetanus Toxoid Not covered by Medicare Part B unless medically necessary (cut with metal); may be covered with your pharmacy prescription benefits -    Tetanus, Diptheria and Pertusis TD and TDaP Not covered by Medicare Part B -  No recommendations at this time    Zoster Not covered by Medicare Part B; may be covered with your pharmacy  prescription benefits -  Zoster Vaccines(1 of 2) Never done     Diabetes      Hemoglobin A1C Annually; if last result is elevated, may be asked to retest more frequently.    Medicare covers every 3 months Lab Results   Component Value Date     08/21/2020    A1C 5.7 (H) 12/26/2023       No recommendations at this time    Creat/alb ratio Annually Lab Results   Component Value Date    MICROALBCREA 6.0 02/08/2023       LDL Annually Lab Results   Component Value Date    LDL 92 12/07/2022       Dilated Eye Exam Annually Last Diabetic Eye Exam:  Last Dilated Eye Exam: 03/28/23  Eye Exam shows Diabetic Retinopathy?: No       Annual Monitoring of Persistent Medications (ACE/ARB, digoxin diuretics, anticonvulsants)    Potassium Annually Lab Results   Component Value Date    K 4.3 12/26/2023         Creatinine   Annually Lab Results   Component Value Date    CREATSERUM 0.75 12/26/2023         BUN Annually Lab Results   Component Value Date    BUN 18 12/26/2023       Drug Serum Conc Annually No results found for: \"DIGOXIN\", \"DIG\", \"VALP\"

## 2024-02-22 PROBLEM — E66.9 OBESITY: Status: ACTIVE | Noted: 2024-02-22

## 2024-02-22 PROBLEM — E66.01 OBESITY, CLASS III, BMI 40-49.9 (MORBID OBESITY) (HCC): Status: RESOLVED | Noted: 2017-01-25 | Resolved: 2024-02-22

## 2024-02-22 PROBLEM — R29.810 FACIAL DROOP: Status: RESOLVED | Noted: 2020-12-22 | Resolved: 2024-02-22

## 2024-02-22 PROBLEM — E66.9 OBESITY: Status: RESOLVED | Noted: 2024-02-22 | Resolved: 2024-02-22

## 2024-02-22 PROBLEM — E66.09 OBESITY DUE TO EXCESS CALORIES: Status: ACTIVE | Noted: 2024-02-22

## 2024-02-22 PROBLEM — E66.813 OBESITY, CLASS III, BMI 40-49.9 (MORBID OBESITY): Status: RESOLVED | Noted: 2017-01-25 | Resolved: 2024-02-22

## 2024-02-22 NOTE — PATIENT INSTRUCTIONS
Ivonne Israel's SCREENING SCHEDULE   Tests on this list are recommended by your physician but may not be covered, or covered at this frequency, by your insurer.   Please check with your insurance carrier before scheduling to verify coverage.   PREVENTATIVE SERVICES FREQUENCY &  COVERAGE DETAILS LAST COMPLETION DATE   Diabetes Screening    Fasting Blood Sugar /  Glucose    One screening every 12 months if never tested or if previously tested but not diagnosed with pre-diabetes   One screening every 6 months if diagnosed with pre-diabetes Lab Results   Component Value Date    GLU 98 12/26/2023        Cardiovascular Disease Screening    Lipid Panel  Cholesterol  Lipoprotein (HDL)  Triglycerides Covered every 5 years for all Medicare beneficiaries without apparent signs or symptoms of cardiovascular disease Lab Results   Component Value Date    CHOLEST 163 12/07/2022    HDL 54 12/07/2022    LDL 92 12/07/2022    TRIG 84 12/07/2022         Electrocardiogram (EKG)   Covered if needed at Welcome to Medicare, and non-screening if indicated for medical reasons 11/30/2019      Ultrasound Screening for Abdominal Aortic Aneurysm (AAA) Covered once in a lifetime for one of the following risk factors   • Men who are 65-75 years old and have ever smoked   • Anyone with a family history -     Colorectal Cancer Screening  Covered for ages 50-85; only need ONE of the following:    Colonoscopy   Covered every 10 years    Covered every 2 years if patient is at high risk or previous colonoscopy was abnormal 06/10/2019    Health Maintenance   Topic Date Due   • Colorectal Cancer Screening  06/10/2024       Flexible Sigmoidoscopy   Covered every 4 years -    Fecal Occult Blood Test Covered annually -   Bone Density Screening    Bone density screening    Covered every 2 years after age 65 if diagnosed with risk of osteoporosis or estrogen deficiency.    Covered yearly for long-term glucocorticoid medication use (Steroids) Last Dexa  Scan:    XR DEXA BONE DENSITOMETRY (CPT=77080) 08/19/2020      No recommendations at this time   Pap and Pelvic    Pap   Covered every 2 years for women at normal risk; Annually if at high risk -  No recommendations at this time    Chlamydia Annually if high risk -  No recommendations at this time   Screening Mammogram    Mammogram     Recommend annually for all female patients aged 40 and older    One baseline mammogram covered for patients aged 35-39 01/17/2024    Health Maintenance   Topic Date Due   • Mammogram  01/17/2025       Immunizations    Influenza Covered once per flu season  Please get every year -  No recommendations at this time    Pneumococcal Each vaccine ([id number] & [id number]) covered once after 65 Prevnar 13: 03/17/2022    [id number]: -     No recommendations at this time    Hepatitis B One screening covered for patients with certain risk factors   -  No recommendations at this time    Tetanus Toxoid Not covered by Medicare Part B unless medically necessary (cut with metal); may be covered with your pharmacy prescription benefits -    Tetanus, Diptheria and Pertusis TD and TDaP Not covered by Medicare Part B -  No recommendations at this time    Zoster Not covered by Medicare Part B; may be covered with your pharmacy  prescription benefits -  Zoster Vaccines(1 of 2) Never done     Diabetes      Hemoglobin A1C Annually; if last result is elevated, may be asked to retest more frequently.    Medicare covers every 3 months Lab Results   Component Value Date     08/21/2020    A1C 5.7 (H) 12/26/2023       No recommendations at this time    Creat/alb ratio Annually Lab Results   Component Value Date    MICROALBCREA 6.0 02/08/2023       LDL Annually Lab Results   Component Value Date    LDL 92 12/07/2022       Dilated Eye Exam Annually Last Diabetic Eye Exam:  Last Dilated Eye Exam: 03/28/23  Eye Exam shows Diabetic Retinopathy?: No       Annual Monitoring of Persistent Medications (ACE/ARB,  digoxin diuretics, anticonvulsants)    Potassium Annually Lab Results   Component Value Date    K 4.3 12/26/2023         Creatinine   Annually Lab Results   Component Value Date    CREATSERUM 0.75 12/26/2023         BUN Annually Lab Results   Component Value Date    BUN 18 12/26/2023       Drug Serum Conc Annually No results found for: \"DIGOXIN\", \"DIG\", \"VALP\"

## 2024-02-27 LAB
CHOL/HDLC RATIO: 2.6 (CALC)
CHOLESTEROL, TOTAL: 174 MG/DL
CREATININE, RANDOM URINE: 55 MG/DL (ref 20–275)
HDL CHOLESTEROL: 67 MG/DL
LDL-CHOLESTEROL: 88 MG/DL (CALC)
MICROALBUMIN/CREATININE RATIO, RANDOM URINE: 9 MCG/MG CREAT
MICROALBUMIN: 0.5 MG/DL
NON-HDL CHOLESTEROL: 107 MG/DL (CALC)
TRIGLYCERIDES: 96 MG/DL
TSH W/REFLEX TO FT4: 1.37 MIU/L (ref 0.4–4.5)

## 2024-03-11 ENCOUNTER — TELEPHONE (OUTPATIENT)
Facility: CLINIC | Age: 74
End: 2024-03-11

## 2024-03-11 RX ORDER — GLUCOSA SU 2KCL/CHONDROITIN SU 500-400 MG
1 CAPSULE ORAL DAILY
COMMUNITY

## 2024-03-11 NOTE — TELEPHONE ENCOUNTER
David Kelly MD  6/25/2019  6:39 PM CDT       I wanted to get back to you with your colonoscopy results.  You had 3 colon polyps removed which were benign.  I would advise a repeat colonoscopy in 5 years to make sure no new polyps are forming.   You also have internal hemorrhoids and diverticulosis.  In addition you have a lipoma ( benign fatty collection on the colon).     Call with any questions.   Collected 6/10/2019 12:44 PM       Status: Final result       Visible to patient: Yes (seen)       Dx: Screen for colon cancer    5 Result Notes       1 Patient Communication        Component  Ref Range & Units      Case Report     Surgical Pathology                                Case: [id number]                                     Authorizing Provider:  David Kelly MD       Collected:           06/10/2019 12:44 PM             Ordering Location:     [facility]          Received:            06/10/2019 01:44 PM                                    Endoscopy Lab Suites                                                           Pathologist:           Macy Nichols MD                                                               Specimens:   A) - Colon transverse, polyp                                                                          B) - Colon ascending, polyp                                                                            C) - Rectum, polyp                                                                            Final Diagnosis:        A. Transverse colon polyp:  Tubular adenoma.     B. Ascending colon polyp:  Fragments of tubular adenoma.     C. Rectum polyp:   Fragments of hyperplastic polyp.        Electronically signed by Macy Nichols MD on 6/11/2019 at  9:43 AM        Clinical Information      Z12.11 Screen For Colon Cancer.          Gross Description      Specimen A is submitted in formalin labeled “Jhonatan, transverse colon polyp” and consists of a 0.2 x 0.1 x 0.2 cm pink-tan,  soft tissue fragment. The specimen is entirely submitted in cassette A1.     Specimen B is submitted in formalin labeled “Muhlenberg, ascending colon polyp” and consists of tan, soft tissue fragments ranging from 0.2 - 0.3 cm in greatest dimension and measuring 0.3 x 0.2 x 0.2 cm in aggregate. The specimen is entirely submitted in cassette B1.     Specimen C is submitted in formalin labeled “Muhlenberg, rectum polyp” and consists of soft tissue fragments ranging from 0.2 - 0.3 cm in greatest dimension and measuring 0.3 x 0.3 x 0.2 cm in aggregate. The specimen is entirely submitted in cassette C1. (al)     Macy Nichols M.D./[facility]           Interpretation     Abnormal Abnormal      Electronically signed by Macy Nichols MD on 6/11/2019 at  9:43 AM        David Kelly MD   Physician  Gastroenterology     Operative Report  Signed     Date of Service: 6/10/2019  1:02 PM  Case Time: Procedures: Surgeons:   6/10/2019 12:42 PM COLONOSCOPY    David Kelly MD               Signed         [facility] Endoscopy Report        Preoperative Diagnosis:  - colon cancer screening     Postoperative Diagnosis:  - colon polyps x 3  - pan-diverticular disease  - internal hemorrhoids  - lipoma 1.2 cm ascending colon     Procedure:    Colonoscopy      Surgeon:  David Kelly M.D.     Anesthesia:  MAC sedation, see anesthesia records.       Technique:  After informed consent, the patient was placed in the left lateral recumbent position.  Digital rectal examination revealed no palpable intraluminal abnormalities.  An Olympus variable stiffness 190 series HD colonoscope was inserted into the rectum and advanced under direct vision by following the lumen to the cecum.  The colon was examined upon withdrawal in the left lateral position.     The procedure was well tolerated without immediate complication.        Findings:  The preparation of the colon was good.  The terminal ileum was examined for 4 cm and visually normal.   The ileocecal valve was well preserved. The visualized colonic mucosa from the cecum to the anal verge was normal with an intact vascular pattern.     Colon polyps x3 removed as follows;  -Ascending colon x1, sessile approximately 7 mm in size removed by cold snare technique.  -Transverse colon x1, sessile 3 to 4 mm in size and removed by cold snare technique.  -Rectal x1, sessile 5 mm in size and removed by cold snare technique.  All polypectomy sites were inspected and found to be free of bleeding and specimens retrieved and sent for analysis.     Pandiverticular disease was noted.  No evidence of diverticulitis     Lipoma was noted in the proximal ascending colon, submucosal yellowish lesion which was soft on probing.  This is approximate 1.2 cm in size.     Small internal hemorrhage noted on retroflexed view.       Impression:  - colon polyps x 3  - pan-diverticular disease  - internal hemorrhoids  - lipoma 1.2 cm ascending colon     Recommendations:  - Post polypectomy instructions given  - Repeat colonoscopy in 3- 5 years  - High fiber diet for diverticular disease  - Symptomatic treatment of hemorrhoids              David Kelly MD  6/10/2019  1:02 PM

## 2024-03-11 NOTE — TELEPHONE ENCOUNTER
Dr. Kelly    Patient called to schedule 5 year recall colonoscopy.  Please provide orders if ok to schedule directly.    Thank you    Last Procedure, Date, MD:  Colonoscopy Dr. Kelly 6/10/2019  Last Diagnosis:  3 colon polyps, diverticulosis, hemorrhoids, lipoma  Recalled (mth/yrs): 5 years  Sedation Used Previously:  MAC  Last Prep Used (if known):  Colyte  Quality Of Prep (if known): good  Anticoagulants: Plavix  Diabetic Med's (PO/Injectables): metformin  Weight loss Med's: no  Iron/Herbal/Multivitamin Supplements (RX/OTC): vitamin B 12, ferrous sulfate, Vitamin D, CoQ10, Tumeric  Marijuana/Vaping/CBD: no  Height & Weight: 5'2\" 247 lbs  BMI: 45.17  Hx of Cardiac/CVA Issues/(MI/Stroke): stroke 2020  Devices Pacemaker/Defibrillator/Stents: no  Respiratory Issues/Oxygen Use/MINE/COPD: sleep apnea does not use cpap  Issues w/ Anesthesia: no    Symptoms (Y/N): no  Symptoms Details: n/a    Special Comments/Notes: n/a    Please advise on orders and prep.     Thank you!

## 2024-03-11 NOTE — TELEPHONE ENCOUNTER
Colonoscopy for colon cancer screening, hx colon polyps   - Golytely   - MAC    Hold plavix 5 days before and hold supplements 7 days before.     Metformin day before to resume after procedure.

## 2024-03-15 NOTE — TELEPHONE ENCOUNTER
Scheduled for:  Colonoscopy 36529  Provider Name: Dr. Kelly  Date: 9/27/2024  Location:  [facility]  Sedation:  MAC  Time: 2:45 PM (patient is aware arrival time is 1:45 PM)    Prep: Golytely  Meds/Allergies Reconciled?:  Physician reviewed   Diagnosis with codes:  Colon cancer screening Z12.11; Hx: Colon polyps Z86.010  Was patient informed to call insurance with codes (Y/N): Yes, I confirmed [employer] insurance with the patient.  Referral sent?: Referral was sent at the time of electronic surgical scheduling.   [facility] or [facility] notified?:  I sent an electronic request to Endo Scheduling and received a confirmation today.   Medication Orders: Hold Metformin the day before and day of procedure. Hold Plavix 5 days prior to procedure. Hold multivitamins/supplements one week prior to procedure.  Misc Orders: N/A     Further instructions given by staff: I discussed the prep instructions with the patient which she verbally understood and is aware that I will send the instructions today.

## 2024-03-20 DIAGNOSIS — E11.00 TYPE 2 DIABETES MELLITUS WITH HYPEROSMOLARITY WITHOUT COMA, WITHOUT LONG-TERM CURRENT USE OF INSULIN (HCC): ICD-10-CM

## 2024-03-21 RX ORDER — FERROUS SULFATE 325(65) MG
1 TABLET ORAL DAILY
Qty: 90 TABLET | Refills: 0 | Status: SHIPPED | OUTPATIENT
Start: 2024-03-21

## 2024-03-27 RX ORDER — AMLODIPINE BESYLATE 10 MG/1
10 TABLET ORAL DAILY
Qty: 90 TABLET | Refills: 3 | Status: SHIPPED | OUTPATIENT
Start: 2024-03-27

## 2024-03-27 NOTE — TELEPHONE ENCOUNTER
Refill Per Protocol     Requested Prescriptions   Pending Prescriptions Disp Refills    AMLODIPINE 10 MG Oral Tab [Pharmacy Med Name: AMLODIPINE BESYLATE 10 MG TAB] 90 tablet 3     Sig: TAKE 1 TABLET BY MOUTH EVERY DAY       Hypertension Medications Protocol Passed - 3/26/2024 12:42 AM        Passed - CMP or BMP in past 12 months        Passed - Last BP reading less than 140/90     BP Readings from Last 1 Encounters:   02/21/24 107/71               Passed - In person appointment or virtual visit in the past 12 mos or appointment in next 3 mos     Recent Outpatient Visits              1 month ago Encounter for annual health examination    [facility]    Office Visit    3 months ago Type 2 diabetes mellitus with hyperosmolarity without coma, without long-term current use of insulin (HCC)    [facility]    [facility] Visit    9 months ago Primary hypertension    [facility] Niya Nam MD    Office Visit    9 months ago Gait instability    [facility] [address], [place] Amador Capone DPM    Office Visit    1 year ago Annual physical exam    [facility]    Office Visit          Future Appointments         Provider Department Appt Notes    In 3 months Amador Capone DPM [facility] [address], [place] Annual-Diabetic Foot Care    In 3 months Niya Alfred MD [facility]     In 6 months KAEL QUEEN [facility] CLN w/MAC @ [facility]               Passed - EGFRCR or GFRAA > 50     GFR Evaluation  EGFRCR: 85 , resulted on 12/26/2023                 Future Appointments         Provider Department Appt Notes    In 3 months Amador Capone DPM [facility],  [address], [place] Annual-Diabetic Foot Care    In 3 months [facility]     In 6 months [facility] @ [facility]          Recent Outpatient Visits              1 month ago Encounter for annual health examination    [facility] [place] [facility] [facility]    Office Visit    3 months ago Type 2 diabetes mellitus with hyperosmolarity without coma, without long-term current use of insulin (HCC)    [facility], Niya Nam MD    Office Visit    9 months ago Primary hypertension    [facility] Niya Nam MD    Office Visit    9 months ago Gait instability    [facility] [address], [place] Amador Capone DPM    Office Visit    1 year ago Annual physical exam    [facility], Niya Nam MD    Office Visit

## 2024-04-12 NOTE — TELEPHONE ENCOUNTER
REFILL PASSED PER [facility] PROTOCOLS    Requested Prescriptions   Pending Prescriptions Disp Refills    METFORMIN 500 MG Oral Tab [Pharmacy Med Name: METFORMIN  MG TABLET] 90 tablet 3     Sig: Take 1 tablet (500 mg total) by mouth Before Dinner.       Diabetes Medication Protocol Passed - 4/12/2024 12:50 AM        Passed - Last A1C < 7.5 and within past 6 months     Lab Results   Component Value Date    A1C 5.7 (H) 12/26/2023             Passed - In person appointment or virtual visit in the past 6 mos or appointment in next 3 mos     Recent Outpatient Visits              1 month ago Encounter for annual health examination    [facility]    Office Visit    3 months ago Type 2 diabetes mellitus with hyperosmolarity without coma, without long-term current use of insulin (HCC)    [facility]    Office Visit    9 months ago Primary hypertension    [facility]    Office Visit    9 months ago Gait instability    [facility] [address], [place] Amador Capone DPM    Office Visit    1 year ago Annual physical exam    [facility] [place]    Office Visit          Future Appointments         Provider Department Appt Notes    In 2 months Amador Capone DPM [facility] [address], [place] Annual-Diabetic Foot Care    In 2 months Niya Alfred MD [facility]     In 5 months QUEEN, PROCEDURE [facility] CLN jef/MAC @ [facility]                    Passed - Microalbumin procedure in past 12 months or taking ACE/ARB        Passed - EGFRCR or GFRAA > 50     GFR Evaluation  EGFRCR: 85 , resulted on 12/26/2023          Passed - GFR in the past 12 months             Future  Appointments         Provider Department Appt Notes    In 2 months Amador Capone DPM [facility] [address], [place] Annual-Diabetic Foot Care    In 2 months Niya Alfred MD [facility]     In 5 months KAEL QUEEN [facility] CLN w/MAC @ [facility]          Recent Outpatient Visits              1 month ago Encounter for annual health examination    [facility]    Office Visit    3 months ago Type 2 diabetes mellitus with hyperosmolarity without coma, without long-term current use of insulin (HCC)    [facility]    Office Visit    9 months ago Primary hypertension    [facility] Niya Nam MD    Office Visit    9 months ago Gait instability    [facility] [address], [place] Amador Capone DPM    Office Visit    1 year ago Annual physical exam    [facility]    [facility] Visit

## 2024-05-28 RX ORDER — LISINOPRIL 20 MG/1
20 TABLET ORAL 2 TIMES DAILY
Qty: 180 TABLET | Refills: 3 | Status: SHIPPED | OUTPATIENT
Start: 2024-05-28

## 2024-05-28 NOTE — TELEPHONE ENCOUNTER
Refill passed per [facility] protocol.  Requested Prescriptions   Pending Prescriptions Disp Refills    LISINOPRIL 20 MG Oral Tab [Pharmacy Med Name: LISINOPRIL 20 MG TABLET] 180 tablet 1     Sig: TAKE 1 TABLET BY MOUTH TWICE A DAY       Hypertension Medications Protocol Passed - 5/24/2024 12:21 AM        Passed - CMP or BMP in past 12 months        Passed - Last BP reading less than 140/90     BP Readings from Last 1 Encounters:   02/21/24 107/71               Passed - In person appointment or virtual visit in the past 12 mos or appointment in next 3 mos     Recent Outpatient Visits              3 months ago Encounter for annual health examination    [facility]    Office Visit    5 months ago Type 2 diabetes mellitus with hyperosmolarity without coma, without long-term current use of insulin (HCC)    [facility]    [facility]    11 months ago Primary hypertension    [facility]    Office Visit    11 months ago Gait instability    [facility] [address], [place] Amador Capone DPM    Office Visit    1 year ago Annual physical exam    [facility]    [facility] Visit          Future Appointments         Provider Department Appt Notes    In 3 weeks Amador Capone DPM [facility] [address], [place] -Annual-Diabetic Foot Care    In 4 weeks Niya Alfred MD [facility]     In 4 months KAEL QUEEN [facility] CLN w/MAC @ [facility]                    Passed - EGFRCR or GFRAA > 50     GFR Evaluation  EGFRCR: 85 , resulted on 12/26/2023             Recent Outpatient Visits              3 months ago Encounter for annual health examination    [place]  [facility] Niya Nam MD    Office Visit    5 months ago Type 2 diabetes mellitus with hyperosmolarity without coma, without long-term current use of insulin (HCC)    [facility] Niya Nam MD    Office Visit    11 months ago Primary hypertension    [facility] Niya Nam MD    Office Visit    11 months ago Gait instability    [facility] [address], [place] Amador Capone DPM    Office Visit    1 year ago Annual physical exam    [facility]    [facility]          Future Appointments         Provider Department Appt Notes    In 3 weeks Amador Capone DPM [facility] [address], [place] -Annual-Diabetic Foot Care    In 4 weeks Niya Alfred MD [facility]     In 4 months KAEL QUEEN [facility] CLN w/MAC @ [facility]

## 2024-06-12 ENCOUNTER — OFFICE VISIT (OUTPATIENT)
Dept: PODIATRY CLINIC | Facility: CLINIC | Age: 74
End: 2024-06-12
Payer: MEDICARE

## 2024-06-12 DIAGNOSIS — R26.81 GAIT INSTABILITY: Primary | ICD-10-CM

## 2024-06-12 DIAGNOSIS — E11.8 TYPE 2 DIABETES MELLITUS WITH COMPLICATION, WITHOUT LONG-TERM CURRENT USE OF INSULIN (HCC): ICD-10-CM

## 2024-06-12 PROCEDURE — 99213 OFFICE O/P EST LOW 20 MIN: CPT | Performed by: PODIATRIST

## 2024-06-12 RX ORDER — CLOPIDOGREL BISULFATE 75 MG/1
75 TABLET ORAL DAILY
Qty: 90 TABLET | Refills: 3 | Status: SHIPPED | OUTPATIENT
Start: 2024-06-12

## 2024-06-12 NOTE — PROGRESS NOTES
[facility] Podiatry  Progress Note    Ivonne Israel is a 74 year old female.   Chief Complaint   Patient presents with    Diabetic Foot Care     Annual diabetic foot care f/u.on 12/26/23 A1C=5.7., Patient did not check BS.         HPI:     This is a pleasant female with PMH of CAD, type 2 DM, lumbar spondylolysis. She is on plavix.  She does use a cane for ambulation.    She presents to clinic today for diabetic foot care.           Allergies: Patient has no known allergies.   Current Outpatient Medications   Medication Sig Dispense Refill    clopidogrel 75 MG Oral Tab Take 1 tablet (75 mg total) by mouth daily. 90 tablet 3    lisinopril 20 MG Oral Tab Take 1 tablet (20 mg total) by mouth 2 (two) times daily. 180 tablet 3    metFORMIN 500 MG Oral Tab Take 1 tablet (500 mg total) by mouth Before Dinner. 90 tablet 3    amLODIPine 10 MG Oral Tab Take 1 tablet (10 mg total) by mouth daily. 90 tablet 3    Ferrous Sulfate 325 (65 Fe) MG Oral Tab Take 1 tablet (325 mg total) by mouth daily. 90 tablet 0    Coenzyme Q10 (CO Q10) 100 MG Oral Cap Take 1 tablet by mouth daily.      alendronate 70 MG Oral Tab Take 1 tablet (70 mg total) by mouth once a week. 12 tablet 1    pravastatin 20 MG Oral Tab Take 1 tablet (20 mg total) by mouth nightly. 90 tablet 3    ONETOUCH ULTRA In Vitro Strip USE ONCE DAILY 100 strip 3    aspirin 81 MG Oral Tab EC Take 1 tablet (81 mg total) by mouth daily.      ONETOUCH DELICA PLUS [id number] Does not apply Misc USE TO TEST BLOOD SUGAR EVERY  each 3    Blood Glucose Monitoring Suppl (ONETOUCH ULTRA 2) w/Device Does not apply Kit Test daily 1 kit 0    MAGNESIUM OR Take by mouth daily.        VITAMIN D OR Take 5,000 Units by mouth daily.        Cyanocobalamin (VITAMIN B 12) 100 MCG Oral Lozenge Take 1,000 mg by mouth daily.      Turmeric Curcumin 500 MG Oral Cap Take 1 tablet by mouth 2 (two) times daily.          Past Medical History:    Acute ischemic left MCA stroke (HCC)    Acute  ischemic stroke (HCC)    Acute, but ill-defined, cerebrovascular disease    Arthritis    Arthritis of carpometacarpal (CMC) joint of right thumb    Cancer (HCC)    Uterine    Cancer determined by uterine cervix biopsy (HCC)    Diabetes (HCC)    Essential hypertension    High blood pressure    High cholesterol    History of total knee arthroplasty, left    Hyperlipidemia    Incontinence    bladder    Obesity    Osteoarthritis    Sleep apnea    no CPAP use    Visual impairment    readers      Past Surgical History:   Procedure Laterality Date    Brain surgery  2012    fluid leaking from brain     Carpal tunnel release Bilateral 2000    Colonoscopy      Colonoscopy N/A 6/10/2019    Procedure: COLONOSCOPY;  Surgeon: David Kelly MD;  Location: [facility] ENDOSCOPY    Colonoscopy      Hysterectomy  2009    Knee surgery Left 12/13/2019    left total knee arthroplasty    Shoulder arthroscopy  2010 and 2012    RCR      Family History   Problem Relation Age of Onset    Uterine Cancer Self     Other (Other) Mother         SLE    Diabetes Father     Heart Disorder Father         CHF, Cardiac arrest    Pancreatic Cancer Brother     Cancer Brother 65        pancreatic      Social History     Socioeconomic History    Marital status: Single   Tobacco Use    Smoking status: Former     Current packs/day: 0.00     Types: Cigarettes    Smokeless tobacco: Never   Vaping Use    Vaping status: Never Used   Substance and Sexual Activity    Alcohol use: No    Drug use: No   Other Topics Concern    Caffeine Concern Yes    Exercise No           REVIEW OF SYSTEMS:   Denies nausea, fever, chills  No calf pain  No other muscle or joint aches  Denies chest pain or shortness of breath.      EXAM:   There were no vitals taken for this visit.    Constitutional:   Patient in no apparent distress. Well kept. Of normal body habitus. Alert and oriented to person, place, and time.  Vascular Examination:  DP pulse is 2/4  PT pulse is 2/4  Capillary refill  is immediate  Integumentary Examination:   Digital hair growth is present left and is present right.  Skin is of diminished texture and decreased turgor.  Neurological Examination:  Monofilament (10-g) sensation is 5/5 to right and 5/5 to left.  Sharp/dull is present to right and is present to left.  Parasthesias absent.  Musculoskeletal Examination:  Muscle Strength is 5/5.          LABS & IMAGING:     Lab Results   Component Value Date    GLU 98 12/26/2023    BUN 18 12/26/2023    CREATSERUM 0.75 12/26/2023    BUNCREA SEE NOTE: 12/26/2023    ANIONGAP 5 12/17/2019    GFRAA 102 03/25/2022    GFRNAA 88 03/25/2022    CA 9.8 12/26/2023     12/26/2023    K 4.3 12/26/2023     12/26/2023    CO2 26 12/26/2023    OSMOCALC 288 12/17/2019        Lab Results   Component Value Date     08/21/2020    A1C 5.7 (H) 12/26/2023        No results found.     ASSESSMENT AND PLAN:   Diagnoses and all orders for this visit:    Gait instability    Type 2 diabetes mellitus with complication, without long-term current use of insulin (HCC)          Plan:     Diabetic education and instructions have been provided. We reviewed and discussed the following:    -risk categories related to pts with diabetes and foot or lower extremity complications per ADA.    -adherence to medication regimen and close monitoring or blood sugar control.   -daily monitoring/inspection of feet and shoes.   -proper management of diet and weight   -regular follow up with PCP and specialty providers as recommended   -Lower extremity complications related to DM were reviewed and stressed prevention.       RTC 1 year for CDFE    No follow-ups on file.    Amador Capone DPM  6/12/24

## 2024-06-12 NOTE — TELEPHONE ENCOUNTER
Please Review. Protocol Failed; No Protocol     Requested Prescriptions   Pending Prescriptions Disp Refills    CLOPIDOGREL 75 MG Oral Tab [Pharmacy Med Name: CLOPIDOGREL 75 MG TABLET] 90 tablet 3     Sig: TAKE 1 TABLET BY MOUTH EVERY DAY       There is no refill protocol information for this order            Future Appointments         Provider Department Appt Notes    Today Amador Capone DPM [facility] [address], [place] sent heidi to  5/9 -Annual-Diabetic Foot Care    In 2 weeks [facility] Alfred [facility]     In 3 months KAEL QUEEN [facility] CLN w/MAC @ [facility]          Recent Outpatient Visits              3 months ago Encounter for annual health examination    [facility]    Office Visit    5 months ago Type 2 diabetes mellitus with hyperosmolarity without coma, without long-term current use of insulin (HCC)    [facility]    Office Visit    11 months ago Primary hypertension    [facility]    Office Visit    11 months ago Gait instability    [facility] [address], [place] Amador Capone DPM    Office Visit    1 year ago Annual physical exam    [facility] Niya Nam MD    Office Visit

## 2024-07-01 ENCOUNTER — HOSPITAL ENCOUNTER (OUTPATIENT)
Dept: ULTRASOUND IMAGING | Facility: HOSPITAL | Age: 74
Discharge: HOME | End: 2024-07-01
Attending: INTERNAL MEDICINE
Payer: MEDICARE

## 2024-07-01 ENCOUNTER — OFFICE VISIT (OUTPATIENT)
Dept: INTERNAL MEDICINE CLINIC | Facility: CLINIC | Age: 74
End: 2024-07-01
Payer: MEDICARE

## 2024-07-01 VITALS
OXYGEN SATURATION: 100 % | HEIGHT: 62 IN | TEMPERATURE: 98 F | BODY MASS INDEX: 45.49 KG/M2 | DIASTOLIC BLOOD PRESSURE: 80 MMHG | HEART RATE: 72 BPM | WEIGHT: 247.19 LBS | SYSTOLIC BLOOD PRESSURE: 133 MMHG

## 2024-07-01 DIAGNOSIS — M79.661 PAIN AND SWELLING OF RIGHT LOWER LEG: ICD-10-CM

## 2024-07-01 DIAGNOSIS — M79.89 PAIN AND SWELLING OF RIGHT LOWER LEG: Primary | ICD-10-CM

## 2024-07-01 DIAGNOSIS — M79.661 PAIN AND SWELLING OF RIGHT LOWER LEG: Primary | ICD-10-CM

## 2024-07-01 DIAGNOSIS — M79.89 PAIN AND SWELLING OF RIGHT LOWER LEG: ICD-10-CM

## 2024-07-01 PROCEDURE — 93971 EXTREMITY STUDY: CPT | Performed by: INTERNAL MEDICINE

## 2024-07-01 PROCEDURE — 99214 OFFICE O/P EST MOD 30 MIN: CPT | Performed by: INTERNAL MEDICINE

## 2024-07-01 NOTE — PROGRESS NOTES
Subjective:     Patient ID: Ivonne Israel is a 74 year old female.    HPI    History/Other: She came today for follow-up.  According to the patient she just flew back from [place]  She went for a .  Since that she came back since yesterday she noticed that her legs are swollen and her feet.  This morning she noticed that her right 1 is more swollen than left.  She also has some pain behind her right knee that she was not able to walk .  She states that she walked a lot while she was in [place].  She denies any other complaints.  She denies any shortness of breath.  Her blood pressure is controlled.    Review of Systems   Constitutional: Negative.    HENT: Negative.     Eyes: Negative.    Respiratory: Negative.     Cardiovascular: Negative.    Endocrine: Negative.    Genitourinary: Negative.    Musculoskeletal:         Right leg pain, swelling mostly around the foot   Skin: Negative.    Hematological: Negative.    Psychiatric/Behavioral: Negative.       Current Outpatient Medications   Medication Sig Dispense Refill    clopidogrel 75 MG Oral Tab Take 1 tablet (75 mg total) by mouth daily. 90 tablet 3    lisinopril 20 MG Oral Tab Take 1 tablet (20 mg total) by mouth 2 (two) times daily. 180 tablet 3    metFORMIN 500 MG Oral Tab Take 1 tablet (500 mg total) by mouth Before Dinner. 90 tablet 3    amLODIPine 10 MG Oral Tab Take 1 tablet (10 mg total) by mouth daily. 90 tablet 3    Ferrous Sulfate 325 (65 Fe) MG Oral Tab Take 1 tablet (325 mg total) by mouth daily. 90 tablet 0    Coenzyme Q10 (CO Q10) 100 MG Oral Cap Take 1 tablet by mouth daily.      alendronate 70 MG Oral Tab Take 1 tablet (70 mg total) by mouth once a week. 12 tablet 1    pravastatin 20 MG Oral Tab Take 1 tablet (20 mg total) by mouth nightly. 90 tablet 3    ONETOUCH ULTRA In Vitro Strip USE ONCE DAILY 100 strip 3    aspirin 81 MG Oral Tab EC Take 1 tablet (81 mg total) by mouth daily.      ONETOUCH DELICA PLUS [id number] Does not apply Misc  USE TO TEST BLOOD SUGAR EVERY  each 3    Blood Glucose Monitoring Suppl (ONETOUCH ULTRA 2) w/Device Does not apply Kit Test daily 1 kit 0    MAGNESIUM OR Take by mouth daily.        VITAMIN D OR Take 5,000 Units by mouth daily.        Cyanocobalamin (VITAMIN B 12) 100 MCG Oral Lozenge Take 1,000 mg by mouth daily.      Turmeric Curcumin 500 MG Oral Cap Take 1 tablet by mouth 2 (two) times daily.         Allergies:No Known Allergies    Past Medical History:    Acute ischemic left MCA stroke (HCC)    Acute ischemic stroke (HCC)    Acute, but ill-defined, cerebrovascular disease    Arthritis    Arthritis of carpometacarpal (CMC) joint of right thumb    Cancer (HCC)    Uterine    Cancer determined by uterine cervix biopsy (HCC)    Diabetes (HCC)    Essential hypertension    High blood pressure    High cholesterol    History of total knee arthroplasty, left    Hyperlipidemia    Incontinence    bladder    Obesity    Osteoarthritis    Sleep apnea    no CPAP use    Visual impairment    readers      Past Surgical History:   Procedure Laterality Date    Brain surgery  2012    fluid leaking from brain     Carpal tunnel release Bilateral 2000    Colonoscopy      Colonoscopy N/A 6/10/2019    Procedure: COLONOSCOPY;  Surgeon: David Kelly MD;  Location: [facility] ENDOSCOPY    Colonoscopy      Hysterectomy  2009    Knee surgery Left 12/13/2019    left total knee arthroplasty    Shoulder arthroscopy  2010 and 2012    RCR      Family History   Problem Relation Age of Onset    Uterine Cancer Self     Other (Other) Mother         SLE    Diabetes Father     Heart Disorder Father         CHF, Cardiac arrest    Pancreatic Cancer Brother     Cancer Brother 65        pancreatic      Social History:   Social History     Socioeconomic History    Marital status: Single   Tobacco Use    Smoking status: Former     Current packs/day: 0.00     Types: Cigarettes    Smokeless tobacco: Never   Vaping Use    Vaping status: Never Used    Substance and Sexual Activity    Alcohol use: No    Drug use: No   Other Topics Concern    Caffeine Concern Yes    Exercise No        Objective:   Physical Exam  Vitals and nursing note reviewed.   Constitutional:       Appearance: Normal appearance.   HENT:      Head: Normocephalic and atraumatic.   Cardiovascular:      Rate and Rhythm: Normal rate and regular rhythm.      Pulses: Normal pulses.      Heart sounds: Normal heart sounds.   Pulmonary:      Effort: Pulmonary effort is normal.      Breath sounds: Normal breath sounds.   Abdominal:      Palpations: Abdomen is soft.   Musculoskeletal:      Cervical back: Normal range of motion and neck supple.      Comments: Right leg swelling mostly around the right foot   Skin:     General: Skin is warm and dry.   Neurological:      Mental Status: She is alert. Mental status is at baseline.         Assessment & Plan:   1. Pain and swelling of right lower leg    Recent travel I will order stat ultrasound venous Doppler I did advise her to elevate her legs    No orders of the defined types were placed in this encounter.      Meds This Visit:  Requested Prescriptions      No prescriptions requested or ordered in this encounter       Imaging & Referrals:  US VENOUS DOPPLER LEG RIGHT - DIAG IMG (CPT=93971)

## 2024-08-01 RX ORDER — ALENDRONATE SODIUM 70 MG/1
70 TABLET ORAL WEEKLY
Qty: 12 TABLET | Refills: 0 | Status: SHIPPED | OUTPATIENT
Start: 2024-08-01

## 2024-08-01 NOTE — TELEPHONE ENCOUNTER
Please review; protocol failed/ has no protocol    Requested Prescriptions   Pending Prescriptions Disp Refills    ALENDRONATE 70 MG Oral Tab [Pharmacy Med Name: ALENDRONATE SODIUM 70 MG TAB] 12 tablet 1     Sig: Take 1 tablet (70 mg total) by mouth once a week.       Osteoporosis Medication Protocol Failed - 7/30/2024 12:24 AM        Failed - DEXA scan within past 2 years        Failed - CMP within the past 12 months        Passed - In person appointment or virtual visit in the past 6 mos or appointment in next 3 mos     Recent Outpatient Visits              [facility] and [facility]    [facility]    1 month ago Gait instability    [facility], [place], Eric Hal, DPM    Office Visit    5 months ago [facility]counter for annual health examination    [facility]    [facility] Visit    7 months ago Type 2 diabetes mellitus with hyperosmolarity without coma, without long-term current use of insulin (HCC)    [facility]    [facility] Visit    1 year ago Primary hypertension    [facility]    [facility]          Future Appointments         Provider Department Appt Notes    In 1 month KAEL QUEEN [facility] CLN w/MAC @ [facility]    In 10 months [facility] past pt of dr montague                    Passed - Calcium level between 8.3 and 10.3     Lab Results   Component Value Date    CA 9.8 12/26/2023               Passed - GFR level greater than 35     GFR Evaluation  EGFRCR: 85 , resulted on 12/26/2023             Recent Outpatient Visits              1 month ago [facility] right  [facility]    Office Visit    1 month ago Gait instability    [facility] [place], Eric Hal, DPM    Office Visit    5 months ago Encounter for annual health examination    [facility], Niya Nam MD    Office Visit    7 months ago Type 2 diabetes mellitus with hyperosmolarity without coma, without long-term current use of insulin (HCC)    [facility]    Office Visit    1 year ago Primary hypertension    [facility]    [facility] Visit          Future Appointments         Provider Department Appt Notes    In 1 month KAEL QUEEN [facility] CLN w/MAC @ [facility]    In 10 months [facility] past pt of dr montague

## 2024-08-28 ENCOUNTER — MED REC SCAN ONLY (OUTPATIENT)
Dept: INTERNAL MEDICINE CLINIC | Facility: CLINIC | Age: 74
End: 2024-08-28

## 2024-09-23 NOTE — PAT NURSING NOTE
Patient instructed to hold vitamins, supplements and Iron 7 days prior to procedure per the Pre-surgical testing policy.      Patient has instructions to hold Plavix for 5 days.

## 2024-09-27 ENCOUNTER — HOSPITAL ENCOUNTER (OUTPATIENT)
Facility: HOSPITAL | Age: 74
Setting detail: HOSPITAL OUTPATIENT SURGERY
Discharge: HOME | End: 2024-09-27
Attending: INTERNAL MEDICINE | Admitting: INTERNAL MEDICINE
Payer: MEDICARE

## 2024-09-27 ENCOUNTER — ANESTHESIA EVENT (OUTPATIENT)
Dept: ENDOSCOPY | Facility: HOSPITAL | Age: 74
End: 2024-09-27
Payer: MEDICARE

## 2024-09-27 ENCOUNTER — ANESTHESIA (OUTPATIENT)
Dept: ENDOSCOPY | Facility: HOSPITAL | Age: 74
End: 2024-09-27
Payer: MEDICARE

## 2024-09-27 VITALS
BODY MASS INDEX: 44.16 KG/M2 | SYSTOLIC BLOOD PRESSURE: 128 MMHG | WEIGHT: 240 LBS | HEART RATE: 56 BPM | HEIGHT: 62 IN | DIASTOLIC BLOOD PRESSURE: 45 MMHG | RESPIRATION RATE: 12 BRPM | OXYGEN SATURATION: 100 %

## 2024-09-27 DIAGNOSIS — Z86.0100 HISTORY OF COLONIC POLYPS: ICD-10-CM

## 2024-09-27 DIAGNOSIS — Z12.11 SPECIAL SCREENING FOR MALIGNANT NEOPLASMS, COLON: ICD-10-CM

## 2024-09-27 LAB — GLUCOSE BLDC GLUCOMTR-MCNC: 71 MG/DL (ref 70–99)

## 2024-09-27 PROCEDURE — 0DBN8ZZ EXCISION OF SIGMOID COLON, VIA NATURAL OR ARTIFICIAL OPENING ENDOSCOPIC: ICD-10-PCS | Performed by: INTERNAL MEDICINE

## 2024-09-27 PROCEDURE — 0DBL8ZZ EXCISION OF TRANSVERSE COLON, VIA NATURAL OR ARTIFICIAL OPENING ENDOSCOPIC: ICD-10-PCS | Performed by: INTERNAL MEDICINE

## 2024-09-27 PROCEDURE — 45385 COLONOSCOPY W/LESION REMOVAL: CPT | Performed by: INTERNAL MEDICINE

## 2024-09-27 PROCEDURE — 0DBK8ZZ EXCISION OF ASCENDING COLON, VIA NATURAL OR ARTIFICIAL OPENING ENDOSCOPIC: ICD-10-PCS | Performed by: INTERNAL MEDICINE

## 2024-09-27 PROCEDURE — 45380 COLONOSCOPY AND BIOPSY: CPT | Performed by: INTERNAL MEDICINE

## 2024-09-27 PROCEDURE — 0DBH8ZZ EXCISION OF CECUM, VIA NATURAL OR ARTIFICIAL OPENING ENDOSCOPIC: ICD-10-PCS | Performed by: INTERNAL MEDICINE

## 2024-09-27 RX ORDER — LIDOCAINE HYDROCHLORIDE 10 MG/ML
INJECTION, SOLUTION EPIDURAL; INFILTRATION; INTRACAUDAL; PERINEURAL AS NEEDED
Status: DISCONTINUED | OUTPATIENT
Start: 2024-09-27 | End: 2024-09-27 | Stop reason: SURG

## 2024-09-27 RX ORDER — SODIUM CHLORIDE, SODIUM LACTATE, POTASSIUM CHLORIDE, CALCIUM CHLORIDE 600; 310; 30; 20 MG/100ML; MG/100ML; MG/100ML; MG/100ML
INJECTION, SOLUTION INTRAVENOUS CONTINUOUS
Status: DISCONTINUED | OUTPATIENT
Start: 2024-09-27 | End: 2024-09-27

## 2024-09-27 RX ADMIN — SODIUM CHLORIDE, SODIUM LACTATE, POTASSIUM CHLORIDE, CALCIUM CHLORIDE: 600; 310; 30; 20 INJECTION, SOLUTION INTRAVENOUS at 14:35:00

## 2024-09-27 RX ADMIN — SODIUM CHLORIDE, SODIUM LACTATE, POTASSIUM CHLORIDE, CALCIUM CHLORIDE: 600; 310; 30; 20 INJECTION, SOLUTION INTRAVENOUS at 15:03:00

## 2024-09-27 RX ADMIN — LIDOCAINE HYDROCHLORIDE 25 MG: 10 INJECTION, SOLUTION EPIDURAL; INFILTRATION; INTRACAUDAL; PERINEURAL at 14:39:00

## 2024-09-27 NOTE — ANESTHESIA PREPROCEDURE EVALUATION
Anesthesia PreOp Note    HPI:     Ivonne Israel is a 74 year old female who presents for preoperative consultation requested by: David Kelly MD    Date of Surgery: 9/27/2024    Procedure(s):  COLONOSCOPY  Indication: Special screening for malignant neoplasms, colon/ History of colonic polyps    Relevant Problems   No relevant active problems       NPO:  Last Liquid Consumption Date: 09/27/24  Last Liquid Consumption Time: 1000  Last Solid Consumption Date: 09/26/24  Last Solid Consumption Time: 1000  Last Liquid Consumption Date: 09/27/24          History Review:  Patient Active Problem List    Diagnosis Date Noted    Obesity due to excess calories 02/22/2024    Occlusion and stenosis of unspecified carotid artery 02/21/2024    MINE (obstructive sleep apnea) 02/21/2024    Cerebral infarction due to thrombosis of left middle cerebral artery ([facility]) 01/14/2021    Stenosis of left carotid artery 01/14/2021    Tortuous aorta ([facility]) 02/03/2020    Osteoarthritis of right hip 11/12/2019    Osteoporosis 02/19/2019    Type 2 diabetes mellitus with complication, without long-term current use of insulin ([facility]) 04/05/2018    History of endometrial cancer 04/05/2018    Osteoarthritis of fingers of hands, bilateral 07/31/2017    Dyslipidemia 01/25/2017    Essential hypertension 12/23/2016    DM type 2 (diabetes mellitus, type 2) ([facility]) 12/23/2016    Spondylolysis, lumbar region 08/18/2016    Postmenopausal atrophic vaginitis 04/18/2016    Aneurysm ([facility]) 02/05/2013    Arthritis 09/29/2009    Diabetes ([facility]) 09/29/2009       Past Medical History:    Acute ischemic left MCA stroke ([facility])    Acute ischemic stroke ([facility])    Acute, but ill-defined, cerebrovascular disease    Arthritis    Arthritis of carpometacarpal (CMC) joint of right thumb    Cancer ([facility])    Uterine    Cancer determined by uterine cervix biopsy ([facility])    Diabetes ([facility])    Essential hypertension    High blood pressure    High cholesterol    History of total knee  arthroplasty, left    Hyperlipidemia    Incontinence    bladder    Obesity    Osteoarthritis    Sleep apnea    no CPAP use    Stroke (HCC)    Visual impairment    readers       Past Surgical History:   Procedure Laterality Date    Brain surgery  2012    fluid leaking from brain     Carpal tunnel release Bilateral 2000    Colonoscopy      Colonoscopy N/A 6/10/2019    Procedure: COLONOSCOPY;  Surgeon: David Kelly MD;  Location: [facility] ENDOSCOPY    Colonoscopy      Hysterectomy  2009    Knee surgery Left 12/13/2019    left total knee arthroplasty    Shoulder arthroscopy  2010 and 2012    RCR       Medications Prior to Admission   Medication Sig Dispense Refill Last Dose    alendronate 70 MG Oral Tab Take 1 tablet (70 mg total) by mouth once a week. 12 tablet 0 last week per patient    clopidogrel 75 MG Oral Tab Take 1 tablet (75 mg total) by mouth daily. 90 tablet 3 9/22/2024    lisinopril 20 MG Oral Tab Take 1 tablet (20 mg total) by mouth 2 (two) times daily. 180 tablet 3 9/27/2024 at 0930    metFORMIN 500 MG Oral Tab Take 1 tablet (500 mg total) by mouth Before Dinner. 90 tablet 3 9/22/2024    amLODIPine 10 MG Oral Tab Take 1 tablet (10 mg total) by mouth daily. 90 tablet 3 9/27/2024 at 0930    Ferrous Sulfate 325 (65 Fe) MG Oral Tab Take 1 tablet (325 mg total) by mouth daily. 90 tablet 0 not taking    Coenzyme Q10 (CO Q10) 100 MG Oral Cap Take 1 tablet by mouth daily.   9/22/2024    pravastatin 20 MG Oral Tab Take 1 tablet (20 mg total) by mouth nightly. 90 tablet 3 9/26/2024    aspirin 81 MG Oral Tab EC Take 1 tablet (81 mg total) by mouth daily.   9/27/2024 at 0930    MAGNESIUM OR Take by mouth daily.     two weeks per patient    VITAMIN D OR Take 5,000 Units by mouth daily.     9/23/2024    Cyanocobalamin (VITAMIN B 12) 100 MCG Oral Lozenge Take 1,000 mg by mouth daily.   9/23/2024    Turmeric Curcumin 500 MG Oral Cap Take 1 tablet by mouth 2 (two) times daily.     9/22/2024    ONETOUCH ULTRA In Vitro Strip  USE ONCE DAILY 100 strip 3     ONETOUCH DELICA PLUS [id number] Does not apply Misc USE TO TEST BLOOD SUGAR EVERY  each 3     Blood Glucose Monitoring Suppl (ONETOUCH ULTRA 2) w/Device Does not apply Kit Test daily 1 kit 0      Current Facility-Administered Medications Ordered in Epic   Medication Dose Route Frequency Provider Last Rate Last Admin    lactated ringers infusion   Intravenous Continuous David Kelly MD         No current [facility]-ordered outpatient medications on file.       No Known Allergies    Family History   Problem Relation Age of Onset    Uterine Cancer Self     Other (Other) Mother         SLE    Diabetes Father     Heart Disorder Father         CHF, Cardiac arrest    Pancreatic Cancer Brother     Cancer Brother 65        pancreatic     Social History     Socioeconomic History    Marital status: Single   Tobacco Use    Smoking status: Former     Current packs/day: 0.00     Types: Cigarettes    Smokeless tobacco: Never   Vaping Use    Vaping status: Never Used   Substance and Sexual Activity    Alcohol use: No    Drug use: No   Other Topics Concern    Caffeine Concern Yes    Exercise No       Available pre-op labs reviewed.             Vital Signs:  Body mass index is 43.9 kg/m².   height is 1.575 m (5' 2\") and weight is 108.9 kg (240 lb). Her blood pressure is 125/58 and her pulse is 60. Her respiration is 12 and oxygen saturation is 96%.   Vitals:    09/23/24 1511 09/27/24 1417   BP:  125/58   Pulse:  60   Resp:  12   SpO2:  96%   Weight: 108.9 kg (240 lb)    Height: 1.575 m (5' 2\")         Anesthesia Evaluation     Patient summary reviewed and Nursing notes reviewed    History of anesthetic complications   Airway   Mallampati: II  TM distance: >3 FB  Neck ROM: full  Dental - Dentition appears grossly intact     Pulmonary - normal exam   (+) sleep apnea    ROS comment: Non-compliant with CPAP  Cardiovascular - normal exam  (+) hypertension    ROS comment:   Sinus rhythm with frequent  Premature ventricular complexes  Otherwise normal ECG  When compared with ECG of 23-Dec-2020 03:36,  Premature ventricular complexes are now Present  Criteria for Anteroseptal infarct are no longer Present  QT has shortened  Confirmed by Rickey Lewis ([id number]) on 8/27/2024 6:46:46 AM        Neuro/Psych    (+)  CVA,        Comments: Balance issues post stroke, uses cane    GI/Hepatic/Renal    (+) bowel prep    Endo/Other    (+) diabetes mellitus type 2  Abdominal   (+) obese                 Anesthesia Plan:   ASA:  3  Plan:   General  Informed Consent Plan and Risks Discussed With:  Patient  Discussed plan with:  Surgeon      I have informed Ivonne Israel and/or legal guardian or family member of the nature of the anesthetic plan, benefits, risks including possible dental damage if relevant, major complications, and any alternative forms of anesthetic management.   All of the patient's questions were answered to the best of my ability. The patient desires the anesthetic management as planned.  DELLA NY CRNA  9/27/2024 2:18 PM  Present on Admission:  **None**

## 2024-09-27 NOTE — DISCHARGE INSTRUCTIONS
Home Care Instructions for Colonoscopy  with Sedation    Diet:  - Resume your regular diet as tolerated unless otherwise instructed.  - Start with light meals to minimize bloating.  - Do not drink alcohol today.    Medication:  - If you have questions about resuming your normal medications, please contact your Primary Care Physician.    RESUME PLAVIX TOMORROW (09/28/2024)    Activities:  - Take it easy today. Do not return to work today.  - Do not drive today.  - Do not operate any machinery today (including kitchen equipment).    Colonoscopy:  - You may notice some rectal \"spotting\" (a little blood on the toilet tissue) for a day or two after the exam. This is normal.  - If you experience any rectal bleeding (not spotting), persistent tenderness or sharp severe abdominal pains, oral temperature over 100 degrees Fahrenheit, light-headedness or dizziness, or any other problems, contact your doctor.  **If unable to reach your doctor, please go to the [facility] Emergency Room**    - Your referring physician will receive a full report of your examination.  - If you do not hear from your doctor's office within two weeks of your biopsy, please call them for your results.    You may be able to see your laboratory results in [employer] between 4 and 7 business days.  In some cases, your physician may not have viewed the results before they are released to [employer].  If you have questions regarding your results contact the physician who ordered the test/exam by phone or via [employer] by choosing \"Ask a Medical Question.\"

## 2024-09-27 NOTE — H&P
History & Physical Examination    Patient Name: Ivonne Israel  MRN: [id number]  CSN: [id number]  YOB: 1950    Diagnosis:   Hx colon polyps   CRC screening    Medications Prior to Admission   Medication Sig Dispense Refill Last Dose    alendronate 70 MG Oral Tab Take 1 tablet (70 mg total) by mouth once a week. 12 tablet 0 last week per patient    clopidogrel 75 MG Oral Tab Take 1 tablet (75 mg total) by mouth daily. 90 tablet 3 9/22/2024    lisinopril 20 MG Oral Tab Take 1 tablet (20 mg total) by mouth 2 (two) times daily. 180 tablet 3 9/27/2024 at 0930    metFORMIN 500 MG Oral Tab Take 1 tablet (500 mg total) by mouth Before Dinner. 90 tablet 3 9/22/2024    amLODIPine 10 MG Oral Tab Take 1 tablet (10 mg total) by mouth daily. 90 tablet 3 9/27/2024 at 0930    Ferrous Sulfate 325 (65 Fe) MG Oral Tab Take 1 tablet (325 mg total) by mouth daily. 90 tablet 0 not taking    Coenzyme Q10 (CO Q10) 100 MG Oral Cap Take 1 tablet by mouth daily.   9/22/2024    pravastatin 20 MG Oral Tab Take 1 tablet (20 mg total) by mouth nightly. 90 tablet 3 9/26/2024    aspirin 81 MG Oral Tab EC Take 1 tablet (81 mg total) by mouth daily.   9/27/2024 at 0930    MAGNESIUM OR Take by mouth daily.     two weeks per patient    VITAMIN D OR Take 5,000 Units by mouth daily.     9/23/2024    Cyanocobalamin (VITAMIN B 12) 100 MCG Oral Lozenge Take 1,000 mg by mouth daily.   9/23/2024    Turmeric Curcumin 500 MG Oral Cap Take 1 tablet by mouth 2 (two) times daily.     9/22/2024    ONETOUCH ULTRA In Vitro Strip USE ONCE DAILY 100 strip 3     ONETOUCH DELICA PLUS [id number] Does not apply Misc USE TO TEST BLOOD SUGAR EVERY  each 3     Blood Glucose Monitoring Suppl (ONETOUCH ULTRA 2) w/Device Does not apply Kit Test daily 1 kit 0      Current Facility-Administered Medications   Medication Dose Route Frequency    lactated ringers infusion   Intravenous Continuous       Allergies: No Known Allergies    Past Medical History:     Acute ischemic left MCA stroke (HCC)    Acute ischemic stroke (HCC)    Acute, but ill-defined, cerebrovascular disease    Arthritis    Arthritis of carpometacarpal (CMC) joint of right thumb    Cancer (HCC)    Uterine    Cancer determined by uterine cervix biopsy (HCC)    Diabetes (HCC)    Essential hypertension    High blood pressure    High cholesterol    History of total knee arthroplasty, left    Hyperlipidemia    Incontinence    bladder    Obesity    Osteoarthritis    Sleep apnea    no CPAP use    Stroke (HCC)    Visual impairment    readers     Past Surgical History:   Procedure Laterality Date    Brain surgery  2012    fluid leaking from brain     Carpal tunnel release Bilateral 2000    Colonoscopy      Colonoscopy N/A 6/10/2019    Procedure: COLONOSCOPY;  Surgeon: David Kelly MD;  Location: [facility] ENDOSCOPY    Colonoscopy      Hysterectomy  2009    Knee surgery Left 12/13/2019    left total knee arthroplasty    Shoulder arthroscopy  2010 and 2012    RCR     Family History   Problem Relation Age of Onset    Uterine Cancer Self     Other (Other) Mother         SLE    Diabetes Father     Heart Disorder Father         CHF, Cardiac arrest    Pancreatic Cancer Brother     Cancer Brother 65        pancreatic     Social History     Tobacco Use    Smoking status: Former     Current packs/day: 0.00     Types: Cigarettes    Smokeless tobacco: Never   Substance Use Topics    Alcohol use: No       SYSTEM Check if Review is Normal Check if Physical Exam is Normal If not normal, please explain:   HEENT [x ] [ x]    NECK & BACK [x ] [x ]    HEART [x ] [ x]    LUNGS [x ] [ x]    ABDOMEN [x ] [x ]    UROGENITAL [ ] [ ]    EXTREMITIES [x ] [x ]    OTHER        [ x ] I have discussed the risks and benefits and alternatives with the patient/family.  They understand and agree to proceed with plan of care.  [ x ] I have reviewed the History and Physical done within the last 30 days.  Any changes noted above.    David ACOSTA  MD [place]icia  9/27/2024  2:15 PM

## 2024-09-27 NOTE — OPERATIVE REPORT
[facility] Endoscopy Report  Date of procedure-September 27, 2024    Preoperative Diagnosis:  -Colorectal cancer screening  -History colon polyps      Postoperative Diagnosis:  -Colon polyps x 4  -Diverticular disease   -Internal hemorrhoids      Procedure:    Colonoscopy       Surgeon:  David Kelly M.D.    Anesthesia:  MAC     Technique:  After informed consent, the patient was placed in the left lateral recumbent position.  Digital rectal examination revealed no palpable intraluminal abnormalities.  An Olympus variable stiffness 190 series HD colonoscope was inserted into the rectum and advanced under direct vision by following the lumen to the cecum.  The colon was examined upon withdrawal in the left lateral cubitus.    The procedure was well tolerated without immediate complication.      Findings:  The preparation of the colon was good.  The terminal ileum was examined for 4 cm and visually normal.  The ileocecal valve was well preserved. The visualized colonic mucosa from the cecum to the anal verge was normal with an intact vascular pattern.    Colon polyps x 4 removed as follows;  -Cecum x 1, diminutive removed by cold forceps technique.  -Ascending x 1, sessile 4 mm in size and cold snare removed.  -Transverse x 1, sessile 4 mm in size and cold snare removed.  -Sigmoid x 1, sessile 4 mm in size and cold snare removed.  All polypectomy sites inspected and found to be free bleeding and specimens retrieved and sent for analysis.    Diverticulosis located in the left colon, no diverticulitis.    Small internal hemorrhoids noted on retroflexed view.    Estimated blood loss-insignificant  Specimens-see above    Impression:  -Colon polyps x 4  -Diverticular disease   -Internal hemorrhoids    Recommendations:  - Post polypectomy instructions given  - Repeat colonoscopy in 3- 5 years  - High fiber diet for diverticular disease  - Symptomatic treatment of hemorrhoids          David Kelly,  MD  9/27/2024  3:02 PM

## 2024-09-27 NOTE — ANESTHESIA POSTPROCEDURE EVALUATION
Patient: Ivonne Israel    Procedure Summary       Date: 09/27/24 Room / Location: [facility] ENDOSCOPY 01 / [facility] ENDOSCOPY    Anesthesia Start: 1435 Anesthesia Stop: 1506    Procedure: COLONOSCOPY Diagnosis:       Special screening for malignant neoplasms, colon      History of colonic polyps      (lipoma, polyps ,diverticulosis, hemorrhoids)    Surgeons: David Kelly MD Anesthesiologist: Christen Roberson CRNA    Anesthesia Type: general ASA Status: 3            Anesthesia Type: general    Vitals Value Taken Time   /50 09/27/24 1505   Temp  09/27/24 1506   Pulse 78 09/27/24 1505   Resp 12 09/27/24 1505   SpO2 99 % 09/27/24 1505       [facility] AN Post Evaluation:   Patient Evaluated in PACU  Patient Participation: complete - patient participated  Level of Consciousness: awake  Pain Score: 0  Pain Management: adequate  Airway Patency:patent  Dental exam unchanged from preop  Yes    Nausea/Vomiting: none  Cardiovascular Status: acceptable  Respiratory Status: acceptable  Postoperative Hydration acceptable      CHRISTEN ROBERSON CRNA  9/27/2024 3:06 PM

## 2024-09-30 ENCOUNTER — TELEPHONE (OUTPATIENT)
Facility: CLINIC | Age: 74
End: 2024-09-30

## 2024-09-30 NOTE — TELEPHONE ENCOUNTER
Health Maintenance Updated.    3 year colonoscopy recall entered into patient outreach in [facility].  Next colonoscopy will be due 9/27/2027    Patient viewed below result note in MyChart:  Seen by patient Ivonne Israel on 9/28/2024  9:40 PM

## 2024-09-30 NOTE — TELEPHONE ENCOUNTER
----- Message from David Kelly sent at 9/28/2024  3:41 PM CDT -----  I wanted to get back to you with your colonoscopy results.  You had 4 colon polyps removed which were benign.  I would advise a repeat colonoscopy in 3 years to make sure no new polyps are forming.      You also have internal hemorrhoids and diverticulosis.  Please stay on a high fiber diet and call with any questions.

## 2024-10-29 RX ORDER — ALENDRONATE SODIUM 70 MG/1
70 TABLET ORAL WEEKLY
Qty: 12 TABLET | Refills: 0 | Status: SHIPPED | OUTPATIENT
Start: 2024-10-29

## 2024-10-29 NOTE — TELEPHONE ENCOUNTER
Please review.  Protocol failed / Has no protocol.     Requested Prescriptions   Pending Prescriptions Disp Refills    ALENDRONATE 70 MG Oral Tab [Pharmacy Med Name: ALENDRONATE SODIUM 70 MG TAB] 12 tablet 0     Sig: Take 1 tablet (70 mg total) by mouth once a week.       Osteoporosis Medication Protocol Failed - 10/29/2024 11:03 AM        Failed - DEXA scan within past 2 years        Failed - CMP within the past 12 months        Passed - In person appointment or virtual visit in the past 6 mos or appointment in next 3 mos     Recent Outpatient Visits              4 months ago [facility]    [facility] Visit    4 months ago Gait instability    [facility], [place], Eric Hal, DPM    Office Visit    8 months ago Encounter for annual health examination    [facility]    Office Visit    10 months ago Type 2 diabetes mellitus with hyperosmolarity without coma, without long-term current use of insulin (HCC)    [facility]    [facility] Visit    1 year ago Primary hypertension    [facility]    [facility]          Future Appointments         Provider Department Appt Notes    In 7 months [facility]. past pt of dr montague                    Passed - Calcium level between 8.3 and 10.3     Lab Results   Component Value Date    CA 9.8 12/26/2023               Passed - GFR level greater than 35     GFR Evaluation  EGFRCR: 85 , resulted on 12/26/2023             Future Appointments         Provider Department Appt Notes    In 7 months [facility]. past pt of dr montague          Recent Outpatient Visits              4 months ago Pain and swelling of right lower leg    [facility], Niya Nam MD    Office Visit    4 months ago Gait instability    [facility], [place], Eric Hal, DPM    Office Visit    8 months ago Encounter for annual health examination    [facility], Niya Nam MD    Office Visit    10 months ago Type 2 diabetes mellitus with hyperosmolarity without coma, without long-term current use of insulin (HCC)    [facility]    [facility] Visit    1 year ago Primary hypertension    [facility], Niya Nam MD    Office Visit

## 2025-01-06 RX ORDER — PRAVASTATIN SODIUM 20 MG
20 TABLET ORAL NIGHTLY
Qty: 90 TABLET | Refills: 0 | Status: SHIPPED | OUTPATIENT
Start: 2025-01-06

## 2025-01-06 NOTE — TELEPHONE ENCOUNTER
Please review; protocol failed/ has no protocol    Requested Prescriptions   Pending Prescriptions Disp Refills    PRAVASTATIN 20 MG Oral Tab [Pharmacy Med Name: PRAVASTATIN SODIUM 20 MG TAB] 90 tablet 3     Sig: TAKE 1 TABLET BY MOUTH EVERY DAY AT NIGHT       Cholesterol Medication Protocol Failed - 1/6/2025 11:03 AM        Failed - ALT < 80     Lab Results   Component Value Date    ALT 14 12/07/2022             Failed - ALT resulted within past year        Passed - Lipid panel within past 12 months     Lab Results   Component Value Date    CHOLEST 174 02/26/2024    TRIG 96 02/26/2024    HDL 67 02/26/2024    LDL 88 02/26/2024    VLDL 14 08/21/2020    TCHDLRATIO 2.6 02/26/2024    NONHDLC 107 02/26/2024             Passed - In person appointment or virtual visit in the past 12 mos or appointment in next 3 mos     Recent Outpatient Visits              6 months ago [facility]    [facility]    6 months ago Gait instability    [facility] [place], Eric Hal, DPM    Office Visit    10 months ago Encounter for annual health examination    [facility]    [facility] Visit    1 year ago Type 2 diabetes mellitus with hyperosmolarity without coma, without long-term current use of insulin (HCC)    [facility]    [facility] Visit    1 year ago Primary hypertension    [facility]    [facility]          Future Appointments         Provider Department Appt Notes    In 1 week Niya Alfred MD [facility], [address], [place] medicare advantage well visit - last was 2.21.24  Policy informed    In 5 months Angelina Boothe DPM [facility], [place] diabetic  foot care. past pt of dr montague                       Recent Outpatient Visits              6 months ago [facility] of [facility] lower [facility]    [facility] Visit    6 months ago Gait instability    [facility], [place], Eric Hal, DPM    Office Visit    10 months ago Encounter for annual health examination    [facility]    Office Visit    1 year ago Type 2 diabetes mellitus with hyperosmolarity without coma, without long-term current use of insulin (HCC)    [facility]    Office Visit    1 year ago Primary hypertension    [facility]    [facility]          Future Appointments         Provider Department Appt Notes    In 1 week Niya Alfred MD [facility], [address], [place] medicare advantage well visit - last was 2.21.24  Policy informed    In 5 months [facility]. past pt of dr montague

## 2025-01-16 ENCOUNTER — OFFICE VISIT (OUTPATIENT)
Dept: INTERNAL MEDICINE CLINIC | Facility: CLINIC | Age: 75
End: 2025-01-16

## 2025-01-16 VITALS
RESPIRATION RATE: 18 BRPM | DIASTOLIC BLOOD PRESSURE: 50 MMHG | OXYGEN SATURATION: 98 % | HEIGHT: 62 IN | WEIGHT: 243.19 LBS | BODY MASS INDEX: 44.75 KG/M2 | HEART RATE: 70 BPM | SYSTOLIC BLOOD PRESSURE: 127 MMHG

## 2025-01-16 DIAGNOSIS — E55.9 VITAMIN D DEFICIENCY: ICD-10-CM

## 2025-01-16 DIAGNOSIS — E11.9 TYPE 2 DIABETES MELLITUS WITHOUT COMPLICATION, WITHOUT LONG-TERM CURRENT USE OF INSULIN (HCC): Primary | ICD-10-CM

## 2025-01-16 DIAGNOSIS — Z00.00 MEDICARE ANNUAL WELLNESS VISIT, SUBSEQUENT: ICD-10-CM

## 2025-01-16 DIAGNOSIS — Z78.0 MENOPAUSE: ICD-10-CM

## 2025-01-16 DIAGNOSIS — Z00.00 ANNUAL PHYSICAL EXAM: ICD-10-CM

## 2025-01-16 DIAGNOSIS — Z00.00 ENCOUNTER FOR ANNUAL HEALTH EXAMINATION: ICD-10-CM

## 2025-01-16 DIAGNOSIS — Z12.31 ENCOUNTER FOR SCREENING MAMMOGRAM FOR MALIGNANT NEOPLASM OF BREAST: ICD-10-CM

## 2025-01-16 PROBLEM — E11.8 TYPE 2 DIABETES MELLITUS WITH COMPLICATION, WITHOUT LONG-TERM CURRENT USE OF INSULIN (HCC): Status: RESOLVED | Noted: 2018-04-05 | Resolved: 2025-01-16

## 2025-01-16 NOTE — PROGRESS NOTES
Subjective:   Ivonne Israel is a 74 year old female who presents for a MA AHA (Medicare Advantage Annual Health Assessment) and Subsequent Annual Wellness visit (Pt already had Initial Annual Wellness) and scheduled follow up of multiple significant but stable problems.       History/Other:   Fall Risk Assessment:   She has been screened for Falls and is low risk.      Cognitive Assessment:   She had a completely normal cognitive assessment - see flowsheet entries     Functional Ability/Status:   Ivonne Israel has some abnormal functions as listed below:  She has Walking problems based on screening of functional status.       Depression Screening (PHQ):  PHQ-2 SCORE: 0  , done 1/16/2025   If you checked off any problems, how difficult have these problems made it for you to do your work, take care of things at home, or get along with other people?: Not difficult at all         5 minutes spent screening and counseling for depression    Advanced Directives:   She has a Living Will on file in [employer]; reviewed and discussed documents with patient (and family/surrogate if present).  She does have a POA but we do NOT have it on file in Epic.    Discussed Advance Care Planning with patient (and family/surrogate if present). Standard forms made available to patient in After Visit Summary.      Patient Active Problem List   Diagnosis    Spondylolysis, lumbar region    Essential hypertension    DM type 2 (diabetes mellitus, type 2) ([facility])    Dyslipidemia    Osteoarthritis of fingers of hands, bilateral    Type 2 diabetes mellitus with complication, without long-term current use of insulin (HCC)    History of endometrial cancer    Osteoporosis    Postmenopausal atrophic vaginitis    Diabetes (HCC)    Aneurysm (HCC)    Osteoarthritis of right hip    Tortuous aorta (HCC)    Cerebral infarction due to thrombosis of left middle cerebral artery (HCC)    Stenosis of left carotid artery    Occlusion and stenosis of unspecified  carotid artery    MINE (obstructive sleep apnea)    Obesity due to excess calories     Allergies:  She has No Known Allergies.    Current Medications:  Outpatient Medications Marked as Taking for the 1/16/25 encounter (Office Visit) with Niya Alfred MD   Medication Sig    pravastatin 20 MG Oral Tab Take 1 tablet (20 mg total) by mouth nightly.    alendronate 70 MG Oral Tab Take 1 tablet (70 mg total) by mouth once a week.    clopidogrel 75 MG Oral Tab Take 1 tablet (75 mg total) by mouth daily.    lisinopril 20 MG Oral Tab Take 1 tablet (20 mg total) by mouth 2 (two) times daily.    metFORMIN 500 MG Oral Tab Take 1 tablet (500 mg total) by mouth Before Dinner.    amLODIPine 10 MG Oral Tab Take 1 tablet (10 mg total) by mouth daily.    Ferrous Sulfate 325 (65 Fe) MG Oral Tab Take 1 tablet (325 mg total) by mouth daily.    Coenzyme Q10 (CO Q10) 100 MG Oral Cap Take 1 tablet by mouth daily.    ONETOUCH ULTRA In Vitro Strip USE ONCE DAILY    aspirin 81 MG Oral Tab EC Take 1 tablet (81 mg total) by mouth daily.    ONETOUCH DELICA PLUS [id number] Does not apply Misc USE TO TEST BLOOD SUGAR EVERY DAY    Blood Glucose Monitoring Suppl (ONETOUCH ULTRA 2) w/Device Does not apply Kit Test daily    MAGNESIUM OR Take by mouth daily.      VITAMIN D OR Take 5,000 Units by mouth daily.      Cyanocobalamin (VITAMIN B 12) 100 MCG Oral Lozenge Take 1,000 mg by mouth daily.    Turmeric Curcumin 500 MG Oral Cap Take 1 tablet by mouth 2 (two) times daily.         Medical History:  She  has a past medical history of Acute ischemic left MCA stroke ([facility]) (01/14/2021), Acute ischemic stroke ([facility]) (01/14/2021), Acute, but ill-defined, cerebrovascular disease (12/2020), Arthritis, Arthritis of carpometacarpal (CMC) joint of right thumb (11/27/2018), Cancer ([facility]) (2009), Cancer determined by uterine cervix biopsy ([facility]) (2009), Diabetes ([facility]), Essential hypertension, High blood pressure, High cholesterol, History of total knee arthroplasty,  left (12/24/2019), Hyperlipidemia, Incontinence, Obesity, Osteoarthritis, Sleep apnea, Stroke (HCC), and Visual impairment.  Surgical History:  She  has a past surgical history that includes hysterectomy (2009); shoulder arthroscopy (2010 and 2012); Brain Surgery (2012); colonoscopy; colonoscopy (N/A, 6/10/2019); carpal tunnel release (Bilateral, 2000); colonoscopy; knee surgery (Left, 12/13/2019); and colonoscopy (N/A, 9/27/2024).   Family History:  Her family history includes Cancer (age of onset: 65) in her brother; Diabetes in her father; Heart Disorder in her father; Other in her mother; Pancreatic Cancer in her brother; Uterine Cancer in her self.  Social History:  She  reports that she has quit smoking. Her smoking use included cigarettes. She has never used smokeless tobacco. She reports that she does not drink alcohol and does not use drugs.    Tobacco:  She smoked tobacco in the past but quit greater than 12 months ago.  Social History     Tobacco Use   Smoking Status Former    Current packs/day: 0.00    Types: Cigarettes   Smokeless Tobacco Never        CAGE Alcohol Screen:   CAGE screening score of 0 on 1/13/2025, showing low risk of alcohol abuse.      Patient Care Team:  Niya Alfred MD as PCP - General (Internal Medicine)  Ramon Hector DO (Physical Medicine)  Sunita Conte OT as Occupational Therapist (Occupational Therapist)    Review of Systems  GENERAL: feels well otherwise  SKIN: denies any unusual skin lesions  EYES: denies blurred vision or double vision  HEENT: denies nasal congestion, sinus pain or ST  LUNGS: denies shortness of breath with exertion  CARDIOVASCULAR: denies chest pain on exertion  GI: denies abdominal pain, denies heartburn  : denies dysuria, vaginal discharge or itching, no complaint of urinary incontinence   MUSCULOSKELETAL: denies back pain  NEURO: denies headaches  PSYCHE: denies depression or anxiety  HEMATOLOGIC: denies hx of anemia  ENDOCRINE: denies thyroid  history  ALL/ASTHMA: denies hx of allergy or asthma    Objective:   Physical Exam  General Appearance:  Alert, cooperative, no distress, appears stated age   Head:  Normocephalic, without obvious abnormality, atraumatic   Eyes:  PERRL, conjunctiva/corneas clear, EOM's intact both eyes   Ears:  Normal TM's and external ear canals, both ears   Nose: Nares normal, septum midline,mucosa normal, no drainage or sinus tenderness   Throat: Lips, mucosa, and tongue normal; teeth and gums normal   Neck: Supple, symmetrical, trachea midline, no adenopathy;  thyroid: not enlarged, symmetric, no tenderness/mass/nodules; no carotid bruit or JVD   Back:   Symmetric, no curvature, ROM normal, no CVA tenderness   Lungs:   Clear to auscultation bilaterally, respirations unlabored   Heart:  Regular rate and rhythm, S1 and S2 normal, no murmur, rub, or gallop   Abdomen:   Soft, non-tender, bowel sounds active all four quadrants,  no masses, no organomegaly   Pelvic: Deferred   Extremities: Extremities normal, atraumatic, no cyanosis or edema   Pulses: 2+ and symmetric   Skin: Skin color, texture, turgor normal, no rashes or lesions   Lymph nodes: Cervical, supraclavicular, and axillary nodes normal   Neurologic: Normal       /50 (BP Location: Left arm, Patient Position: Sitting, Cuff Size: large)   Pulse 70   Resp 18   Ht 5' 2\" (1.575 m)   Wt 243 lb 3.2 oz (110.3 kg)   SpO2 98%   BMI 44.48 kg/m²  Estimated body mass index is 44.48 kg/m² as calculated from the following:    Height as of this encounter: 5' 2\" (1.575 m).    Weight as of this encounter: 243 lb 3.2 oz (110.3 kg).    Medicare Hearing Assessment:   Hearing Screening    Screening Method: Whisper Test  Whisper Test Result: Pass         Visual Acuity:   Right Eye Visual Acuity: Uncorrected Right Eye Chart Acuity: 20/20   Left Eye Visual Acuity: Uncorrected Left Eye Chart Acuity: 20/20   Both Eyes Visual Acuity: Uncorrected Both Eyes Chart Acuity: 20/20   Able To  Tolerate Visual Acuity: Yes        Assessment & Plan:   Ivonne Israel is a 74 year old female who presents for a Medicare Assessment.     1. Medicare annual wellness visit, subsequent (Primary)  2. Encounter for annual health examination  3 type 2 diabetes mellitus without complication, without long term current use of insulin will check hba1c   4 essential hypertension - currently stable   5 dyslipidemia - will check lipid panel  6 cerebral infarction due to thrombosis of left middle cerebral artery - continue with aspirin and statin, plavix  7 history of endometrial cancer stable   8 spondylosis , lumbar region - stable   9  torturous aorta - stable   10 osteoporosis - on alendronate - will repeat bone scan  11 left carotid artery stenosis  will repeat carotid doppler  The patient indicates understanding of these issues and agrees to the plan.  Reinforced healthy diet, lifestyle, and exercise.      No follow-ups on file.     SHARA JHAVERI MD, 1/16/2025     Supplementary Documentation:   General Health:  In the past six months, have you lost more than 10 pounds without trying?: (Patient-Rptd) 2 - No  Has your appetite been poor?: (Patient-Rptd) No  Type of Diet: (Patient-Rptd) Balanced  How does the patient maintain a good energy level?: (Patient-Rptd) Other  How would you describe your daily physical activity?: (Patient-Rptd) Light  How would you describe your current health state?: (Patient-Rptd) Good  How do you maintain positive mental well-being?: (Patient-Rptd) Social Interaction;Puzzles;Games;Visiting Family  On a scale of 0 to 10, with 0 being no pain and 10 being severe pain, what is your pain level?: (Patient-Rptd) 0 - (None)  In the past six months, have you experienced urine leakage?: (Patient-Rptd) 1-Yes  At any time do you feel concerned for the safety/well-being of yourself and/or your children, in your home or elsewhere?: (Patient-Rptd) No  Have you had any immunizations at another office such as  Influenza, Hepatitis B, Tetanus, or Pneumococcal?: (Patient-Rptd) No    Health Maintenance   Topic Date Due    Zoster Vaccines (1 of 2) Never done    Pneumococcal Vaccine: 50+ Years (2 of 2 - PPSV23) 05/12/2022    Diabetes Care A1C  06/26/2024    COVID-19 Vaccine (5 - 2024-25 season) 09/01/2024    Influenza Vaccine (1) Never done    Diabetes Care: GFR  12/26/2024    Annual Well Visit  01/01/2025    Annual Depression Screening  01/01/2025    Diabetes Care: Foot Exam (Annual)  01/01/2025    Diabetes Care: Microalb/Creat Ratio (Annual)  01/01/2025    Mammogram  01/17/2025    Diabetes Care Dilated Eye Exam  04/02/2025    Colorectal Cancer Screening  09/27/2027    DEXA Scan  Completed    Fall Risk Screening (Annual)  Completed    Meningococcal B Vaccine  Aged Out

## 2025-01-25 LAB
ALBUMIN/GLOBULIN RATIO: 1.6 (CALC) (ref 1–2.5)
ALBUMIN: 4.3 G/DL (ref 3.6–5.1)
ALKALINE PHOSPHATASE: 68 U/L (ref 37–153)
ALT: 9 U/L (ref 6–29)
AST: 12 U/L (ref 10–35)
BILIRUBIN, TOTAL: 0.3 MG/DL (ref 0.2–1.2)
BUN: 17 MG/DL (ref 7–25)
CALCIUM: 9.7 MG/DL (ref 8.6–10.4)
CARBON DIOXIDE: 24 MMOL/L (ref 20–32)
CHLORIDE: 107 MMOL/L (ref 98–110)
CHOL/HDLC RATIO: 2.5 (CALC)
CHOLESTEROL, TOTAL: 155 MG/DL
CREATININE, RANDOM URINE: 111 MG/DL (ref 20–275)
CREATININE: 0.77 MG/DL (ref 0.6–1)
EGFR: 81 ML/MIN/1.73M2
GLOBULIN: 2.7 G/DL (CALC) (ref 1.9–3.7)
GLUCOSE: 95 MG/DL (ref 65–99)
HDL CHOLESTEROL: 61 MG/DL
HEMOGLOBIN A1C: 6 % OF TOTAL HGB
LDL-CHOLESTEROL: 78 MG/DL (CALC)
MICROALBUMIN/CREATININE RATIO, RANDOM URINE: 13 MG/G CREAT
MICROALBUMIN: 1.4 MG/DL
NON-HDL CHOLESTEROL: 94 MG/DL (CALC)
POTASSIUM: 4.2 MMOL/L (ref 3.5–5.3)
PROTEIN, TOTAL: 7 G/DL (ref 6.1–8.1)
SODIUM: 142 MMOL/L (ref 135–146)
TRIGLYCERIDES: 79 MG/DL
TSH W/REFLEX TO FT4: 1.79 MIU/L (ref 0.4–4.5)
VITAMIN D, 25-OH, TOTAL: 64 NG/ML (ref 30–100)

## 2025-01-29 RX ORDER — ALENDRONATE SODIUM 70 MG/1
70 TABLET ORAL WEEKLY
Qty: 12 TABLET | Refills: 0 | Status: SHIPPED | OUTPATIENT
Start: 2025-01-29

## 2025-01-29 NOTE — TELEPHONE ENCOUNTER
Please review. Protocol Failed; No Protocol    Requested Prescriptions   Pending Prescriptions Disp Refills    ALENDRONATE 70 MG Oral Tab [Pharmacy Med Name: ALENDRONATE SODIUM 70 MG TAB] 12 tablet 0     Sig: Take 1 tablet (70 mg total) by mouth once a week.       Osteoporosis Medication Protocol Failed - 1/29/2025  2:42 PM        Failed - DEXA scan within past 2 years        Passed - In person appointment or virtual visit in the past 6 mos or appointment in next 3 mos     Recent Outpatient Visits              1 week ago Type 2 diabetes mellitus without complication, without long-term current use of insulin ([facility])    [facility]    Office Visit    7 months ago [facility]    [facility] Visit    7 months ago Gait instability    [facility], [place], Eric Hal, DPM    Office Visit    11 months ago Encounter for annual health examination    [facility]    Office Visit    1 year ago Type 2 diabetes mellitus with hyperosmolarity without coma, without long-term current use of insulin ([facility])    [facility]    [facility]          Future Appointments         Provider Department Appt Notes    In 1 week HND DEXA RM1; HND ZACH RM1 [facility] Mammography - [place]     In 1 week HND ZACH RM1; HND DEXA RM1 [facility] DEXA - [place]     In 4 months Angelina Boothe DPM [facility] diabetic foot care. past pt of dr montague                    Passed - CMP within the past 12 months        Passed - Calcium level between 8.3 and 10.3     Lab Results   Component Value Date    CA 9.7 01/24/2025               Passed - GFR level greater than 35     GFR  Evaluation  EGFRCR: 81 , resulted on 1/24/2025          Passed - Medication is active on med list               Future Appointments         Provider Department Appt Notes    In 1 week HND DEXA RM1; HND ZACH RM1 [facility] Mammography - [place]     In 1 week HND ZACH RM1; HND DEXA RM1 [facility] DEXA - [place]     In 4 months Angelina Boothe DPM [facility] diabetic foot care. past pt of dr montague          Recent Outpatient Visits              1 week ago Type 2 diabetes mellitus without complication, without long-term current use of insulin ([facility])    [facility] Arl[facility], [facility]    Office Visit    7 months ago Pain and swelling of right lower leg    [facility], Niya Nam MD    Office Visit    7 months ago Gait instability    [facility] [place], Eric Hal, DPM    Office Visit    11 months ago Encounter for annual health examination    [facility]    Office Visit    1 year ago Type 2 diabetes mellitus with hyperosmolarity without coma, without long-term current use of insulin ([facility])    [facility]    [facility]

## 2025-02-10 ENCOUNTER — HOSPITAL ENCOUNTER (OUTPATIENT)
Dept: BONE DENSITY | Age: 75
Discharge: HOME | End: 2025-02-10
Attending: INTERNAL MEDICINE
Payer: MEDICARE

## 2025-02-10 ENCOUNTER — HOSPITAL ENCOUNTER (OUTPATIENT)
Dept: MAMMOGRAPHY | Age: 75
Discharge: HOME | End: 2025-02-10
Attending: INTERNAL MEDICINE
Payer: MEDICARE

## 2025-02-10 DIAGNOSIS — Z78.0 MENOPAUSE: ICD-10-CM

## 2025-02-10 DIAGNOSIS — Z12.31 ENCOUNTER FOR SCREENING MAMMOGRAM FOR MALIGNANT NEOPLASM OF BREAST: ICD-10-CM

## 2025-02-10 PROCEDURE — 77080 DXA BONE DENSITY AXIAL: CPT | Performed by: INTERNAL MEDICINE

## 2025-02-10 PROCEDURE — 77063 BREAST TOMOSYNTHESIS BI: CPT | Performed by: INTERNAL MEDICINE

## 2025-02-10 PROCEDURE — 77067 SCR MAMMO BI INCL CAD: CPT | Performed by: INTERNAL MEDICINE

## 2025-03-11 ENCOUNTER — OFFICE VISIT (OUTPATIENT)
Dept: INTERNAL MEDICINE CLINIC | Facility: CLINIC | Age: 75
End: 2025-03-11
Payer: MEDICARE

## 2025-03-11 VITALS
DIASTOLIC BLOOD PRESSURE: 70 MMHG | WEIGHT: 243 LBS | TEMPERATURE: 98 F | HEIGHT: 62 IN | BODY MASS INDEX: 44.72 KG/M2 | SYSTOLIC BLOOD PRESSURE: 135 MMHG

## 2025-03-11 DIAGNOSIS — N30.00 ACUTE CYSTITIS WITHOUT HEMATURIA: Primary | ICD-10-CM

## 2025-03-11 LAB
APPEARANCE: CLEAR
BILIRUBIN: NEGATIVE
GLUCOSE (URINE DIPSTICK): NEGATIVE MG/DL
KETONES (URINE DIPSTICK): NEGATIVE MG/DL
LEUKOCYTES: NEGATIVE
MULTISTIX LOT#: ABNORMAL NUMERIC
NITRITE, URINE: POSITIVE
OCCULT BLOOD: NEGATIVE
PH, URINE: 5.5 (ref 4.5–8)
PROTEIN (URINE DIPSTICK): NEGATIVE MG/DL
SPECIFIC GRAVITY: 1.03 (ref 1–1.03)
URINE-COLOR: YELLOW
UROBILINOGEN,SEMI-QN: 0.2 MG/DL (ref 0–1.9)

## 2025-03-11 PROCEDURE — 1159F MED LIST DOCD IN RCRD: CPT | Performed by: INTERNAL MEDICINE

## 2025-03-11 PROCEDURE — 81002 URINALYSIS NONAUTO W/O SCOPE: CPT | Performed by: INTERNAL MEDICINE

## 2025-03-11 PROCEDURE — 1126F AMNT PAIN NOTED NONE PRSNT: CPT | Performed by: INTERNAL MEDICINE

## 2025-03-11 PROCEDURE — 99214 OFFICE O/P EST MOD 30 MIN: CPT | Performed by: INTERNAL MEDICINE

## 2025-03-11 RX ORDER — CYCLOBENZAPRINE HCL 5 MG
5 TABLET ORAL 2 TIMES DAILY PRN
Qty: 30 TABLET | Refills: 0 | Status: SHIPPED | OUTPATIENT
Start: 2025-03-11

## 2025-03-11 RX ORDER — CIPROFLOXACIN 500 MG/1
500 TABLET, FILM COATED ORAL 2 TIMES DAILY
Qty: 14 TABLET | Refills: 0 | Status: SHIPPED | OUTPATIENT
Start: 2025-03-11

## 2025-03-11 NOTE — PROGRESS NOTES
Subjective:     Patient ID: Ivonne Israel is a 75 year old female.    Flank Pain         History/Other:   She came in today with 2 complaints according to her for last 1 week she is having urinary urgency and frequency and she feels left flank pain.  Denies any fever or chills.    She is also complaining of lower back pain mostly on the left side which is worse with certain movement with bending and walking  Review of Systems   Constitutional: Negative.    HENT: Negative.     Eyes: Negative.    Respiratory: Negative.     Cardiovascular: Negative.    Gastrointestinal: Negative.    Genitourinary:  Positive for flank pain.   Musculoskeletal:  Positive for back pain.   Skin: Negative.    Neurological: Negative.    Hematological: Negative.    Psychiatric/Behavioral: Negative.       Current Outpatient Medications   Medication Sig Dispense Refill    cyclobenzaprine 5 MG Oral Tab Take 1 tablet (5 mg total) by mouth 2 (two) times daily as needed for Muscle spasms. 30 tablet 0    ciprofloxacin (CIPRO) 500 MG Oral Tab Take 1 tablet (500 mg total) by mouth 2 (two) times daily. 14 tablet 0    alendronate 70 MG Oral Tab Take 1 tablet (70 mg total) by mouth once a week. 12 tablet 0    pravastatin 20 MG Oral Tab Take 1 tablet (20 mg total) by mouth nightly. 90 tablet 0    clopidogrel 75 MG Oral Tab Take 1 tablet (75 mg total) by mouth daily. 90 tablet 3    lisinopril 20 MG Oral Tab Take 1 tablet (20 mg total) by mouth 2 (two) times daily. 180 tablet 3    metFORMIN 500 MG Oral Tab Take 1 tablet (500 mg total) by mouth Before Dinner. 90 tablet 3    amLODIPine 10 MG Oral Tab Take 1 tablet (10 mg total) by mouth daily. 90 tablet 3    Ferrous Sulfate 325 (65 Fe) MG Oral Tab Take 1 tablet (325 mg total) by mouth daily. 90 tablet 0    Coenzyme Q10 (CO Q10) 100 MG Oral Cap Take 1 tablet by mouth daily.      ONETOUCH ULTRA In Vitro Strip USE ONCE DAILY 100 strip 3    aspirin 81 MG Oral Tab EC Take 1 tablet (81 mg total) by mouth daily.       ONETOUCH DELICA PLUS [id number] Does not apply Misc USE TO TEST BLOOD SUGAR EVERY  each 3    Blood Glucose Monitoring Suppl (ONETOUCH ULTRA 2) w/Device Does not apply Kit Test daily 1 kit 0    MAGNESIUM OR Take by mouth daily.        VITAMIN D OR Take 5,000 Units by mouth daily.        Cyanocobalamin (VITAMIN B 12) 100 MCG Oral Lozenge Take 1,000 mg by mouth daily.      Turmeric Curcumin 500 MG Oral Cap Take 1 tablet by mouth 2 (two) times daily.         Allergies:Allergies[1]    Past Medical History:    Acute ischemic left MCA stroke (HCC)    Acute ischemic stroke (HCC)    Acute, but ill-defined, cerebrovascular disease    Arthritis    Arthritis of carpometacarpal (CMC) joint of right thumb    Cancer (HCC)    Uterine    Cancer determined by uterine cervix biopsy (HCC)    Diabetes (HCC)    Essential hypertension    High blood pressure    High cholesterol    History of total knee arthroplasty, left    Hyperlipidemia    Incontinence    bladder    Obesity    Osteoarthritis    Sleep apnea    no CPAP use    Stroke (HCC)    Visual impairment    readers      Past Surgical History:   Procedure Laterality Date    Brain surgery  2012    fluid leaking from brain     Carpal tunnel release Bilateral 2000    Colonoscopy      Colonoscopy N/A 6/10/2019    Procedure: COLONOSCOPY;  Surgeon: David Kelly MD;  Location: [facility] ENDOSCOPY    Colonoscopy      Colonoscopy N/A 9/27/2024    Procedure: COLONOSCOPY;  Surgeon: David Kelly MD;  Location: [facility] ENDOSCOPY    Hysterectomy  2009    Knee surgery Left 12/13/2019    left total knee arthroplasty    Shoulder arthroscopy  2010 and 2012    RCR      Family History   Problem Relation Age of Onset    Uterine Cancer Self     Other (Other) Mother         SLE    Diabetes Father     Heart Disorder Father         CHF, Cardiac arrest    Pancreatic Cancer Brother     Cancer Brother 65        pancreatic      Social History:   Social History     Socioeconomic History    Marital status:  Single   Tobacco Use    Smoking status: Former     Current packs/day: 0.00     Types: Cigarettes    Smokeless tobacco: Never   Vaping Use    Vaping status: Never Used   Substance and Sexual Activity    Alcohol use: No    Drug use: No   Other Topics Concern    Caffeine Concern Yes    Exercise No     Social Drivers of Health     Food Insecurity: No Food Insecurity (3/3/2025)    Received from [facility]    Hunger Vital Sign     Worried About Running Out of Food in the Last Year: Never true     Ran Out of Food in the Last Year: Never true   Transportation Needs: No Transportation Needs (3/3/2025)    Received from [facility]    PRAPARE - Transportation     Lack of Transportation (Medical): No     Lack of Transportation (Non-Medical): No   Housing Stability: Unknown (3/3/2025)    Received from [facility]    Housing Stability Vital Sign     Unable to Pay for Housing in the Last Year: No        Objective:   Physical Exam  Vitals and nursing note reviewed.   Constitutional:       Appearance: Normal appearance.   HENT:      Head: Normocephalic and atraumatic.   Cardiovascular:      Rate and Rhythm: Normal rate and regular rhythm.      Pulses: Normal pulses.      Heart sounds: Normal heart sounds.   Pulmonary:      Effort: Pulmonary effort is normal.      Breath sounds: Normal breath sounds.   Musculoskeletal:         General: Normal range of motion.      Cervical back: Normal range of motion and neck supple.   Skin:     General: Skin is warm.   Neurological:      Mental Status: She is alert. Mental status is at baseline.   Psychiatric:         Mood and Affect: Mood normal.         Assessment & Plan:   1. Acute cystitis without hematuria    UA noted I will send urine for culture, drink more fluids  2   lower back painCareful with back. Correct lifting with legs and not with back. Turn body completely to lift something from side. Back exercises. Correct posture  when sitting with feet flat on floor and back against chair and computer at eye level.  Flexeril twice daily as needed discussed about side effects ibuprofen as needed  Orders Placed This Encounter   Procedures    POC Urinalysis, Manual Dip without microscopy [23120]    Urine Culture, Routine       Meds This Visit:  Requested Prescriptions     Signed Prescriptions Disp Refills    cyclobenzaprine 5 MG Oral Tab 30 tablet 0     Sig: Take 1 tablet (5 mg total) by mouth 2 (two) times daily as needed for Muscle spasms.    ciprofloxacin (CIPRO) 500 MG Oral Tab 14 tablet 0     Sig: Take 1 tablet (500 mg total) by mouth 2 (two) times daily.       Imaging & Referrals:  None            [1] No Known Allergies

## 2025-03-18 ENCOUNTER — HOSPITAL ENCOUNTER (EMERGENCY)
Facility: HOSPITAL | Age: 75
Discharge: HOME | End: 2025-03-19
Attending: EMERGENCY MEDICINE
Payer: MEDICARE

## 2025-03-18 ENCOUNTER — MED REC SCAN ONLY (OUTPATIENT)
Dept: INTERNAL MEDICINE CLINIC | Facility: CLINIC | Age: 75
End: 2025-03-18

## 2025-03-18 ENCOUNTER — APPOINTMENT (OUTPATIENT)
Dept: CT IMAGING | Facility: HOSPITAL | Age: 75
End: 2025-03-18
Attending: EMERGENCY MEDICINE
Payer: MEDICARE

## 2025-03-18 DIAGNOSIS — R10.9 FLANK PAIN: Primary | ICD-10-CM

## 2025-03-18 DIAGNOSIS — M54.50 BACK PAIN, LUMBOSACRAL: ICD-10-CM

## 2025-03-18 LAB
ALBUMIN SERPL-MCNC: 4.5 G/DL (ref 3.2–4.8)
ALBUMIN/GLOB SERPL: 1.7 {RATIO} (ref 1–2)
ALP LIVER SERPL-CCNC: 66 U/L
ALT SERPL-CCNC: 9 U/L
ANION GAP SERPL CALC-SCNC: 10 MMOL/L (ref 0–18)
AST SERPL-CCNC: 14 U/L (ref ?–34)
BASOPHILS # BLD AUTO: 0.02 X10(3) UL (ref 0–0.2)
BASOPHILS NFR BLD AUTO: 0.6 %
BILIRUB SERPL-MCNC: 0.3 MG/DL (ref 0.2–1.1)
BUN BLD-MCNC: 17 MG/DL (ref 9–23)
CALCIUM BLD-MCNC: 10 MG/DL (ref 8.7–10.6)
CHLORIDE SERPL-SCNC: 110 MMOL/L (ref 98–112)
CO2 SERPL-SCNC: 25 MMOL/L (ref 21–32)
CREAT BLD-MCNC: 0.9 MG/DL
EGFRCR SERPLBLD CKD-EPI 2021: 67 ML/MIN/1.73M2 (ref 60–?)
EOSINOPHIL # BLD AUTO: 0.07 X10(3) UL (ref 0–0.7)
EOSINOPHIL NFR BLD AUTO: 2 %
ERYTHROCYTE [DISTWIDTH] IN BLOOD BY AUTOMATED COUNT: 14.4 %
GLOBULIN PLAS-MCNC: 2.6 G/DL (ref 2–3.5)
GLUCOSE BLD-MCNC: 103 MG/DL (ref 70–99)
HCT VFR BLD AUTO: 43.6 %
HGB BLD-MCNC: 14.2 G/DL
IMM GRANULOCYTES # BLD AUTO: 0.01 X10(3) UL (ref 0–1)
IMM GRANULOCYTES NFR BLD: 0.3 %
LIPASE SERPL-CCNC: 31 U/L (ref 12–53)
LYMPHOCYTES # BLD AUTO: 1.1 X10(3) UL (ref 1–4)
LYMPHOCYTES NFR BLD AUTO: 31.3 %
MCH RBC QN AUTO: 27.7 PG (ref 26–34)
MCHC RBC AUTO-ENTMCNC: 32.6 G/DL (ref 31–37)
MCV RBC AUTO: 85.2 FL
MONOCYTES # BLD AUTO: 0.36 X10(3) UL (ref 0.1–1)
MONOCYTES NFR BLD AUTO: 10.2 %
NEUTROPHILS # BLD AUTO: 1.96 X10 (3) UL (ref 1.5–7.7)
NEUTROPHILS # BLD AUTO: 1.96 X10(3) UL (ref 1.5–7.7)
NEUTROPHILS NFR BLD AUTO: 55.6 %
OSMOLALITY SERPL CALC.SUM OF ELEC: 302 MOSM/KG (ref 275–295)
PLATELET # BLD AUTO: 246 10(3)UL (ref 150–450)
POTASSIUM SERPL-SCNC: 3.9 MMOL/L (ref 3.5–5.1)
PROT SERPL-MCNC: 7.1 G/DL (ref 5.7–8.2)
RBC # BLD AUTO: 5.12 X10(6)UL
SODIUM SERPL-SCNC: 145 MMOL/L (ref 136–145)
WBC # BLD AUTO: 3.5 X10(3) UL (ref 4–11)

## 2025-03-18 PROCEDURE — 99284 EMERGENCY DEPT VISIT MOD MDM: CPT

## 2025-03-18 PROCEDURE — 83690 ASSAY OF LIPASE: CPT

## 2025-03-18 PROCEDURE — 85025 COMPLETE CBC W/AUTO DIFF WBC: CPT

## 2025-03-18 PROCEDURE — 80053 COMPREHEN METABOLIC PANEL: CPT

## 2025-03-18 PROCEDURE — 80053 COMPREHEN METABOLIC PANEL: CPT | Performed by: EMERGENCY MEDICINE

## 2025-03-18 PROCEDURE — 74177 CT ABD & PELVIS W/CONTRAST: CPT | Performed by: EMERGENCY MEDICINE

## 2025-03-18 PROCEDURE — 81003 URINALYSIS AUTO W/O SCOPE: CPT | Performed by: EMERGENCY MEDICINE

## 2025-03-18 PROCEDURE — 83690 ASSAY OF LIPASE: CPT | Performed by: EMERGENCY MEDICINE

## 2025-03-18 PROCEDURE — 85025 COMPLETE CBC W/AUTO DIFF WBC: CPT | Performed by: EMERGENCY MEDICINE

## 2025-03-18 PROCEDURE — 96374 THER/PROPH/DIAG INJ IV PUSH: CPT

## 2025-03-18 RX ORDER — KETOROLAC TROMETHAMINE 15 MG/ML
15 INJECTION, SOLUTION INTRAMUSCULAR; INTRAVENOUS ONCE
Status: COMPLETED | OUTPATIENT
Start: 2025-03-18 | End: 2025-03-18

## 2025-03-19 VITALS
OXYGEN SATURATION: 100 % | HEART RATE: 69 BPM | RESPIRATION RATE: 13 BRPM | DIASTOLIC BLOOD PRESSURE: 72 MMHG | TEMPERATURE: 97 F | HEIGHT: 62 IN | WEIGHT: 242 LBS | SYSTOLIC BLOOD PRESSURE: 154 MMHG | BODY MASS INDEX: 44.53 KG/M2

## 2025-03-19 LAB
BILIRUB UR QL STRIP.AUTO: NEGATIVE
CLARITY UR REFRACT.AUTO: CLEAR
COLOR UR AUTO: YELLOW
GLUCOSE UR STRIP.AUTO-MCNC: NORMAL MG/DL
KETONES UR STRIP.AUTO-MCNC: NEGATIVE MG/DL
LEUKOCYTE ESTERASE UR QL STRIP.AUTO: NEGATIVE
NITRITE UR QL STRIP.AUTO: NEGATIVE
PH UR STRIP.AUTO: 5 [PH] (ref 5–8)
PROT UR STRIP.AUTO-MCNC: NEGATIVE MG/DL
RBC UR QL AUTO: NEGATIVE
SP GR UR STRIP.AUTO: >1.03 (ref 1–1.03)
UROBILINOGEN UR STRIP.AUTO-MCNC: NORMAL MG/DL

## 2025-03-19 RX ORDER — TRAMADOL HYDROCHLORIDE 50 MG/1
50 TABLET ORAL EVERY 8 HOURS PRN
Qty: 12 TABLET | Refills: 0 | Status: SHIPPED | OUTPATIENT
Start: 2025-03-19

## 2025-03-19 NOTE — ED PROVIDER NOTES
Patient Seen in: [facility] Emergency Department      History     Chief Complaint   Patient presents with    Abdomen/Flank Pain     Stated Complaint: left flank pain    Subjective:   HPI      The patient is a 75-year-old female presents emergency room with history of some left low back pain and left flank pain which has been ongoing over the last week or so.  The patient was diagnosed with a urinary tract infection previously was given prescription for antibiotics and also a muscle relaxant.  The patient that she finished the course of the antibiotic she is feeling better yesterday but today the pain returned.  The patient's pain is well localized to the left side of the low back and into the left flank and is worse with movement and change of position.  The patient denies any weakness or numbness to the arms or legs.  The patient denies history of any chest pain or shortness of breath.  The patient denies history of any fever.  The patient denies abdominal pain.  The patient denies history of any other somatic complaints or discomfort at this time.    Objective:     Past Medical History:    Acute ischemic left MCA stroke (HCC)    Acute ischemic stroke (HCC)    Acute, but ill-defined, cerebrovascular disease    Arthritis    Arthritis of carpometacarpal (CMC) joint of right thumb    Cancer (HCC)    Uterine    Cancer determined by uterine cervix biopsy (HCC)    Diabetes (HCC)    Essential hypertension    High blood pressure    High cholesterol    History of total knee arthroplasty, left    Hyperlipidemia    Incontinence    bladder    Obesity    Osteoarthritis    Sleep apnea    no CPAP use    Stroke (HCC)    Visual impairment    readers              Past Surgical History:   Procedure Laterality Date    Brain surgery  2012    fluid leaking from brain     Carpal tunnel release Bilateral 2000    Colonoscopy      Colonoscopy N/A 6/10/2019    Procedure: COLONOSCOPY;  Surgeon: David Kelly MD;  Location: [facility]  ENDOSCOPY    Colonoscopy      Colonoscopy N/A 9/27/2024    Procedure: COLONOSCOPY;  Surgeon: David Kelly MD;  Location: [facility] ENDOSCOPY    Hysterectomy  2009    Knee surgery Left 12/13/2019    left total knee arthroplasty    Shoulder arthroscopy  2010 and 2012    RCR                Social History     Socioeconomic History    Marital status: Single   Tobacco Use    Smoking status: Former     Current packs/day: 0.00     Types: Cigarettes    Smokeless tobacco: Never   Vaping Use    Vaping status: Never Used   Substance and Sexual Activity    Alcohol use: No    Drug use: No   Other Topics Concern    Caffeine Concern Yes    Exercise No     Social Drivers of Health     Food Insecurity: No Food Insecurity (3/3/2025)    Received from [facility]    Hunger Vital Sign     Worried About Running Out of Food in the Last Year: Never true     Ran Out of Food in the Last Year: Never true   Transportation Needs: No Transportation Needs (3/3/2025)    Received from [facility]    PRAPARE - Transportation     Lack of Transportation (Medical): No     Lack of Transportation (Non-Medical): No   Housing Stability: Unknown (3/3/2025)    Received from [facility]    Housing Stability Vital Sign     Unable to Pay for Housing in the Last Year: No                  Physical Exam     ED Triage Vitals [03/18/25 2057]   /83   Pulse 76   Resp 18   Temp 96.8 °F (36 °C)   Temp src    SpO2 97 %   O2 Device None (Room air)       Current Vitals:   Vital Signs  BP: (!) 161/118  Pulse: 62  Resp: 13  Temp: 96.8 °F (36 °C)  MAP (mmHg): (!) 133    Oxygen Therapy  SpO2: 100 %  O2 Device: None (Room air)        Physical Exam  GENERAL: Well-developed, well-nourished female sitting up breathing easily in no apparent distress.  Patient is nontoxic in appearance.  HEENT: Head is normocephalic, atraumatic. Pupils are 4 mm equally round and reactive to light. Oropharynx is clear. Mucous  membranes are moist.  NECK: There is no focal tenderness to palpation appreciated.   LUNGS: Clear to auscultation bilaterally with no wheeze. There is good equal air entry bilaterally.  HEART: Regular rate and rhythm. Normal S1, S2 no S3, or S4. No murmur.  ABDOMEN: There is no focal tenderness to palpation appreciated anywhere throughout the abdomen. There is no guarding, no rebound, no mass, and no organomegaly appreciated. There is normoactive bowel sounds. There is no hernia.  There is some mild tenderness over the left CVA appreciated.  BACK: There is no focal midline tenderness palpation throughout the thoracic or lumbar spinous processes.  There is some paraspinal tenderness to palpation in the area of the lumbar spine the left.  There is no overlying ecchymosis or rash appreciated.  EXTREMITIES: There is no cyanosis, clubbing, or edema appreciated. Pulses are 2+ and equal in all 4 extremities.  There is no foot drop appreciated.  NEURO: Patient is awake, alert and oriented to time place and person. Motor strength is 5 over 5 in all 4 extremities. There are no gross motor or sensory deficits appreciated.  Patient answering all questions appropriately.        ED Course     Labs Reviewed   COMP METABOLIC PANEL (14) - Abnormal; Notable for the following components:       Result Value    Glucose 103 (*)     Calculated Osmolality 302 (*)     ALT 9 (*)     All other components within normal limits   CBC WITH DIFFERENTIAL WITH PLATELET - Abnormal; Notable for the following components:    WBC 3.5 (*)     All other components within normal limits   URINALYSIS WITH CULTURE REFLEX - Abnormal; Notable for the following components:    Spec Gravity >1.030 (*)     All other components within normal limits   LIPASE - Normal            CT ABDOMEN+PELVIS(CONTRAST ONLY)(CPT=74177)    Result Date: 3/18/2025  CONCLUSION:  No evidence of a kidney stone or hydronephrosis.  No acute abdominal pelvic process.   LOCATION:  [facility]    Dictated by (CST): Andrew Steele MD on 3/18/2025 at 11:45 PM     Finalized by (CST): Andrew Steele MD on 3/18/2025 at 11:50 PM             [facility]        1:01 patient sitting back and breathing easily in no apparent distress this time.  Patient with no other new complaints at this time.  Will discharge to home at this time.        Medical Decision Making  Patient had IV line established blood work drawn including a CBC, chemistries, BUN/creatinine, and blood sugar all of which are unremarkable.  Liver function test found to be negative.  Lipase found to be normal.  Urinalysis is unremarkable.  The patient is sitting back and breathing easily in no apparent distress on initial exam and repeat examination here in the ER.  Patient given IV Toradol for symptoms here in the emergency room.  Patient's pain is worse with movement and specifically with sitting up and changing position.  The patient has no evidence of any acute findings noted on CT scan and no evidence of any urinary tract infection or acute flank pathology.  Differential diagnosis considered by myself includes but is not limited to acute pyelonephritis, acute ureterolithiasis, acute musculoskeletal pain.  Patient has no acute findings noted on CT scan.  The patient's results discussed with the patient the patient will be discharged to home at this time the patient will need follow-up with spine specialist as well as her primary care physician.  Patient instructed to return to the ER immediately if symptoms worsen or if any other problems arise.  Patient given prescription for tramadol for symptoms at home and instructions to return to the ER if any other problems arise.  Patient discharged to home at this time.    Disposition and Plan     Clinical Impression:  1. Flank pain    2. Back pain, lumbosacral         Disposition:  Discharge  3/19/2025  1:04 am    Follow-up:  [facility], [address] [place] [address]  [place] [id number] [phone]  Call  choose option 2 for neurosurgery or pain follow up          Medications Prescribed:  Current Discharge Medication List        START taking these medications    Details   traMADol 50 MG Oral Tab Take 1 tablet (50 mg total) by mouth every 8 (eight) hours as needed for Pain.  Qty: 12 tablet, Refills: 0    Associated Diagnoses: Back pain, lumbosacral                 Supplementary Documentation:

## 2025-03-19 NOTE — DISCHARGE INSTRUCTIONS
Rest at home  Tylenol for pain at home  Return to the ER if symptoms worsen or if any other problems arise

## 2025-03-19 NOTE — ED INITIAL ASSESSMENT (HPI)
Patient reports left low back pain and left side pain. States last week she was diagnosed with a UTI and muscle spasms. Patient was prescribed a muscle relaxant and abx. Finished the course of abx. States was feeling better yesterday, but today the pain worsened. No fevers.

## 2025-03-20 ENCOUNTER — PATIENT OUTREACH (OUTPATIENT)
Dept: CASE MANAGEMENT | Age: 75
End: 2025-03-20

## 2025-03-20 NOTE — PROGRESS NOTES
ED follow up.    Niya Alfred MD  Internal Medicine  [address] [address]  [phone]    Attempt #1:  Left message on voicemail for patient to call transitions specialist back to schedule follow up appointments. Provided Transitions specialist scheduling phone number [phone].

## 2025-03-21 NOTE — PROGRESS NOTES
ED discharge 3/19 [facility]     Niya Alfred MD  Internal Medicine  [address] [id number] [phone]   DECLINED - pt states that she doesn't need a follow-up appt at this time        Closing encounter

## 2025-03-31 RX ORDER — AMLODIPINE BESYLATE 10 MG/1
10 TABLET ORAL DAILY
Qty: 90 TABLET | Refills: 3 | Status: SHIPPED | OUTPATIENT
Start: 2025-03-31

## 2025-04-07 RX ORDER — PRAVASTATIN SODIUM 20 MG
20 TABLET ORAL NIGHTLY
Qty: 90 TABLET | Refills: 3 | Status: SHIPPED | OUTPATIENT
Start: 2025-04-07

## 2025-04-07 NOTE — TELEPHONE ENCOUNTER
Refill Per Protocol     Requested Prescriptions   Pending Prescriptions Disp Refills    PRAVASTATIN 20 MG Oral Tab [Pharmacy Med Name: PRAVASTATIN SODIUM 20 MG TAB] 90 tablet 0     Sig: TAKE 1 TABLET BY MOUTH EVERY DAY AT NIGHT       Cholesterol Medication Protocol Passed - 4/7/2025  2:32 PM        Passed - ALT < 80     Lab Results   Component Value Date    ALT 9 (L) 03/18/2025             Passed - ALT resulted within past year        Passed - Lipid panel within past 12 months     Lab Results   Component Value Date    CHOLEST 155 01/24/2025    TRIG 79 01/24/2025    HDL 61 01/24/2025    LDL 78 01/24/2025    VLDL 14 08/21/2020    TCHDLRATIO 2.5 01/24/2025    NONHDLC 94 01/24/2025             Passed - In person appointment or virtual visit in the past 12 mos or appointment in next 3 mos     Recent Outpatient Visits              3 weeks ago [facility] hemat[facility]    Office Visit    2 months ago Type 2 diabetes mellitus without complication, without long-term current use of insulin ([facility])    [facility]    Office Visit    9 months ago [facility] and swelling of right lower [facility]    Office Visit    9 months ago Gait instability    [facility] [place], Eric Hal, DPM    Office Visit    1 year ago Encounter for annual health examination    [facility]    [facility] Visit          Future Appointments         Provider Department Appt Notes    In 3 days Radha Morales APRN [facility] NP seen in ED, CT in chart, back pain    In 2 months Angelina Boothe DPM [facility] diabetic foot care. past pt of dr montague                     Passed - Medication is active on med list

## 2025-04-09 ENCOUNTER — MED REC SCAN ONLY (OUTPATIENT)
Dept: INTERNAL MEDICINE CLINIC | Facility: CLINIC | Age: 75
End: 2025-04-09

## 2025-04-10 ENCOUNTER — OFFICE VISIT (OUTPATIENT)
Dept: SURGERY | Facility: CLINIC | Age: 75
End: 2025-04-10
Payer: MEDICARE

## 2025-04-10 VITALS
SYSTOLIC BLOOD PRESSURE: 126 MMHG | HEART RATE: 89 BPM | DIASTOLIC BLOOD PRESSURE: 80 MMHG | OXYGEN SATURATION: 99 % | BODY MASS INDEX: 44.53 KG/M2 | WEIGHT: 242 LBS | HEIGHT: 62 IN

## 2025-04-10 DIAGNOSIS — M54.50 CHRONIC BILATERAL LOW BACK PAIN WITHOUT SCIATICA: Primary | ICD-10-CM

## 2025-04-10 DIAGNOSIS — G89.29 CHRONIC BILATERAL LOW BACK PAIN WITHOUT SCIATICA: Primary | ICD-10-CM

## 2025-04-10 PROCEDURE — 1159F MED LIST DOCD IN RCRD: CPT

## 2025-04-10 PROCEDURE — 1160F RVW MEDS BY RX/DR IN RCRD: CPT

## 2025-04-10 PROCEDURE — 99203 OFFICE O/P NEW LOW 30 MIN: CPT

## 2025-04-10 NOTE — PROGRESS NOTES
[facility]  Neurosurgery Consultation  April 10, 2025    Ivonne Israel PCP:  SHARA JHAVERI MD    1950 MRN [id number]     Reason for Visit: Chronic low back pain      HPI     Ivonne Israel is a very pleasant 75 year old female who presents today for Neurosurgical consultation for chronic low back pain.     Patient reports a 5-year history of intermittent LBP without lower extremity radiculopathy. The current flare-up began ~4 weeks ago without trauma or inciting event. She reports non-radiating, left sided LBP. Pain is aggravated by prolonged sitting and standing. Rates pain 1/10 in severity at present. Pain does not vary based on time of day. She has no numbness, tingling, or weakness of LE.  No changes in bowel/bladder habits or saddle anesthesia. She has ambulated with a cane outside of the home for several years. She presented to ED on 3/18/25 with c/o severe left flank pain and was worked up for renal stones, which was negative. She was prescribed Tramadol for presumed musculoskeletal LBP, which did offer relief. Spine imaging was not completed in ED. No prior PT, pain services, or spine surgeries.     HISTORY     Past Medical History[1]   Past Surgical History[2]   Family History[3]   Social Hx on file[4]   Allergies[5]     CURRENT MEDICATIONS     Current Medications[6]     REVIEW OF SYSTEMS     Comprehensive review of systems done. Negative except what is outlined in the above HPI.     PHYSICAL EXAMIMATION     VITALS:  height is 62\" and weight is 242 lb (109.8 kg). Her blood pressure is 126/80 and her pulse is 89. Her oxygen saturation is 99%.     GENERAL: No apparent distress, non-toxic appearing. Sitting comfortably in examination chair.     MUSCULOSKELETAL: Even tone. Moving bilateral upper and lower extremities spontaneously and against resistance.    SKIN: Warm, dry.     NEURO: Alert and oriented x3.  Face is symmetric.       SPINE:  Gait/Coordination:  Gait intact,  non-antalgic. Ambulates with cane.   Reports equal strength with toe and heel balance bilaterally.    Sensation:   Sensation to light touch intact to bilateral upper and lower extremities.    ROM:   Lumbar Flexion   Pain free   Lumbar Extension (+) Pain    Lumbar Rotation  Pain free    Palpation:   Non-tender to palpation of midline lumbar spine .   Non-tender to palpation of right and left lumbar paraspinal muscles.    LOWER EXTREMITY STRENGTH:    Iliospoas  Hamstrings  Quads  D-flexion  P-flexion EHL     Right 5 5 5 5 5 5     Left 5 5 5 5 5 5     UPPER EXTREMITY STRENGTH:    Deltoid  Biceps  Triceps  Wrist   Flexion  Wrist Extension       Right 5 5 5 5  5 5     Left 5 5 5 5 5 5     DTRs:     Biceps    Triceps   Brachioradialis     Patellar     Ankle     Right       2+         2+            2+         2+        2+     Left       2+         2+             2+         2+        2+      IMAGING:     No new imaging to review.    MEDICAL DECISION MAKING:     ASSESSMENT:  1. Chronic bilateral low back pain without sciatica      Patient presents with an acute exacerbation of chronic bilateral low back pain. She currently has no radicular symptoms or weakness of the lower extremities. She is ambulatory with a steady gait with a cane, which is her baseline.  Her pain has in fact improved since her ED visit on 3/18/2025. Her clinical picture suggests musculoskeletal back pain, for which she will be referred to Physiatry for conservative management.     PLAN:   XR lumbar spine with flex/ext. Will send [employer] message with results once available.  Referral to Physiatry for conservative management.   Patient declines PT or medication therapy at this time.  F/u as needed.    I reviewed the plan of care with the patient at length. All questions and concerns were addressed at this time. The patient verbalized understanding, agrees with the plan, and was appreciative.    Total visit time: 30 minutes; More than 50% spent  coordinating care, counseling, reviewing imaging and discussing medication therapy.     Radha Morales, MSN, APRN, FNP-[facility]  Neurosurgery   [address], [address]  [place] [address]  [phone]          [1]   Past Medical History:   Acute ischemic left MCA stroke (HCC)    Acute ischemic stroke (HCC)    Acute, but ill-defined, cerebrovascular disease    Arthritis    Arthritis of carpometacarpal (CMC) joint of right thumb    Cancer (HCC)    Uterine    Cancer determined by uterine cervix biopsy (HCC)    Diabetes (HCC)    Essential hypertension    High blood pressure    High cholesterol    History of total knee arthroplasty, left    Hyperlipidemia    Incontinence    bladder    Obesity    Osteoarthritis    Sleep apnea    no CPAP use    Stroke (HCC)    Visual impairment    readers   [2]   Past Surgical History:  Procedure Laterality Date    Brain surgery  2012    fluid leaking from brain     Carpal tunnel release Bilateral 2000    Colonoscopy      Colonoscopy N/A 6/10/2019    Procedure: COLONOSCOPY;  Surgeon: David Kelly MD;  Location: [facility] ENDOSCOPY    Colonoscopy      Colonoscopy N/A 9/27/2024    Procedure: COLONOSCOPY;  Surgeon: David Kelly MD;  Location: [facility] ENDOSCOPY    Hysterectomy  2009    Knee surgery Left 12/13/2019    left total knee arthroplasty    Shoulder arthroscopy  2010 and 2012    RCR   [3]   Family History  Problem Relation Age of Onset    Uterine Cancer Self     Other (Other) Mother         SLE    Diabetes Father     Heart Disorder Father         CHF, Cardiac arrest    Pancreatic Cancer Brother     Cancer Brother 65        pancreatic   [4]   Social History  Socioeconomic History    Marital status: Single   Tobacco Use    Smoking status: Former     Current packs/day: 0.00     Types: Cigarettes    Smokeless tobacco: Never   Vaping Use    Vaping status: Never Used   Substance and Sexual Activity    Alcohol use: No    Drug use: No   Other Topics  Concern    Caffeine Concern Yes    Exercise No   [5] No Known Allergies  [6]   Current Outpatient Medications   Medication Sig Dispense Refill    pravastatin 20 MG Oral Tab Take 1 tablet (20 mg total) by mouth nightly. 90 tablet 3    amLODIPine 10 MG Oral Tab Take 1 tablet (10 mg total) by mouth daily. 90 tablet 3    alendronate 70 MG Oral Tab Take 1 tablet (70 mg total) by mouth once a week. 12 tablet 0    clopidogrel 75 MG Oral Tab Take 1 tablet (75 mg total) by mouth daily. 90 tablet 3    lisinopril 20 MG Oral Tab Take 1 tablet (20 mg total) by mouth 2 (two) times daily. 180 tablet 3    metFORMIN 500 MG Oral Tab Take 1 tablet (500 mg total) by mouth Before Dinner. 90 tablet 3    Ferrous Sulfate 325 (65 Fe) MG Oral Tab Take 1 tablet (325 mg total) by mouth daily. 90 tablet 0    Coenzyme Q10 (CO Q10) 100 MG Oral Cap Take 1 tablet by mouth daily.      ONETOUCH ULTRA In Vitro Strip USE ONCE DAILY 100 strip 3    aspirin 81 MG Oral Tab EC Take 1 tablet (81 mg total) by mouth daily.      ONETOUCH DELICA PLUS FAGCLB14H Does not apply Misc USE TO TEST BLOOD SUGAR EVERY  each 3    Blood Glucose Monitoring Suppl (ONETOUCH ULTRA 2) w/Device Does not apply Kit Test daily 1 kit 0    MAGNESIUM OR Take by mouth daily.        VITAMIN D OR Take 5,000 Units by mouth daily.        Cyanocobalamin (VITAMIN B 12) 100 MCG Oral Lozenge Take 1,000 mg by mouth daily.      Turmeric Curcumin 500 MG Oral Cap Take 1 tablet by mouth 2 (two) times daily.        traMADol 50 MG Oral Tab Take 1 tablet (50 mg total) by mouth every 8 (eight) hours as needed for Pain. (Patient not taking: Reported on 4/10/2025) 12 tablet 0    cyclobenzaprine 5 MG Oral Tab Take 1 tablet (5 mg total) by mouth 2 (two) times daily as needed for Muscle spasms. (Patient not taking: Reported on 4/10/2025) 30 tablet 0    ciprofloxacin (CIPRO) 500 MG Oral Tab Take 1 tablet (500 mg total) by mouth 2 (two) times daily. (Patient not taking: Reported on 4/10/2025) 14 tablet  0

## 2025-04-10 NOTE — PATIENT INSTRUCTIONS
Refill policies:    Allow 2-3 business days for refills; controlled substances may take longer.  Contact your pharmacy at least 5 days prior to running out of medication and have them send an electronic request or submit request through the “request refill” option in your [employer] account.  Refills are not addressed on weekends; covering physicians do not authorize routine medications on weekends.  No narcotics or controlled substances are refilled after noon on Fridays or by on call physicians.  By law, narcotics must be electronically prescribed.  A 30 day supply with no refills is the maximum allowed.  If your prescription is due for a refill, you may be due for a follow up appointment.  To best provide you care, patients receiving routine medications need to be seen at least once a year.  Patients receiving narcotic/controlled substance medications need to be seen at least once every 3 months.  In the event that your preferred pharmacy does not have the requested medication in stock (e.g. Backordered), it is your responsibility to find another pharmacy that has the requested medication available.  We will gladly send a new prescription to that pharmacy at your request.    Scheduling Tests:    If your physician has ordered radiology tests such as MRI or CT scans, please contact Central Scheduling at [phone] right away to schedule the test.  Once scheduled, the [facility] Centralized Referral Team will work with your insurance carrier to obtain pre-certification or prior authorization.  Depending on your insurance carrier, approval may take 3-10 days.  It is highly recommended patients assure they have received an authorization before having a test performed.  If test is done without insurance authorization, patient may be responsible for the entire amount billed.      Precertification and Prior Authorizations:  If your physician has recommended that you have a procedure or additional testing performed the [facility]  Centralized Referral Team will contact your insurance carrier to obtain pre-certification or prior authorization.    You are strongly encouraged to contact your insurance carrier to verify that your procedure/test has been approved and is a COVERED benefit.  Although the [facility] Centralized Referral Team does its due diligence, the insurance carrier gives the disclaimer that \"Although the procedure is authorized, this does not guarantee payment.\"    Ultimately the patient is responsible for payment.   Thank you for your understanding in this matter.  Paperwork Completion:  If you require FMLA or disability paperwork for your recovery, please make sure to either drop it off or have it faxed to our office at [phone]. Be sure the form has your name and date of birth on it.  The form will be faxed to our Forms Department and they will complete it for you.  There is a 25$ fee for all forms that need to be filled out.  Please be aware there is a 10-14 day turnaround time.  You will need to sign a release of information (JV) form if your paperwork does not come with one.  You may call the Forms Department with any questions at [phone].  Their fax number is [phone].

## 2025-04-10 NOTE — PROGRESS NOTES
New patient:  Reason for visit:   ED 03/18/25-left sided low back pain, pt denies any pain or n/t down legs   Pt reports ongoing back pain for 5+ years, recent flare up started about 4 weeks ago    Numeric Rating Scale:         Pain at Present: 1/10                                                                                                            Past Treatments for Current Pain Condition:     No PT or injections     Prior diagnostic testing related to this condition:    No imaging

## 2025-04-15 ENCOUNTER — HOSPITAL ENCOUNTER (OUTPATIENT)
Dept: GENERAL RADIOLOGY | Facility: HOSPITAL | Age: 75
Discharge: HOME | End: 2025-04-15
Payer: MEDICARE

## 2025-04-15 DIAGNOSIS — M54.50 CHRONIC BILATERAL LOW BACK PAIN WITHOUT SCIATICA: ICD-10-CM

## 2025-04-15 DIAGNOSIS — G89.29 CHRONIC BILATERAL LOW BACK PAIN WITHOUT SCIATICA: ICD-10-CM

## 2025-04-15 PROCEDURE — 72114 X-RAY EXAM L-S SPINE BENDING: CPT

## 2025-04-28 NOTE — TELEPHONE ENCOUNTER
Refill Per Protocol     Requested Prescriptions   Pending Prescriptions Disp Refills    METFORMIN 500 MG Oral Tab [Pharmacy Med Name: METFORMIN  MG TABLET] 90 tablet 2     Sig: Take 1 tablet (500 mg total) by mouth Before Dinner.       Diabetes Medication Protocol Passed - 4/28/2025  1:51 PM        Passed - Last A1C < 7.5 and within past 6 months     Lab Results   Component Value Date    A1C 6.0 (H) 01/24/2025             Passed - In person appointment or virtual visit in the past 6 mos or appointment in next 3 mos     Recent Outpatient Visits              2 weeks ago Chronic bilateral low back [facility] without sciatica    [facility]    Office Visit    1 month ago Acute cystitis without hematuria    [facility]    Office Visit    3 months ago Type 2 diabetes mellitus without complication, without long-term current use of insulin ([facility])    [facility]    Office Visit    10 months ago Pain and swelling of right lower leg    [facility] Niya Alfred MD    Office Visit    10 months ago Gait instability    [facility] Meshulam, Eric Hal, DPM    Office Visit          Future Appointments         Provider Department Appt Notes    In 1 month Angelina Boothe DPM [facility] diabetic foot care. past pt of dr montague                    Passed - Microalbumin procedure in past 12 months or taking ACE/ARB        Passed - EGFRCR or GFRAA > 50     GFR Evaluation  EGFRCR: 67 , resulted on 3/18/2025          Passed - GFR in the past 12 months        Passed - Medication is active on med list

## 2025-04-29 RX ORDER — ALENDRONATE SODIUM 70 MG/1
70 TABLET ORAL WEEKLY
Qty: 12 TABLET | Refills: 3 | Status: SHIPPED | OUTPATIENT
Start: 2025-04-29

## 2025-05-14 RX ORDER — LISINOPRIL 20 MG/1
20 TABLET ORAL 2 TIMES DAILY
Qty: 180 TABLET | Refills: 3 | Status: SHIPPED | OUTPATIENT
Start: 2025-05-14

## 2025-05-30 ENCOUNTER — OFFICE VISIT (OUTPATIENT)
Dept: PHYSICAL MEDICINE AND REHAB | Facility: CLINIC | Age: 75
End: 2025-05-30
Payer: MEDICARE

## 2025-05-30 ENCOUNTER — PATIENT MESSAGE (OUTPATIENT)
Dept: PHYSICAL MEDICINE AND REHAB | Facility: CLINIC | Age: 75
End: 2025-05-30

## 2025-05-30 VITALS
HEIGHT: 62 IN | WEIGHT: 242 LBS | BODY MASS INDEX: 44.53 KG/M2 | OXYGEN SATURATION: 96 % | SYSTOLIC BLOOD PRESSURE: 132 MMHG | DIASTOLIC BLOOD PRESSURE: 62 MMHG | HEART RATE: 86 BPM

## 2025-05-30 DIAGNOSIS — M47.816 LUMBAR SPONDYLOSIS: Primary | ICD-10-CM

## 2025-05-30 PROCEDURE — 1160F RVW MEDS BY RX/DR IN RCRD: CPT | Performed by: PHYSICAL MEDICINE & REHABILITATION

## 2025-05-30 PROCEDURE — 1159F MED LIST DOCD IN RCRD: CPT | Performed by: PHYSICAL MEDICINE & REHABILITATION

## 2025-05-30 PROCEDURE — 99204 OFFICE O/P NEW MOD 45 MIN: CPT | Performed by: PHYSICAL MEDICINE & REHABILITATION

## 2025-05-30 PROCEDURE — 1125F AMNT PAIN NOTED PAIN PRSNT: CPT | Performed by: PHYSICAL MEDICINE & REHABILITATION

## 2025-05-30 RX ORDER — METHYLPREDNISOLONE 4 MG/1
TABLET ORAL
Qty: 1 EACH | Refills: 0 | Status: SHIPPED | OUTPATIENT
Start: 2025-05-30

## 2025-05-30 NOTE — PROGRESS NOTES
NEW PATIENT VISIT    CHIEF COMPLAINT  Low back pain      HISTORY OF PRESENTING ILLNESS    Ivonne Israel is a 75 year old female who presents for evaluation of low back pain. Radha Morales the patient with bilateral low back pain left greater than right.  Denies inciting or injury.  Sharp pain started recently and radiates down into the left abdominal region.  Pain is rated 4/10.  Denies any numbness and tingling or weakness.    Currently using tramadol, Tylenol and cyclobenzaprine.  Denies any physical therapy.  Denies any history of surgeries or injections.  She has an x-ray of the lumbar spine dated 4/15/2025 which is reviewed in full below.    History of Present Illness  Ivonne Israel is a 75 year old female who presents with back pain.    She experiences back pain localized to the left side, exacerbated by walking, moving, getting out of bed, and twisting movements. She uses a four-point cane when outside the house. The pain began after a CT scan of the abdomen and pelvis on March 18th, which was unremarkable. She has not engaged in physical therapy for her back.    Current medications include Tylenol and a muscle relaxer prescribed by Dr. Reis. She received tramadol during her emergency room visit but has since run out. There is no pain on the outside of the hip.   PAST MEDICAL HISTORY  Past Medical History[1]    PAST SURGICAL HISTORY  Past Surgical History[2]    MEDICATIONS  Medications Ordered Prior to Encounter[3]    ALLERGIES  Allergies[4]    SOCIAL HISTORY   reports that she has quit smoking. Her smoking use included cigarettes. She has never used smokeless tobacco. She reports that she does not drink alcohol and does not use drugs.    FAMILY HISTORY  Family History[5]    REVIEW OF SYSTEMS  Complete review of systems was performed and was negative except for those items stated in the History of Presenting Illness and Past Medical/Surgical History.    PHYSICAL EXAMINATION  GENERAL:  In no acute  distress. Well-developed and well nourished.   SKIN: No rashes or open wounds involving posterior torso, posterior pelvis, lower extremities.  NEUROLOGIC:   Strength: 5/5 bilaterally with hip flexion, knee extension, knee flexion, ankle dorsiflexion, ankle eversion, ankle inversion, ankle plantarflexion, and great toe extension.   Sensation: intact light touch sensation throughout both lower extremities.   Reflexes: intact and symmetric in bilateral lower extremities. Babinski downgoing bilaterally. No clonus.   Gait: able to heel walk, toe walk, and perform tandem gait.   MUSCULOSKELETAL:  Physical Exam  MUSCULOSKELETAL: Tenderness in the left lumbar region.  Patient with restricted range of motion in the lumbar spine, slight right lateral lean.  Positive facet loading on left side with tenderness palpation of left lumbar paraspinal musculature.    REVIEW OF PRIOR X-RAYS/STUDIES  X-ray of the lumbar spine dated 4/15/2025 which reveals moderate multilevel degenerative changes most notably in the lower part of the lumbar spine.    IMPRESSION/DIAGNOSIS  Encounter Diagnosis   Name Primary?    Lumbar spondylosis Yes       TREATMENT/PLAN  Assessment & Plan  Back pain  Acute left-sided back pain exacerbated by movement. CT unremarkable. Current medications include tramadol, acetaminophen, and a muscle relaxant. Considering burst steroid pack and physical therapy to avoid surgery.  - Medrol  - Refer to physical therapy for back pain management.  - Consider further imaging or injections if no improvement.    Prediabetes  Previously diagnosed with diabetes, now prediabetes with A1c of 6.0. Good glycemic control achieved.  - Advise on dietary modifications to avoid carbohydrates, sugary drinks, breads, pastas, rice, wheat, cereal, flavored coffee drinks, and packaged foods.      Education was provided regarding the above impression/diagnosis and treatment options/plan were discussed.  All questions were answered during  today's visit.  Patient will contact clinic if any other questions or concerns.            Sd Amato DO  Interventional Spine and Sports Medicine Specialist   Physical Medicine and Rehabilitation  [facility]  [address] [place] [id number]    [address] [place] [address]                 [1]   Past Medical History:   Acute ischemic left MCA stroke (HCC)    Acute ischemic stroke (HCC)    Acute, but ill-defined, cerebrovascular disease    Arthritis    Arthritis of carpometacarpal (CMC) joint of right thumb    Cancer (HCC)    Uterine    Cancer determined by uterine cervix biopsy (HCC)    Carpal tunnel syndrome, unspecified laterality    Diabetes (HCC)    Essential hypertension    High blood pressure    High cholesterol    History of total knee arthroplasty, left    Hyperlipidemia    Incontinence    bladder    Obesity    Osteoarthritis    Sleep apnea    no CPAP use    Stroke (HCC)    Visual impairment    readers   [2]   Past Surgical History:  Procedure Laterality Date    Brain surgery  2012    fluid leaking from brain     Carpal tunnel release Bilateral 2000    Colonoscopy      Colonoscopy N/A 06/10/2019    Procedure: COLONOSCOPY;  Surgeon: David Kelly MD;  Location: [facility] ENDOSCOPY    Colonoscopy      Colonoscopy N/A 09/27/2024    Procedure: COLONOSCOPY;  Surgeon: David Kelly MD;  Location: [facility] ENDOSCOPY    D & c      Hand surgery  2000    Carpal tunnel    Hysterectomy  2009    Knee replacement surgery  2019    Knee surgery Left 12/13/2019    left total knee arthroplasty    Shoulder arthroscopy  2010 and 2012    RCR   [3]   Current Outpatient Medications on File Prior to Visit   Medication Sig Dispense Refill    lisinopril 20 MG Oral Tab Take 1 tablet (20 mg total) by mouth 2 (two) times daily. 180 tablet 3    alendronate 70 MG Oral Tab Take 1 tablet (70 mg total) by mouth once a week. 12 tablet 3    metFORMIN 500 MG Oral  Tab Take 1 tablet (500 mg total) by mouth Before Dinner. 90 tablet 3    pravastatin 20 MG Oral Tab Take 1 tablet (20 mg total) by mouth nightly. 90 tablet 3    amLODIPine 10 MG Oral Tab Take 1 tablet (10 mg total) by mouth daily. 90 tablet 3    traMADol 50 MG Oral Tab Take 1 tablet (50 mg total) by mouth every 8 (eight) hours as needed for Pain. (Patient not taking: Reported on 4/10/2025) 12 tablet 0    cyclobenzaprine 5 MG Oral Tab Take 1 tablet (5 mg total) by mouth 2 (two) times daily as needed for Muscle spasms. (Patient not taking: Reported on 4/10/2025) 30 tablet 0    ciprofloxacin (CIPRO) 500 MG Oral Tab Take 1 tablet (500 mg total) by mouth 2 (two) times daily. (Patient not taking: Reported on 4/10/2025) 14 tablet 0    clopidogrel 75 MG Oral Tab Take 1 tablet (75 mg total) by mouth daily. 90 tablet 3    Ferrous Sulfate 325 (65 Fe) MG Oral Tab Take 1 tablet (325 mg total) by mouth daily. 90 tablet 0    Coenzyme Q10 (CO Q10) 100 MG Oral Cap Take 1 tablet by mouth daily.      ONETOUCH ULTRA In Vitro Strip USE ONCE DAILY 100 strip 3    aspirin 81 MG Oral Tab EC Take 1 tablet (81 mg total) by mouth daily.      ONETOUCH DELICA PLUS [id number] Does not apply Misc USE TO TEST BLOOD SUGAR EVERY  each 3    Blood Glucose Monitoring Suppl (ONETOUCH ULTRA 2) w/Device Does not apply Kit Test daily 1 kit 0    MAGNESIUM OR Take by mouth daily.        VITAMIN D OR Take 5,000 Units by mouth daily.        Cyanocobalamin (VITAMIN B 12) 100 MCG Oral Lozenge Take 1,000 mg by mouth daily.      Turmeric Curcumin 500 MG Oral Cap Take 1 tablet by mouth 2 (two) times daily.         No current facility-administered medications on file prior to visit.   [4] No Known Allergies  [5]   Family History  Problem Relation Age of Onset    Uterine Cancer Self     Other (Other) Mother         SLE    Diabetes Father     Heart Disorder Father         CHF, Cardiac arrest    Pancreatic Cancer Brother     Cancer Brother 65        Pancreatic

## 2025-05-30 NOTE — PROGRESS NOTES
The following individual(s) verbally consented to be recorded using ambient AI listening technology and understand that they can each withdraw their consent to this listening technology at any point by asking the clinician to turn off or pause the recording:    Patient name: Ivonne Israel  Additional names:

## 2025-06-10 ENCOUNTER — OFFICE VISIT (OUTPATIENT)
Dept: PODIATRY CLINIC | Facility: CLINIC | Age: 75
End: 2025-06-10
Payer: MEDICARE

## 2025-06-10 DIAGNOSIS — E11.8 TYPE 2 DIABETES MELLITUS WITH COMPLICATION, WITHOUT LONG-TERM CURRENT USE OF INSULIN (HCC): Primary | ICD-10-CM

## 2025-06-10 PROCEDURE — 1159F MED LIST DOCD IN RCRD: CPT | Performed by: STUDENT IN AN ORGANIZED HEALTH CARE EDUCATION/TRAINING PROGRAM

## 2025-06-10 PROCEDURE — 99213 OFFICE O/P EST LOW 20 MIN: CPT | Performed by: STUDENT IN AN ORGANIZED HEALTH CARE EDUCATION/TRAINING PROGRAM

## 2025-06-10 NOTE — PROGRESS NOTES
[facility]  Progress Note      Ivonne Israel is a 75 year old female.   Chief Complaint   Patient presents with    Diabetic Foot Care     Pt in for Dm foot care, FBS this am 108, last A1c was on 1/24/25 at 6.0, denies any pain today with feet.             HPI:     Patient is a pleasant 75-year-old diabetic female presents to clinic for bilateral foot evaluation.  Denies any pedal injuries or trauma.  Denies any foot pain.    Allergies: Patient has no known allergies.    Current Medications[1]   Past Medical History[2]   Past Surgical History[3]   Family History[4]   Social Hx on file[5]        REVIEW OF SYSTEMS:     Denies nause, fever, chills  No calf pain  Denies chest pain or SOB      EXAM:   There were no vitals taken for this visit.  GENERAL: well developed, well nourished, in no apparent distress  EXTREMITIES:   1. Integument: Normal skin temperature and turgor  2. Vascular: Dorsalis pedis two out of four bilateral and posterior tibial pulses two out of   four bilateral, capillary refill normal.   3. Musculoskeletal: All muscle groups are graded 5 out of 5 in the foot and ankle.   4. Neurological: Normal sharp dull sensation; reflexes normal.      Bilateral barefoot skin diabetic exam is normal, visualized feet and the appearance is normal.  Bilateral monofilament/sensation of both feet is normal.  Pulsation pedal pulse exam of both lower legs/feet is normal as well.               ASSESSMENT AND PLAN:   Diagnoses and all orders for this visit:    Type 2 diabetes mellitus with complication, without long-term current use of insulin (HCC)  -     DME - External        Plan:       -Patient examined, chart history reviewed.  -Discussed importance of proper pedal hygiene, regular foot checks, and tight glucose control.  -Ambulate with supportive shoes and inserts and avoid walking barefoot.  -Educated patient on acute signs of infection advised patient to seek immediate medical attention if symptoms  arise.      The patient indicates understanding of these issues and agrees to the plan.        Angelina Boothe DPM        [1]   Current Outpatient Medications   Medication Sig Dispense Refill    methylPREDNISolone (MEDROL) 4 MG Oral Tablet Therapy Pack Take as directed 1 each 0    lisinopril 20 MG Oral Tab Take 1 tablet (20 mg total) by mouth 2 (two) times daily. 180 tablet 3    alendronate 70 MG Oral Tab Take 1 tablet (70 mg total) by mouth once a week. 12 tablet 3    metFORMIN 500 MG Oral Tab Take 1 tablet (500 mg total) by mouth Before Dinner. 90 tablet 3    pravastatin 20 MG Oral Tab Take 1 tablet (20 mg total) by mouth nightly. 90 tablet 3    amLODIPine 10 MG Oral Tab Take 1 tablet (10 mg total) by mouth daily. 90 tablet 3    clopidogrel 75 MG Oral Tab Take 1 tablet (75 mg total) by mouth daily. 90 tablet 3    Ferrous Sulfate 325 (65 Fe) MG Oral Tab Take 1 tablet (325 mg total) by mouth daily. 90 tablet 0    Coenzyme Q10 (CO Q10) 100 MG Oral Cap Take 1 tablet by mouth daily.      ONETOUCH ULTRA In Vitro Strip USE ONCE DAILY 100 strip 3    aspirin 81 MG Oral Tab EC Take 1 tablet (81 mg total) by mouth daily.      ONETOUCH DELICA PLUS [id number] Does not apply Misc USE TO TEST BLOOD SUGAR EVERY  each 3    Blood Glucose Monitoring Suppl (ONETOUCH ULTRA 2) w/Device Does not apply Kit Test daily 1 kit 0    MAGNESIUM OR Take by mouth daily.        VITAMIN D OR Take 5,000 Units by mouth daily.        Cyanocobalamin (VITAMIN B 12) 100 MCG Oral Lozenge Take 1,000 mg by mouth daily.      Turmeric Curcumin 500 MG Oral Cap Take 1 tablet by mouth 2 (two) times daily.       [2]   Past Medical History:   Acute ischemic left MCA stroke (HCC)    Acute ischemic stroke (HCC)    Acute, but ill-defined, cerebrovascular disease    Arthritis    Arthritis of carpometacarpal (CMC) joint of right thumb    Cancer (HCC)    Uterine    Cancer determined by uterine cervix biopsy (HCC)    Carpal tunnel syndrome, unspecified  laterality    Diabetes (HCC)    Essential hypertension    High blood pressure    High cholesterol    History of total knee arthroplasty, left    Hyperlipidemia    Incontinence    bladder    Obesity    Osteoarthritis    Sleep apnea    no CPAP use    Stroke (HCC)    Visual impairment    readers   [3]   Past Surgical History:  Procedure Laterality Date    Brain surgery  2012    fluid leaking from brain     Carpal tunnel release Bilateral 2000    Colonoscopy      Colonoscopy N/A 06/10/2019    Procedure: COLONOSCOPY;  Surgeon: David Kelly MD;  Location: [facility] ENDOSCOPY    Colonoscopy      Colonoscopy N/A 09/27/2024    Procedure: COLONOSCOPY;  Surgeon: David Kelly MD;  Location: [facility] ENDOSCOPY    D & c      Hand surgery  2000    Carpal tunnel    Hysterectomy  2009    Knee replacement surgery  2019    Knee surgery Left 12/13/2019    left total knee arthroplasty    Shoulder arthroscopy  2010 and 2012    RCR   [4]   Family History  Problem Relation Age of Onset    Uterine Cancer Self     Other (Other) Mother         SLE    Diabetes Father     Heart Disorder Father         CHF, Cardiac arrest    Pancreatic Cancer Brother     Cancer Brother 65        Pancreatic   [5]   Social History  Socioeconomic History    Marital status: Single   Tobacco Use    Smoking status: Former     Current packs/day: 0.00     Types: Cigarettes    Smokeless tobacco: Never   Vaping Use    Vaping status: Never Used   Substance and Sexual Activity    Alcohol use: No    Drug use: No   Other Topics Concern    Caffeine Concern Yes    Exercise No

## 2025-06-16 ENCOUNTER — OFFICE VISIT (OUTPATIENT)
Dept: PHYSICAL THERAPY | Facility: HOSPITAL | Age: 75
End: 2025-06-16
Attending: PHYSICAL MEDICINE & REHABILITATION
Payer: MEDICARE

## 2025-06-16 ENCOUNTER — TELEPHONE (OUTPATIENT)
Dept: PHYSICAL THERAPY | Facility: HOSPITAL | Age: 75
End: 2025-06-16

## 2025-06-16 DIAGNOSIS — M47.816 LUMBAR SPONDYLOSIS: Primary | ICD-10-CM

## 2025-06-16 PROCEDURE — 97162 PT EVAL MOD COMPLEX 30 MIN: CPT

## 2025-06-16 PROCEDURE — 97110 THERAPEUTIC EXERCISES: CPT

## 2025-06-16 RX ORDER — CLOPIDOGREL BISULFATE 75 MG/1
75 TABLET ORAL DAILY
Qty: 90 TABLET | Refills: 0 | Status: SHIPPED | OUTPATIENT
Start: 2025-06-16

## 2025-06-16 NOTE — PROGRESS NOTES
SPINE EVALUATION:     Diagnosis:   Lumbar spondylosis (M47.816) Patient:  Ivonne Israel (75 year old, female)        Referring Provider: Sd Amato  Today's Date   6/16/2025    Precautions:  None   Date of Evaluation: 06/16/25  Next MD visit: none scheduled.  Date of Surgery: No data recorded     PATIENT SUMMARY     Summary of chief complaints: L sided low back pain and flank pain  History of current condition: The patient reports that her back pain started in March with no mechanism of injury. She states that it was so bad that she went to the ER. She notes that she saw Dr. Amato and he presribed physical therapy and said that her issue was arthritis in the back. She had a flare up a few weeks ago when she was doing water aerobics and there was a long walk to the locker room and back to the car. When her back flares up she uses a walker or at home uses a rolling chair to get around. She has no stairs at home. She has not returned to water aerobics yet.    The patient reports that she has had a history of CVA in 2020, and has been using a cane since that time. She notes that she had no residual leg weakness from this stroke, but she did have weakness in her hands. She reports that she is not walking as smoothly as she was before. She did have therapy after her stroke.       Description of Symptoms:   P1: sharp back pain on L side in flank and then around to lower back, sometimes on R side  No lower extremity symptoms.   Aggravating Factors: getting out of bed, standing up from chair, prolonged sitting (20-30 minutes, this was in the past),   Relieving Factors: medication (steroid dose pack, Tramadol, Tylenol). Tkes tylwnol arthriti 1x/week. Heat pack during flare ups. No exercises.   24 hour pattern: no pattern.   Sleep: sleeps well, no issues with pain.       Pain level: current 0 /10, at best 5 /10, at worst 1 /10    Leisure activities/Hobbies:     Prior level of function: the patient had a prior  history of low back pain 2 years ago and thought it was related to a urinary infection, but was tole it was not and was prescribed medication, which helped. Pain did not return until March.  Current limitations:    Pt goals: get help with managing back pain    Past medical history was reviewed with Ivonne.  Significant findings include:    Imaging/Tests: lumbar spine x-ray: ALIGNMENT: Mild degenerative anterolisthesis L5 on S1 again noted VERTEBRAL BODIES:   No acute fracture or malalignment.  There is multilevel marginal osteophyte formation and facet arthropathy. DISC SPACES: Multilevel disc space narrowing most significant at L2-L3. SACROILIAC JOINTS: Grossly intact. OBLIQUE VIEWS: No pars defect. FLEXION/EXTENSION VIEWS: Normal range of motion.  No subluxation during flexion and extension.   OTHER: Negative.   Ivonne  has a past medical history of Acute ischemic left MCA stroke ([facility]) (01/14/2021), Acute ischemic stroke ([facility]) (01/14/2021), Acute, but ill-defined, cerebrovascular disease (12/2020), Arthritis, Arthritis of carpometacarpal (CMC) joint of right thumb (11/27/2018), Cancer ([facility]) (2009), Cancer determined by uterine cervix biopsy ([facility]) (2009), Carpal tunnel syndrome, unspecified laterality (Surgery 2000), Diabetes ([facility]), Essential hypertension, High blood pressure, High cholesterol, History of total knee arthroplasty, left (12/24/2019), Hyperlipidemia, Incontinence, Obesity, Osteoarthritis, Sleep apnea, Stroke ([facility]), and Visual impairment.  She  has a past surgical history that includes hysterectomy (2009); shoulder arthroscopy (2010 and 2012); Brain Surgery (2012); colonoscopy; colonoscopy (N/A, 06/10/2019); carpal tunnel release (Bilateral, 2000); colonoscopy; knee surgery (Left, 12/13/2019); colonoscopy (N/A, 09/27/2024); d & c; knee replacement surgery (2019); and Hand Surgery (2000).    ASSESSMENT  Ivonne presents to physical therapy evaluation with primary c/o L sided low back pain and flank pain.  The results of the objective tests and measures show impaired R hip range of motion particularly into extension, impaired lumbar spine range motion, impaired gait mechanics with antalgia on R LE. Functional deficits include but are not limited to  . Signs and symptoms are consistent with diagnosis of Lumbar spondylosis (M47.816). Pt and PT discussed evaluation findings, pathology, POC and HEP.  Pt voiced understanding and performs HEP correctly without reported pain. Skilled Physical Therapy is medically necessary to address the above impairments and reach functional goals.    OBJECTIVE:      Musculoskeletal:  Observation/Posture: stooped forward posture (the patient stands with R knee/hip in slight flexion with heel raised at rest)   Blood pressure: 118/72  Accessory Motion: deferred this date due to pain with bed mobility   Palpation: there is increased soft tissue tension present through lumbar paraspinals and QL on L, not painful     Special tests:         ROM and Strength:  (* denotes performed with pain)  Trunk ROM MMT (-/5)     Flex 50       Ext 20* (L sided back pain)      R L R L     Side bend WNL WNL*         Rotation WNL* WNL*       ,   Hip   ROM MMT (-/5)    R L R L     Flex (L2) 80* hip pain 90 4+ 4+     Ext  -35 (35 deg from neutral) 0         Abd 20* 40         ER 20* 35         IR 10* 20        ,   Knee   ROM MMT (-/5)    R L R L     Flex     5 5     Ext (L3)     5 5     ,   Ankle/Foot   ROM MMT (-/5)    R L R L     PF             DF (L4)     5 5     Inversion             Eversion             Grt Toe Ext (L5)     5 5         Neurological:  Sensation:       Deep Tendon Reflexes: grossly intact ALFREDITO UE/LE     UMN:      Singh's:       Clonus: negative     Babinski:       Peripheral Neurodynamic: WNL except     Balance and Functional Mobility:  Gait: pt ambulates on level ground with decreased stance phase (reduced hip extension on R in stance phase).  Balance: SLS: EO R  , EO L          Today's  Treatment and Response:   Pt education was provided on exam findings, treatment diagnosis, treatment plan, expectations, and prognosis.    Today's Treatment       6/16/2025   Spine Treatment   Therapeutic Exercise Heel slides on R 10x  Hooklying marching 10x ea  Seated trunk flexion 5x   Manual Therapy PROM R hip into flexion, ER, IR   Therapeutic Exercise Minutes 10   Manual Therapy Minutes 5   Evaluation Minutes 29   Total Time Of Timed Procedures 15   Total Time Of Service-Based Procedures 29   Total Treatment Time 44   HEP Access Code: [email or web address]  URL: [email or web address]  Date: 06/16/2025  Prepared by: Nan Baldwin    Exercises  - Supine March  - 1 x daily - 7 x weekly - 2 sets - 10 reps  - Supine Heel Slide  - 1 x daily - 7 x weekly - 2 sets - 10 reps  - Seated Forward Bending with PLB  - 1 x daily - 7 x weekly - 2 sets - 15 reps        Patient was instructed in and issued a HEP for: Access Code: [email or web address]  URL: [email or web address]  Date: 06/16/2025  Prepared by: Nan Baldwin    Exercises  - Supine March  - 1 x daily - 7 x weekly - 2 sets - 10 reps  - Supine Heel Slide  - 1 x daily - 7 x weekly - 2 sets - 10 reps  - Seated Forward Bending with PLB  - 1 x daily - 7 x weekly - 2 sets - 15 reps    Charges:  PT EVAL: Moderate Complexity, ther ex x1  In agreement with evaluation findings and clinical rationale, this evaluation involved MODERATE COMPLEXITY decision making due to 1-2 personal factors/comorbidities, 3 or more body structures involved/activity limitations, and evolving symptoms as documented in the evaluation.                                                                         PLAN OF CARE:      Goals: (to be met in 10 visits)   The patient will demonstrate R hip extension that is at least 10 deg from neutral or to neutral  The patient will demonstrate the ability to perform equal weight bearing bilaterally in standing  The patient will report being able to  return to water aerobics with no increase in back pain  The patient will demonstrate the ability to roll over on the mat table without increase in back pain  The patient will report little difficulty with getting in and out of a chair  The patient will report being able to walk > 1/4 mile on Oswestry  The patient will be independent and adherent in a comprehensive HEP     Frequency / Duration: Patient will be seen  x/week or a total of 10  visits over a 90 day period. Treatment will include: Gait training; Manual Therapy; Neuromuscular Re-education; Self-Care Home Management; Therapeutic Activities; Therapeutic Exercise; Patient/Family Education; Home Exercise Program instruction    Education or treatment limitation: None   Rehab Potential: good     Oswestry Disability Index Score  Score: (Patient-Rptd) 36 % (6/16/2025 12:32 PM)      Patient/Family/Caregiver was advised of these findings, precautions, and treatment options and has agreed to actively participate in planning and for this course of care.    Thank you for your referral. Please co-sign or sign and return this letter via fax as soon as possible to [phone]. If you have any questions, please contact me at Dept: [phone]    Sincerely,  Electronically signed by therapist: Nan Baldwin PT  Physician's certification required: Yes  I certify the need for these services furnished under this plan of treatment and while under my care.    X___________________________________________________ Date____________________    Certification From: 6/16/2025  To: 9/14/2025

## 2025-06-16 NOTE — TELEPHONE ENCOUNTER
Please review.  Protocol failed/has no protocol.    Last Office Visit: 03/11/2025  Please advise if refill is appropriate.

## 2025-06-26 ENCOUNTER — OFFICE VISIT (OUTPATIENT)
Dept: PHYSICAL THERAPY | Facility: HOSPITAL | Age: 75
End: 2025-06-26
Attending: PHYSICAL MEDICINE & REHABILITATION
Payer: MEDICARE

## 2025-06-26 PROCEDURE — 97140 MANUAL THERAPY 1/> REGIONS: CPT

## 2025-06-26 PROCEDURE — 97110 THERAPEUTIC EXERCISES: CPT

## 2025-06-26 NOTE — PROGRESS NOTES
Patient: Ivonne Israel (75 year old, female) Referring Provider:  Insurance:   Diagnosis: Lumbar spondylosis (M47.816) [facility]   Date of Surgery: No data recorded Next MD visit:  N/A   Precautions:  None none scheduled. Referral Information:    Date of Evaluation: Req: 0, Auth: 0, Exp:     06/16/25 POC Auth Visits:          Today's Date   6/26/2025    Subjective  The patient states that she is feeling ok, she was having some pain with the heel slide exercise. The pain got better after she stopped. She felt ok after the testing.       Pain: 0/10 (1/10 with lumbar extension)     Objective  R hip extension: 20 deg from neutral            Assessment  The patient continues to demonstrate considerable limitations in hip range of motion with default standing posture to R hip/knee flexed and ankle in plantarflexion. Encouraged her to watch her standing posture and perform corrections when able, to work towards improving hip/knee range of motion. Added a standing hip extension stretch to improve hip mobility and decrease effect on lumbar spine. No changes to HEP today.    Goals (to be met in 10 visits)   The patient will demonstrate R hip extension that is at least 10 deg from neutral or to neutral  The patient will demonstrate the ability to perform equal weight bearing bilaterally in standing  The patient will report being able to return to water aerobics with no increase in back pain  The patient will demonstrate the ability to roll over on the mat table without increase in back pain  The patient will report little difficulty with getting in and out of a chair  The patient will report being able to walk > 1/4 mile on Oswestry  The patient will be independent and adherent in a comprehensive HEP       Plan  continue hip stretching, try sidelying hip abduction    Treatment Last 4 Visits  Treatment Day: 2       6/16/2025 6/26/2025   Spine Treatment   Therapeutic Exercise Heel slides on R 10x  Hooklying  marching 10x ea  Seated trunk flexion 5x Heel slides 10x  PPT hooklying 2x10  Hooklying clam 1 at a time 2x10 ea red TB  Hip extension stretching on 6\" box 2x10 ea  FSU 6\" box 2x10 ea   Manual Therapy PROM R hip into flexion, ER, IR R hip PROM into flexion, ER, IR  Hooklying hip distraction grade III+, multiple bouts  Knee extension PROM multiple bouts   Therapeutic Exercise Minutes 10 32   Manual Therapy Minutes 5 12   Evaluation Minutes 29    Total Time Of Timed Procedures 15 44   Total Time Of Service-Based Procedures 29 0   Total Treatment Time 44 44   HEP Access Code: [email or web address]  URL: [email or web address]  Date: 06/16/2025  Prepared by: Nan Baldwin    Exercises  - Supine March  - 1 x daily - 7 x weekly - 2 sets - 10 reps  - Supine Heel Slide  - 1 x daily - 7 x weekly - 2 sets - 10 reps  - Seated Forward Bending with PLB  - 1 x daily - 7 x weekly - 2 sets - 15 reps         HEP  Access Code: [email or web address]  URL: [email or web address]  Date: 06/16/2025  Prepared by: Nan Baldwin    Exercises  - Supine March  - 1 x daily - 7 x weekly - 2 sets - 10 reps  - Supine Heel Slide  - 1 x daily - 7 x weekly - 2 sets - 10 reps  - Seated Forward Bending with PLB  - 1 x daily - 7 x weekly - 2 sets - 15 reps    Charges  man ther x1, ther ex x2

## 2025-07-02 ENCOUNTER — OFFICE VISIT (OUTPATIENT)
Dept: PHYSICAL THERAPY | Facility: HOSPITAL | Age: 75
End: 2025-07-02
Attending: PHYSICAL MEDICINE & REHABILITATION
Payer: MEDICARE

## 2025-07-02 PROCEDURE — 97140 MANUAL THERAPY 1/> REGIONS: CPT

## 2025-07-02 PROCEDURE — 97110 THERAPEUTIC EXERCISES: CPT

## 2025-07-02 NOTE — PROGRESS NOTES
Patient: Ivonne Israel (75 year old, female) Referring Provider:  Insurance:   Diagnosis: Lumbar spondylosis (M47.816) [facility]   Date of Surgery: No data recorded Next MD visit:  N/A   Precautions:  None none scheduled. Referral Information:    Date of Evaluation: Req: 0, Auth: 0, Exp:     06/16/25 POC Auth Visits:          Today's Date   7/2/2025    Subjective  The patient states that she is doing better. She didn't realize the hip was tight, and it is loosening up. After the last session the leg and knee was a little sore for that evening.       Pain: 0/10 (1/10 with lumbar extension)     Objective          Gait: gait is antalgic on R LE, pt has improved positioning of R LE in standing with improved ability to keep foot flat and reduced hip/knee flexion     Assessment  The patient had improvements in her hip range of motion today with passive hip stretching. She had noted improvements in her back pain with hip exercises performed thus far. The patient had increaed knee pain with lateral step ups, requiring modification to a different exercise. She requires continued skilled PT to improve hip strength and mobility.    Goals (to be met in 10 visits)   The patient will demonstrate R hip extension that is at least 10 deg from neutral or to neutral  The patient will demonstrate the ability to perform equal weight bearing bilaterally in standing  The patient will report being able to return to water aerobics with no increase in back pain  The patient will demonstrate the ability to roll over on the mat table without increase in back pain  The patient will report little difficulty with getting in and out of a chair  The patient will report being able to walk > 1/4 mile on Oswestry  The patient will be independent and adherent in a comprehensive HEP         Plan  lateral stepping    Treatment Last 4 Visits  Treatment Day: 3       6/16/2025 6/26/2025 7/2/2025   Spine Treatment   Therapeutic Exercise Heel  slides on R 10x  Hooklying marching 10x ea  Seated trunk flexion 5x Heel slides 10x  PPT hooklying 2x10  Hooklying clam 1 at a time 2x10 ea red TB  Hip extension stretching on 6\" box 2x10 ea  FSU 6\" box 2x10 ea SAQ 2x10  Heel slides for hip extension 10x  Hooklying clamshell red TB 2x10 ea, 1 at a time  Sit<>stand elevated mat table 2x5  Tried lateral step up on 4\" box, DC due to R knee pain  Hip extension stretching on 6\" box 5x ea  Sliders lateral for hip abduction 2x10 ea   Manual Therapy PROM R hip into flexion, ER, IR R hip PROM into flexion, ER, IR  Hooklying hip distraction grade III+, multiple bouts  Knee extension PROM multiple bouts PROM R hip into flexion, ER, IR  Knee extension PROM on R  Femoral AP glides with foam roller under knee grade III     Therapeutic Exercise Minutes 10 32 32   Manual Therapy Minutes 5 12 11   Evaluation Minutes 29     Total Time Of Timed Procedures 15 44 43   Total Time Of Service-Based Procedures 29 0 0   Total Treatment Time 44 44 43   HEP Access Code: [email or web address]  URL: [email or web address]  Date: 06/16/2025  Prepared by: Nan Baldwin    Exercises  - Supine March  - 1 x daily - 7 x weekly - 2 sets - 10 reps  - Supine Heel Slide  - 1 x daily - 7 x weekly - 2 sets - 10 reps  - Seated Forward Bending with PLB  - 1 x daily - 7 x weekly - 2 sets - 15 reps  Access Code: [email or web address]  URL: [email or web address]  Date: 07/02/2025  Prepared by: Nan Baldwin    Exercises  - Supine March  - 1 x daily - 7 x weekly - 2 sets - 10 reps  - Supine Heel Slide  - 1 x daily - 7 x weekly - 2 sets - 10 reps  - Seated Forward Bending with PLB  - 1 x daily - 7 x weekly - 2 sets - 15 reps  - Standing Hip Abduction with Counter Support  - 1 x daily - 7 x weekly - 2 sets - 10 reps  - Sit to Stand  - 1 x daily - 7 x weekly - 2 sets - 5 reps        HEP  Access Code: [email or web address]  URL: [email or web address]  Date: 07/02/2025  Prepared by: Nan  Baldwin    Exercises  - Supine March  - 1 x daily - 7 x weekly - 2 sets - 10 reps  - Supine Heel Slide  - 1 x daily - 7 x weekly - 2 sets - 10 reps  - Seated Forward Bending with PLB  - 1 x daily - 7 x weekly - 2 sets - 15 reps  - Standing Hip Abduction with Counter Support  - 1 x daily - 7 x weekly - 2 sets - 10 reps  - Sit to Stand  - 1 x daily - 7 x weekly - 2 sets - 5 reps    Charges  man ther x1, ther ex x2

## 2025-07-09 ENCOUNTER — OFFICE VISIT (OUTPATIENT)
Dept: PHYSICAL THERAPY | Facility: HOSPITAL | Age: 75
End: 2025-07-09
Attending: PHYSICAL MEDICINE & REHABILITATION
Payer: MEDICARE

## 2025-07-09 PROCEDURE — 97110 THERAPEUTIC EXERCISES: CPT

## 2025-07-09 PROCEDURE — 97140 MANUAL THERAPY 1/> REGIONS: CPT

## 2025-07-09 NOTE — PROGRESS NOTES
Patient: Ivonne Israel (75 year old, female) Referring Provider:  Insurance:   Diagnosis: Lumbar spondylosis (M47.816) [facility]d [facility]   Date of Surgery: No data recorded Next MD visit:  N/A   Precautions:  None none scheduled. Referral Information:    Date of Evaluation: Req: 0, Auth: 0, Exp:     06/16/25 POC Auth Visits:          Today's Date   7/9/2025    Subjective  The patient reports that her leg has been hurting her in the back of the thigh, it started Saturday. It is feeling a little better today. Tylenol helps. She states that she didn't do anything different. The back has been feeling ok. The posterior thigh pain occurs with walking, like walking in from the car.       Pain: 2/10 (walking in from the car)     Objective  hip extension: 30 from neutral            Assessment  The patient continues to demonstrate impairments in hip range of motion mostly into extension, however gait mechanics are improved since the initial session. She has good tolerance for passive stretching with no increase in pain. The patient was able to perform lateral stepping without increased hip pain today as well. Will plan to continue to work on improving strength and range of motion.    Goals (to be met in 10 visits)   The patient will demonstrate R hip extension that is at least 10 deg from neutral or to neutral  The patient will demonstrate the ability to perform equal weight bearing bilaterally in standing  The patient will report being able to return to water aerobics with no increase in back pain  The patient will demonstrate the ability to roll over on the mat table without increase in back pain  The patient will report little difficulty with getting in and out of a chair  The patient will report being able to walk > 1/4 mile on Oswestry  The patient will be independent and adherent in a comprehensive HEP           Plan       Treatment Last 4 Visits  Treatment Day: 4       6/16/2025 6/26/2025 7/2/2025 7/9/2025    Spine Treatment   Therapeutic Exercise Heel slides on R 10x  Hooklying marching 10x ea  Seated trunk flexion 5x Heel slides 10x  PPT hooklying 2x10  Hooklying clam 1 at a time 2x10 ea red TB  Hip extension stretching on 6\" box 2x10 ea  FSU 6\" box 2x10 ea SAQ 2x10  Heel slides for hip extension 10x  Hooklying clamshell red TB 2x10 ea, 1 at a time  Sit<>stand elevated mat table 2x5  Tried lateral step up on 4\" box, DC due to R knee pain  Hip extension stretching on 6\" box 5x ea  Sliders lateral for hip abduction 2x10 ea Heel slides 10x  SAQ 2x10  Shuttle DLP 31# 2x10  Lateral stepping in // bars 3x longterm, down and back  Hip extension stretching on 6\" box 5x ea  Calf stretching slant board 3x30 sec  LAQ 2x10   Manual Therapy PROM R hip into flexion, ER, IR R hip PROM into flexion, ER, IR  Hooklying hip distraction grade III+, multiple bouts  Knee extension PROM multiple bouts PROM R hip into flexion, ER, IR  Knee extension PROM on R  Femoral AP glides with foam roller under knee grade III   PROM R hip into flexion, ER, IR, extension in supine  Hooklying inferior glides grade III+  Passive knee/hip extension   Therapeutic Exercise Minutes 10 32 32 28   Manual Therapy Minutes 5 12 11 14   Evaluation Minutes 29      Total Time Of Timed Procedures 15 44 43 42   Total Time Of Service-Based Procedures 29 0 0 0   Total Treatment Time 44 44 43 42   HEP Access Code: [email or web address]  URL: [email or web address]  Date: 06/16/2025  Prepared by: Nan Baldwin    Exercises  - Supine March  - 1 x daily - 7 x weekly - 2 sets - 10 reps  - Supine Heel Slide  - 1 x daily - 7 x weekly - 2 sets - 10 reps  - Seated Forward Bending with PLB  - 1 x daily - 7 x weekly - 2 sets - 15 reps  Access Code: [email or web address]  URL: [email or web address]  Date: 07/02/2025  Prepared by: Nan Baldwin    Exercises  - Supine March  - 1 x daily - 7 x weekly - 2 sets - 10 reps  - Supine Heel Slide  - 1 x daily - 7 x weekly - 2 sets - 10  reps  - Seated Forward Bending with PLB  - 1 x daily - 7 x weekly - 2 sets - 15 reps  - Standing Hip Abduction with Counter Support  - 1 x daily - 7 x weekly - 2 sets - 10 reps  - Sit to Stand  - 1 x daily - 7 x weekly - 2 sets - 5 reps         HEP  Access Code: [email or web address]  URL: [email or web address]  Date: 07/02/2025  Prepared by: Nan Baldwin    Exercises  - Supine March  - 1 x daily - 7 x weekly - 2 sets - 10 reps  - Supine Heel Slide  - 1 x daily - 7 x weekly - 2 sets - 10 reps  - Seated Forward Bending with PLB  - 1 x daily - 7 x weekly - 2 sets - 15 reps  - Standing Hip Abduction with Counter Support  - 1 x daily - 7 x weekly - 2 sets - 10 reps  - Sit to Stand  - 1 x daily - 7 x weekly - 2 sets - 5 reps    Charges  man ther x1, ther ex x2

## 2025-07-16 ENCOUNTER — OFFICE VISIT (OUTPATIENT)
Dept: PHYSICAL THERAPY | Facility: HOSPITAL | Age: 75
End: 2025-07-16
Attending: PHYSICAL MEDICINE & REHABILITATION
Payer: MEDICARE

## 2025-07-16 PROCEDURE — 97110 THERAPEUTIC EXERCISES: CPT

## 2025-07-16 PROCEDURE — 97140 MANUAL THERAPY 1/> REGIONS: CPT

## 2025-07-17 NOTE — PROGRESS NOTES
Patient: Ivonne Israel (75 year old, female) Referring Provider:  Insurance:   Diagnosis: Lumbar spondylosis (M47.816) [facility]   Date of Surgery: No data recorded Next MD visit:  N/A   Precautions:  None none scheduled. Referral Information:    Date of Evaluation: Req: 0, Auth: 0, Exp:     06/16/25 POC Auth Visits:          Today's Date   7/16/2025    Subjective  The patient states that she was at a picnic on Friday and her back pain was triggered with sitting on a bench with no back support, but it was fine when she got home. Her knee is no longer hurting.       Pain: 0/10     Objective          Hip extension 25 from neutral     Assessment  Continuing with stretching and mobilization of R hip and knee to improve range of motion and improve gait mechanics. The patient was significantly limited in attempt to stretch into hip extension and had pain with this, therefore it was discontinued. She was able to perform the sidelying hip abduction exercise without increased pain. The patient requires continued skilled PT to improve functional strength. Noted increased difficulty with step ups on R LE.    Goals (to be met in 10 visits)   The patient will demonstrate R hip extension that is at least 10 deg from neutral or to neutral  The patient will demonstrate the ability to perform equal weight bearing bilaterally in standing  The patient will report being able to return to water aerobics with no increase in back pain  The patient will demonstrate the ability to roll over on the mat table without increase in back pain  The patient will report little difficulty with getting in and out of a chair  The patient will report being able to walk > 1/4 mile on Oswestry  The patient will be independent and adherent in a comprehensive HEP             Plan  continue step ups    Treatment Last 4 Visits  Treatment Day: 5 6/26/2025 7/2/2025 7/9/2025 7/16/2025   Spine Treatment   Therapeutic Exercise Heel slides  10x  PPT hooklying 2x10  Hooklying clam 1 at a time 2x10 ea red TB  Hip extension stretching on 6\" box 2x10 ea  FSU 6\" box 2x10 ea SAQ 2x10  Heel slides for hip extension 10x  Hooklying clamshell red TB 2x10 ea, 1 at a time  Sit<>stand elevated mat table 2x5  Tried lateral step up on 4\" box, DC due to R knee pain  Hip extension stretching on 6\" box 5x ea  Sliders lateral for hip abduction 2x10 ea Heel slides 10x  SAQ 2x10  Shuttle DLP 31# 2x10  Lateral stepping in // bars 3x care home, down and back  Hip extension stretching on 6\" box 5x ea  Calf stretching slant board 3x30 sec  LAQ 2x10 SAQ 2x10  Sidelying hip abduction 3x5  Lateral stepping 3x down/back half of // bars  FSU 6\" box 2x10 ea- modified to 4\" box for 2nd set  Calf stretching slant board 3x30 sec  LSU 4\" box 10x ea- DC R LE due difficulty on R   Manual Therapy R hip PROM into flexion, ER, IR  Hooklying hip distraction grade III+, multiple bouts  Knee extension PROM multiple bouts PROM R hip into flexion, ER, IR  Knee extension PROM on R  Femoral AP glides with foam roller under knee grade III   PROM R hip into flexion, ER, IR, extension in supine  Hooklying inferior glides grade III+  Passive knee/hip extension PROM R hip into flexion, ER, IR  Passive knee extension stretching over FR  Passive hip extension stretching- DC due to pain     Therapeutic Exercise Minutes 32 32 28 35   Manual Therapy Minutes 12 11 14 9   Total Time Of Timed Procedures 44 43 42 44   Total Time Of Service-Based Procedures 0 0 0 0   Total Treatment Time 44 43 42 44   HEP  Access Code: [email or web address]  URL: [email or web address]  Date: 07/02/2025  Prepared by: Nan Baldwin    Exercises  - Supine March  - 1 x daily - 7 x weekly - 2 sets - 10 reps  - Supine Heel Slide  - 1 x daily - 7 x weekly - 2 sets - 10 reps  - Seated Forward Bending with PLB  - 1 x daily - 7 x weekly - 2 sets - 15 reps  - Standing Hip Abduction with Counter Support  - 1 x daily - 7 x weekly - 2 sets -  10 reps  - Sit to Stand  - 1 x daily - 7 x weekly - 2 sets - 5 reps          HEP  Access Code: [email or web address]  URL: [email or web address]  Date: 07/02/2025  Prepared by: Nan Baldwin    Exercises  - Supine March  - 1 x daily - 7 x weekly - 2 sets - 10 reps  - Supine Heel Slide  - 1 x daily - 7 x weekly - 2 sets - 10 reps  - Seated Forward Bending with PLB  - 1 x daily - 7 x weekly - 2 sets - 15 reps  - Standing Hip Abduction with Counter Support  - 1 x daily - 7 x weekly - 2 sets - 10 reps  - Sit to Stand  - 1 x daily - 7 x weekly - 2 sets - 5 reps    Charges  man ther x1, ther ex x2

## 2025-07-23 ENCOUNTER — OFFICE VISIT (OUTPATIENT)
Dept: PHYSICAL THERAPY | Facility: HOSPITAL | Age: 75
End: 2025-07-23
Attending: PHYSICAL MEDICINE & REHABILITATION
Payer: MEDICARE

## 2025-07-23 PROCEDURE — 97110 THERAPEUTIC EXERCISES: CPT

## 2025-07-23 PROCEDURE — 97140 MANUAL THERAPY 1/> REGIONS: CPT

## 2025-07-23 NOTE — PROGRESS NOTES
Patient: Ivonne Israel (75 year old, female) Referring Provider:  Insurance:   Diagnosis: Lumbar spondylosis (M47.816) [facility]   Date of Surgery: No data recorded Next MD visit:  N/A   Precautions:  None none scheduled. Referral Information:    Date of Evaluation: Req: 0, Auth: 0, Exp:     06/16/25 POC Auth Visits:          Today's Date   7/23/2025    Subjective  The patient states that her back and leg are feeling good, she has not had any pain lately. She states that the strength is coming along.       Pain: 0/10     Objective  hip extension: R 30 deg from neutral            Assessment  The patient is noting reducing pain levels in the low back and R hip. R hip range of motion continues to demonstrate limitations into extension, however gait mechanics and standing posture have improved. Ivonne fatigues with strengthening exercises mostly on the right lower extremity, requiring occasional sitting breaks. She requires continued skilled PT to further improve functional strength.    Goals (to be met in 10 visits)   The patient will demonstrate R hip extension that is at least 10 deg from neutral or to neutral  The patient will demonstrate the ability to perform equal weight bearing bilaterally in standing  The patient will report being able to return to water aerobics with no increase in back pain  The patient will demonstrate the ability to roll over on the mat table without increase in back pain  The patient will report little difficulty with getting in and out of a chair  The patient will report being able to walk > 1/4 mile on Oswestry  The patient will be independent and adherent in a comprehensive HEP               Plan  leg press    Treatment Last 4 Visits  Treatment Day: 6       7/2/2025 7/9/2025 7/16/2025 7/23/2025   Spine Treatment   Therapeutic Exercise SAQ 2x10  Heel slides for hip extension 10x  Hooklying clamshell red TB 2x10 ea, 1 at a time  Sit<>stand elevated mat table 2x5  Tried  lateral step up on 4\" box, DC due to R knee pain  Hip extension stretching on 6\" box 5x ea  Sliders lateral for hip abduction 2x10 ea Heel slides 10x  SAQ 2x10  Shuttle DLP 31# 2x10  Lateral stepping in // bars 3x assisted, down and back  Hip extension stretching on 6\" box 5x ea  Calf stretching slant board 3x30 sec  LAQ 2x10 SAQ 2x10  Sidelying hip abduction 3x5  Lateral stepping 3x down/back half of // bars  FSU 6\" box 2x10 ea- modified to 4\" box for 2nd set  Calf stretching slant board 3x30 sec  LSU 4\" box 10x ea- DC R LE due difficulty on R Hooklying clamshell red TB 2x10 ea  Sidelying hip abduction 3x5  Seated knee extension yellow TB 2x10, no band 10x  Sit<>stand elevated table 2x5  FSU 4\" box 2x10 ea     Manual Therapy PROM R hip into flexion, ER, IR  Knee extension PROM on R  Femoral AP glides with foam roller under knee grade III   PROM R hip into flexion, ER, IR, extension in supine  Hooklying inferior glides grade III+  Passive knee/hip extension PROM R hip into flexion, ER, IR  Passive knee extension stretching over FR  Passive hip extension stretching- DC due to pain   PROM R hip into flexion, IR, IR, knee extension     Therapeutic Exercise Minutes 32 28 35 34   Manual Therapy Minutes 11 14 9 9   Total Time Of Timed Procedures 43 42 44 43   Total Time Of Service-Based Procedures 0 0 0 0   Total Treatment Time 43 42 44 43   HEP Access Code: [email or web address]  URL: [email or web address]  Date: 07/02/2025  Prepared by: Nan Baldwin    Exercises  - Supine March  - 1 x daily - 7 x weekly - 2 sets - 10 reps  - Supine Heel Slide  - 1 x daily - 7 x weekly - 2 sets - 10 reps  - Seated Forward Bending with PLB  - 1 x daily - 7 x weekly - 2 sets - 15 reps  - Standing Hip Abduction with Counter Support  - 1 x daily - 7 x weekly - 2 sets - 10 reps  - Sit to Stand  - 1 x daily - 7 x weekly - 2 sets - 5 reps   Access Code: [email or web address]  URL: [email or web address]  Date: 07/23/2025  Prepared by:  Nan Baldwin    Exercises  - Supine March  - 1 x daily - 7 x weekly - 2 sets - 10 reps  - Supine Heel Slide  - 1 x daily - 7 x weekly - 2 sets - 10 reps  - Seated Forward Bending with PLB  - 1 x daily - 7 x weekly - 2 sets - 15 reps  - Standing Hip Abduction with Counter Support  - 1 x daily - 7 x weekly - 2 sets - 10 reps  - Sit to Stand  - 1 x daily - 7 x weekly - 2 sets - 5 reps  - Seated Knee Extension with Resistance  - 1 x daily - 7 x weekly - 3 sets - 10 reps        HEP  Access Code: [email or web address]  URL: [email or web address]  Date: 07/23/2025  Prepared by: Nan Baldwin    Exercises  - Supine March  - 1 x daily - 7 x weekly - 2 sets - 10 reps  - Supine Heel Slide  - 1 x daily - 7 x weekly - 2 sets - 10 reps  - Seated Forward Bending with PLB  - 1 x daily - 7 x weekly - 2 sets - 15 reps  - Standing Hip Abduction with Counter Support  - 1 x daily - 7 x weekly - 2 sets - 10 reps  - Sit to Stand  - 1 x daily - 7 x weekly - 2 sets - 5 reps  - Seated Knee Extension with Resistance  - 1 x daily - 7 x weekly - 3 sets - 10 reps    Charges  man ther x1, ther ex x2

## 2025-07-30 ENCOUNTER — OFFICE VISIT (OUTPATIENT)
Dept: PHYSICAL THERAPY | Facility: HOSPITAL | Age: 75
End: 2025-07-30
Attending: PHYSICAL MEDICINE & REHABILITATION
Payer: MEDICARE

## 2025-07-30 PROCEDURE — 97110 THERAPEUTIC EXERCISES: CPT

## 2025-07-30 PROCEDURE — 97140 MANUAL THERAPY 1/> REGIONS: CPT

## 2025-08-06 ENCOUNTER — OFFICE VISIT (OUTPATIENT)
Dept: PHYSICAL THERAPY | Facility: HOSPITAL | Age: 75
End: 2025-08-06
Attending: PHYSICAL MEDICINE & REHABILITATION

## 2025-08-06 PROCEDURE — 97110 THERAPEUTIC EXERCISES: CPT

## 2025-08-06 PROCEDURE — 97140 MANUAL THERAPY 1/> REGIONS: CPT

## 2025-08-13 ENCOUNTER — OFFICE VISIT (OUTPATIENT)
Dept: PHYSICAL THERAPY | Facility: HOSPITAL | Age: 75
End: 2025-08-13
Attending: PHYSICAL MEDICINE & REHABILITATION

## 2025-08-13 PROCEDURE — 97140 MANUAL THERAPY 1/> REGIONS: CPT

## 2025-08-13 PROCEDURE — 97110 THERAPEUTIC EXERCISES: CPT

## 2025-08-20 ENCOUNTER — OFFICE VISIT (OUTPATIENT)
Dept: PHYSICAL THERAPY | Facility: HOSPITAL | Age: 75
End: 2025-08-20
Attending: PHYSICAL MEDICINE & REHABILITATION

## 2025-08-20 PROCEDURE — 97110 THERAPEUTIC EXERCISES: CPT

## 2025-08-20 PROCEDURE — 97140 MANUAL THERAPY 1/> REGIONS: CPT

## (undated) DEVICE — MEDI-VAC NON-CONDUCTIVE SUCTION TUBING 6MM X 1.8M (6FT.) L: Brand: CARDINAL HEALTH

## (undated) DEVICE — 3M™ STERI-STRIP™ COMPOUND BENZOIN TINCTURE 40 BAGS/CARTON 4 CARTONS/CASE C1544: Brand: 3M™ STERI-STRIP™

## (undated) DEVICE — TOTAL KNEE: Brand: MEDLINE INDUSTRIES, INC.

## (undated) DEVICE — SUTURE PDS II 1 CT-1

## (undated) DEVICE — 3M™ TEGADERM™ TRANSPARENT FILM DRESSING, 1626W, 4 IN X 4-3/4 IN (10 CM X 12 CM), 50 EACH/CARTON, 4 CARTON/CASE: Brand: 3M™ TEGADERM™

## (undated) DEVICE — BANDAGE ROLL,100% COTTON, 6 PLY, LARGE: Brand: KERLIX

## (undated) DEVICE — Device: Brand: DEFENDO AIR/WATER/SUCTION AND BIOPSY VALVE

## (undated) DEVICE — LINE MNTR ADLT SET O2 INTMD

## (undated) DEVICE — KIT DRN 3/16IN PVC DRN 3 SPRG

## (undated) DEVICE — SYRINGE, LUER SLIP, STERILE, 60ML: Brand: MEDLINE

## (undated) DEVICE — COVER SLV UNV DISP NTR STRL LF

## (undated) DEVICE — SOL  .9 1000ML BTL

## (undated) DEVICE — PROXIMATE SKIN STAPLERS (35 WIDE) CONTAINS 35 STAINLESS STEEL STAPLES (FIXED HEAD): Brand: PROXIMATE

## (undated) DEVICE — CEMENT MIXING SYSTEM WITH FEMORAL BREAKWAY NOZZLE: Brand: REVOLUTION

## (undated) DEVICE — CO2 CANNULA,SSOFT,ADLT,7O2,4CO2,FEMALE: Brand: MEDLINE

## (undated) DEVICE — KIT ENDO ORCAPOD 160/180/190

## (undated) DEVICE — 3M™ IOBAN™ 2 ANTIMICROBIAL INCISE DRAPE 6640EZ: Brand: IOBAN™ 2

## (undated) DEVICE — COTTON UNDERCAST PADDING,REGULAR FINISH: Brand: WEBRIL

## (undated) DEVICE — BANDAGE FLXMSTR 11YDX6IN STRL

## (undated) DEVICE — V2 SPECIMEN COLLECTION MANIFOLD KIT: Brand: NEPTUNE

## (undated) DEVICE — Device: Brand: PROPHECY

## (undated) DEVICE — BATTERY

## (undated) DEVICE — GAUZE SPONGES,12 PLY: Brand: CURITY

## (undated) DEVICE — SHEET,DRAPE,70X100,STERILE: Brand: MEDLINE

## (undated) DEVICE — SUTURE MONOCRYL 3-0 Y936H

## (undated) DEVICE — COTTON ROLL: Brand: DEROYAL

## (undated) DEVICE — GAMMEX® PI HYBRID SIZE 9, STERILE POWDER-FREE SURGICAL GLOVE, POLYISOPRENE AND NEOPRENE BLEND: Brand: GAMMEX

## (undated) DEVICE — SNARE CAPTIFLEX MICRO-OVL OLY

## (undated) DEVICE — ZIMMER® STERILE DISPOSABLE TOURNIQUET CUFF WITH PLC, DUAL PORT, SINGLE BLADDER, 42 IN. (107 CM)

## (undated) DEVICE — OCCLUSIVE GAUZE STRIP,3% BISMUTH TRIBROMOPHENATE IN PETROLATUM BLEND: Brand: XEROFORM

## (undated) DEVICE — POLAR CARE CUBE COOLING UNIT

## (undated) DEVICE — 3M™ STERI-DRAPE™ PATIENT ISOLATION DRAPE 1014: Brand: STERI-DRAPE™

## (undated) DEVICE — BLADE SAW SAGITTAL 19.5

## (undated) DEVICE — BLADE SW .5IN MCHC 2.75IN

## (undated) DEVICE — 450 ML BOTTLE OF 0.05% CHLORHEXIDINE GLUCONATE IN 99.95% STERILE WATER FOR IRRIGATION, USP AND APPLICATOR.: Brand: IRRISEPT ANTIMICROBIAL WOUND LAVAGE

## (undated) DEVICE — T5 HOOD WITH PEEL AWAY FACE SHIELD

## (undated) DEVICE — GIJAW SINGLE-USE BIOPSY FORCEPS WITH NEEDLE: Brand: GIJAW

## (undated) DEVICE — 3M™ STERI-DRAPE™ INCISE DRAPE 1050 (60CM X 45CM): Brand: STERI-DRAPE™

## (undated) DEVICE — TRAY SKIN PREP PVP-1

## (undated) DEVICE — SUTURE VICRYL 0 CP-1

## (undated) DEVICE — ESMARK: Brand: DEROYAL

## (undated) DEVICE — DRESSING BIOPATCH 1X4 CNTR

## (undated) DEVICE — TRAY FOLEY BDX 16F STATLOCK

## (undated) DEVICE — PAD THRP 16IN WRPON MU LNG STM

## (undated) DEVICE — TRAP 4 CPTR CHMBR N EZ INLN

## (undated) DEVICE — GAMMEX® NON-LATEX PI ORTHO SIZE 9, STERILE POLYISOPRENE POWDER-FREE SURGICAL GLOVE: Brand: GAMMEX

## (undated) DEVICE — Device: Brand: CUSTOM PROCEDURE KIT

## (undated) DEVICE — 48MM HEADED SCREW-Z

## (undated) DEVICE — TROCAR-Z

## (undated) DEVICE — WEBRIL COTTON UNDERCAST PADDING: Brand: WEBRIL

## (undated) DEVICE — SOLUTION SURG DURA PREP HAZMAT

## (undated) DEVICE — 3M™ STERI-STRIP™ REINFORCED ADHESIVE SKIN CLOSURES, R1547, 1/2 IN X 4 IN (12 MM X 100 MM), 6 STRIPS/ENVELOPE: Brand: 3M™ STERI-STRIP™

## (undated) DEVICE — LASSO POLYPECTOMY SNARE: Brand: LASSO

## (undated) DEVICE — KIT CLEAN ENDOKIT 1.1OZ GOWNX2

## (undated) NOTE — LETTER
[facility]  [address], [place], [place]    Authorization for Surgical Operation and Procedure                               I hereby authorize David Kelly MD, my physician and his/her assistants (if applicable), which may include medical students, residents, and/or fellows, to perform the following surgical operation/ procedure and administer such anesthesia as may be determined necessary by my physician: Operation/Procedure name (s) COLONOSCOPY on Ivonne Israel   2.   I recognize that during the surgical operation/procedure, unforeseen conditions may necessitate additional or different procedures than those listed above.  I, therefore, further authorize and request that the above-named surgeon, assistants, or designees perform such procedures as are, in their judgment, necessary and desirable.    3.   My surgeon/physician has discussed prior to my surgery the potential benefits, risks and side effects of this procedure; the likelihood of achieving goals; and potential problems that might occur during recuperation.  They also discussed reasonable alternatives to the procedure, including risks, benefits, and side effects related to the alternatives and risks related to not receiving this procedure.  I have had all my questions answered and I acknowledge that no guarantee has been made as to the result that may be obtained.    4.   Should the need arise during my operation/procedure, which includes change of level of care prior to discharge, I also consent to the administration of blood and/or blood products.  Further, I understand that despite careful testing and screening of blood or blood products by collecting agencies, I may still be subject to ill effects as a result of receiving a blood transfusion and/or blood products.  The following are some, but not all, of the potential risks that can occur: fever and allergic reactions, hemolytic reactions, transmission of diseases such as  Hepatitis, AIDS and Cytomegalovirus (CMV) and fluid overload.  In the event that I wish to have an autologous transfusion of my own blood, or a directed donor transfusion, I will discuss this with my physician.  Check only if Refusing Blood or Blood Products  I understand refusal of blood or blood products as deemed necessary by my physician may have serious consequences to my condition to include possible death. I hereby assume responsibility for my refusal and release the hospital, its personnel, and my physicians from any responsibility for the consequences of my refusal.    o  Refuse   5.   I authorize the use of any specimen, organs, tissues, body parts or foreign objects that may be removed from my body during the operation/procedure for diagnosis, research or teaching purposes and their subsequent disposal by hospital authorities.  I also authorize the release of specimen test results and/or written reports to my treating physician on the hospital medical staff or other referring or consulting physicians involved in my care, at the discretion of the Pathologist or my treating physician.    6.   I consent to the photographing or videotaping of the operations or procedures to be performed, including appropriate portions of my body for medical, scientific, or educational purposes, provided my identity is not revealed by the pictures or by descriptive texts accompanying them.  If the procedure has been photographed/videotaped, the surgeon will obtain the original picture, image, videotape or CD.  The hospital will not be responsible for storage, release or maintenance of the picture, image, tape or CD.    7.   I consent to the presence of a  or observers in the operating room as deemed necessary by my physician or their designees.    8.   I recognize that in the event my procedure results in extended X-Ray/fluoroscopy time, I may develop a skin reaction.    9. If I have a Do Not Attempt  Resuscitation (DNAR) order in place, that status will be suspended while in the operating room, procedural suite, and during the recovery period unless otherwise explicitly stated by me (or a person authorized to consent on my behalf). The surgeon or my attending physician will determine when the applicable recovery period ends for purposes of reinstating the DNAR order.  10. Patients having a sterilization procedure: I understand that if the procedure is successful the results will be permanent and it will therefore be impossible for me to inseminate, conceive, or bear children.  I also understand that the procedure is intended to result in sterility, although the result has not been guaranteed.   11. I acknowledge that my physician has explained sedation/analgesia administration to me including the risk and benefits I consent to the administration of sedation/analgesia as may be necessary or desirable in the judgment of my physician.    I CERTIFY THAT I HAVE READ AND FULLY UNDERSTAND THE ABOVE CONSENT TO OPERATION and/or OTHER PROCEDURE.     ____________________________________  _________________________________        ______________________________  Signature of Patient    Signature of Responsible Person                Printed Name of Responsible Person                                      ____________________________________  _____________________________                ________________________________  Signature of Witness        Date  Time         Relationship to Patient    STATEMENT OF PHYSICIAN My signature below affirms that prior to the time of the procedure; I have explained to the patient and/or his/her legal representative, the risks and benefits involved in the proposed treatment and any reasonable alternative to the proposed treatment. I have also explained the risks and benefits involved in refusal of the proposed treatment and alternatives to the proposed treatment and have answered the patient's  questions. If I have a significant financial interest in a co-management agreement or a significant financial interest in any product or implant, or other significant relationship used in this procedure/surgery, I have disclosed this and had a discussion with my patient.     _____________________________________________________              _____________________________  (Signature of Physician)                                                                                         (Date)                                   (Time)  Patient Name: Ivonne Israel      : 1950      Printed: 2024     Medical Record #: [id number]                                      Page 1 of 1

## (undated) NOTE — LETTER
[facility]  Administration of Anesthesia  Ivonne COLE agree to be cared for by a physician anesthesiologist alone and/or with a nurse anesthetist, who is specially trained to monitor me and give me medicine to put me to sleep or keep me comfortable during my procedure    I understand that my anesthesiologist and/or anesthetist is not an employee or agent of [facility] or [email or web address]. He or she works for [facility]    As the patient asking for anesthesia services, I agree to:  Allow the anesthesiologist (anesthesia doctor) to give me medicine and do additional procedures as necessary. Some examples are: Starting or using an “IV” to give me medicine, fluids or blood during my procedure, and having a breathing tube placed to help me breathe when I’m asleep (intubation). In the event that my heart stops working properly, I understand that my anesthesiologist will make every effort to sustain my life, unless otherwise directed by [facility] Do Not Resuscitate documents.  Tell my anesthesia doctor before my procedure:  If I am pregnant.  The last time that I ate or drank.  iii. All of the medicines I take (including prescriptions, herbal supplements, and pills I can buy without a prescription (including street drugs/illegal medications). Failure to inform my anesthesiologist about these medicines may increase my risk of anesthetic complications.  iv.If I am allergic to anything or have had a reaction to anesthesia before.  I understand how the anesthesia medicine will help me (benefits).  I understand that with any type of anesthesia medicine there are risks:  The most common risks are: nausea, vomiting, sore throat, muscle soreness, damage to my eyes, mouth, or teeth (from breathing tube placement).  Rare risks include: remembering what happened during my procedure, allergic reactions to medications, injury to my airway, heart, lungs, vision, nerves, or  muscles and in extremely rare instances death.  My doctor has explained to me other choices available to me for my care (alternatives).  Pregnant Patients (“epidural”):  I understand that the risks of having an epidural (medicine given into my back to help control pain during labor), include itching, low blood pressure, difficulty urinating, headache or slowing of the baby’s heart. Very rare risks include infection, bleeding, seizure, irregular heart rhythms and nerve injury.  Regional Anesthesia (“spinal”, “epidural”, & “nerve blocks”):  I understand that rare but potential complications include headache, bleeding, infection, seizure, irregular heart rhythms, and nerve injury.    _____________________________________________________________________________  Patient (or Representative) Signature/Relationship to Patient  Date   Time    _____________________________________________________________________________   Name (if used)    Language/Organization   Time    _____________________________________________________________________________  Nurse Anesthetist Signature     Date   Time  _____________________________________________________________________________  Anesthesiologist Signature     Date   Time  I have discussed the procedure and information above with the patient (or patient’s representative) and answered their questions. The patient or their representative has agreed to have anesthesia services.    _____________________________________________________________________________  Witness        Date   Time  I have verified that the signature is that of the patient or patient’s representative, and that it was signed before the procedure  Patient Name: Ivonne Israel     : 1950                 Printed: 2024 at 12:34 PM    Medical Record #: [id number]                                            Page 1 of 1  ----------ANESTHESIA CONSENT----------

## (undated) NOTE — IP AVS SNAPSHOT
[facility]            (For Outpatient Use Only) Initial Admit Date: 12/13/2019   Inpt/Obs Admit Date: Inpt: 12/13/19 / Obs: N/A   Discharge Date:    Nanci Solis:  [de-identified]   MRN: [de-identified]   CSN: [id number]   CEID: [id number]        E Hospital Account Financial Class: Medicare Advantage    December 16, 2019

## (undated) NOTE — IP AVS SNAPSHOT
Patient Demographics     Address  [place] [address] Phone  [phone] [facility])  [phone] (Mobile) *Preferred* E-mail Address  [email or web address]      Emergency Contact(s)     Name Relation Home Work Brunoandria Jhaveri Brother [phone] Ira Brooks MD         dilTIAZem HCl ER Coated Beads 240 MG Cp24  Commonly known as:  CARDIZEM CD  Next dose due: Tonight       Take 1 capsule (240 mg total) by mouth nightly. Ira Brooks MD         MAGNESIUM OR  Next dose due:   Tomorrow morning [id number] Potassium Chloride ER (K-DUR M20) CR tab 40 mEq 12/16/19 0958 Given      [id number] Potassium Chloride ER (K-DUR M20) CR tab 40 mEq 12/16/19 1344 Given      [id number] Pravastatin Sodium (PRAVACHOL) tab 20 mg 12/15/19 2007 Given      [id number] Glucose 132 70 - 99 mg/dL [facility]   Sodium 135 136 - 145 mmol/L  [facility]   Potassium 3.6 3.5 - 5.1 mmol/L — [facility]   Chloride 99 98 - 112 mmol/L — [facility]   CO2 31.0 21.0 - 32.0 mmol/L — [facility]   Anion Gap 5 0 - 18 mmol/L [employer] Filed:  12/13/2019  7:19 AM Date of Service:  12/13/2019  7:19 AM Status:  Signed    :  Anna Crouch MD (Physician)          Vincent Dudley MD   Physician   Internal Medicine   Progress Notes      Signed   Encounter Date:  12/3/2019 • MELOXICAM 15 MG Oral Tab TAKE ONE TABLET BY MOUTH ONE TIME DAILY with food (Patient taking differently: Take 15 mg by mouth daily as needed.  ) 35 tablet 2   • Cholecalciferol (VITAMIN D) 1000 UNITS Oral Tab Take 5 capsules by mouth daily.       • Cyanoc HEENT: denies nasal congestion, sinus pain or sore throat  LUNGS: denies shortness of breath with exertion  CARDIOVASCULAR: denies chest pain on exertion  GI: denies abdominal pain  : denies dysuria  MUSCULOSKELETAL: denies joint pain  NEURO: denies head  Low voltage with rightward P-axis and rotation -possible pulmonary disease                    3.  Heme  No history of bleeding disorder, no evidence of significant anemia  CBC reviewed      4. Renal  No hx of renal disease  CMP reviewed            Medical REVIEW OF SYSTEMS:   A twelve point review of systems was performed as documented on the intake form and reviewed by me today with pertinent positives and negatives listed in the HPI.   GENERAL HEALTH: denies weight loss of significance, denies fatigue, fev • Cholecalciferol (VITAMIN D) 1000 UNITS Oral Tab Take 5 capsules by mouth daily.       • Cyanocobalamin (VITAMIN B 12) 100 MCG Oral Lozenge Take 1,000 mg by mouth daily.       • Coenzyme Q10 (COQ10) 200 MG Oral Cap Take 1 tablet by mouth daily.       • Virgilio · No ecchymosis nor bruising.      · Good range of motion of the ipsilateral hip and ankle is seen.      · Homans' sign is negative.      · Good color and capillary refill.      · Distal pedal pulses are palpable.      · The patient walks with an antalgic activity, modification of activity, anti-inflammatory medication, repeat intraarticular corticosteroid injection, hyaluronic acid gel injection and surgical treatment options including arthroscopy and total knee arthroplasty.      With regards to the total despite anesthesia clearance; neurovascular injury including peroneal stretch injury (footdrop) requiring additional medical and/or surgical treatment (with increased incidence explained in patients with large deformity and correcting a valgus knee; increa Electronically signed by Linda Yin MD on 12/13/2019 11:55 AM   Attribution Matias    [address] [place]. 1 - Linda Yin MD on 12/13/2019 11:54 AM                     D/C Summary    No notes of this type exist for this encounter.         Physical Therapy Notes the bathroom. Pt with increased pain level and was mod A with her bed mobility and to transfer to the EOB. Pt required assist with her L LE and to scoot herself to the EOB. Pt was mod A with sit<>stand transfers with the RW.  Pt was able to AMB in the bathr -   Moving from lying on back to sitting on the side of the bed?: A Little[RM.2]   How much help from another person does the patient currently need. .. [RM.1]   -   Moving to and from a bed to a chair (including a wheelchair)?: A Little   -   Need to walk i Goal #3   Current Status 76' with the RW min A[RM.4] am  36' with the RW min A pm[RM. 3]   Goal #4 No stair goal at this time   Goal #4   Current Status    Goal #5  AROM 0 degrees extension to 95 degrees flexion     Goal #5   Current Status IN PROGRESS   Go in the bed. Pt also required the use of the over head trapeze during the transfer. Pt is left in the bed with all needs within reach. Pt is on track to possibly dc to rehab today once medically cleared.  Reported to the RN on the status of the pt.[RM.3] Little   -   Need to walk in hospital room?: A Little   -   Climbing 3-5 steps with a railing?: A Lot[RM. 2]     AM-PAC Score:[RM.1]  Raw Score: 16   Approx Degree of Impairment: 54.16%   Standardized Score (AM-PAC Scale): 40.78   CMS Modifier (G-Code): CK[ ROM/strengthening per the instructions provided in preparation for discharge. Goal #6  Current Status Educated and performed[RM. 3]          Attribution Key    RM. 1 - Jack Wasserman, PTA on 12/16/2019 12:39 PM  RM. 2 - Jack Wasserman, PTA on 12/16/20 PT Treatment Plan: Bed mobility; Body mechanics; Patient education;Gait training;Range of motion;Strengthening;Transfer training;Balance training    SUBJECTIVE  Pt was agreeable to therapy session.      OBJECTIVE  Precautions: Limb alert - left    WEIGHT BEAR Patient End of Session: Needs met;Call light within reach;RN aware of session/findings; All patient questions and concerns addressed; In bed;SCDs in place; Alarm set    CURRENT GOALS    Goals to be met by: 12/28/19  Patient Goal Patient's self-stated goal is: for L TKA. WBAT. Pt cleared for PT eval by RN, pt agreeable. Pt fearful with anticipating increased pain throughout session, pt rated pain 2/10 throughout.  Bed mobility min A, needed significantly increased time and verbal cues for task performance, and pt motion;Strengthening;Stoop training;Stair training;Transfer training;Balance training  Rehab Potential : Fair  Frequency (Obs): BID[RP.2]       PHYSICAL THERAPY MEDICAL/SOCIAL HISTORY     History related to current admission: Dr. Kadie Meza, pt reports she h techniques;Relaxation;Repositioning[RP.2]    COGNITION  · Overall Cognitive Status:  WFL - within functional limits    RANGE OF MOTION AND STRENGTH ASSESSMENT  Upper extremity ROM and strength are within functional limits     Lower extremity ROM is within Patient End of Session: Up in chair;Needs met;Call light within reach;RN aware of session/findings; All patient questions and concerns addressed; Ice applied[RP.2]    CURRENT GOALS    Goals to be met by: 12/28/19  Patient Goal Patient's self-stated goal is: Orders received, chart review complete. RN approved patient participation. Patient is a[ST.3 77year old[ST.2] female admitted 12/13/2019 for L TKA, wbat, pt seen POD#2. Pt received supine in bed, SCDs in place and agreeable to therapy evaluation.  Educate simplification techniques;ADL training;Functional transfer training; Endurance training;Patient/Family education;Patient/Family training;Equipment eval/education; Compensatory technique education[ST.2]       OCCUPATIONAL THERAPY MEDICAL/SOCIAL HISTORY     Pr WEIGHT BEARING RESTRICTION[ST.1]  Weight Bearing Restriction: L lower extremity  L Lower Extremity: Weight Bearing as Tolerated[ST.2]    PAIN ASSESSMENT[ST.1]  Ratin  Location: L knee  Management Techniques: Relaxation;Repositioning[ST. 2]    ACTIVITY T Patient self-stated goal is: does not state      Patient will complete LE dressing with SBA  Comment:     Patient will complete toilet transfer with SBA  Comment:     Patient will complete self care task at sink level with SBA   Comment:             Goals

## (undated) NOTE — MR AVS SNAPSHOT
[facility]  [address] [address] [phone]               Thank you for choosing us for your health care visit with Patrick Louie MD.  We are glad to serve you and happy to provide you with this summary of your Take 1 tablet (25 mg total) by mouth daily. Commonly known as:  HYGROTEN           CoQ10 200 MG Caps   Take 1 tablet by mouth daily. DilTIAZem HCl  MG Cp24   Take 1 capsule (240 mg total) by mouth daily.    Commonly known as:  DILACOR XR Test Strip Lot # [id number] Numeric    Test Strip Expiration Date 9/9/17 Date                  MyChart     Visit [employer]  You can access your MyChart to more actively manage your health care and view more details from this visit by going to [email or web address]